# Patient Record
Sex: FEMALE | Race: WHITE | NOT HISPANIC OR LATINO | Employment: OTHER | URBAN - METROPOLITAN AREA
[De-identification: names, ages, dates, MRNs, and addresses within clinical notes are randomized per-mention and may not be internally consistent; named-entity substitution may affect disease eponyms.]

---

## 2023-09-03 PROBLEM — R41.3 MEMORY LOSS: Status: ACTIVE | Noted: 2023-09-03

## 2023-09-03 PROBLEM — H26.9 CATARACT: Status: ACTIVE | Noted: 2023-09-03

## 2023-09-03 PROBLEM — H25.12 AGE-RELATED NUCLEAR CATARACT OF LEFT EYE: Status: ACTIVE | Noted: 2023-09-03

## 2023-09-03 PROBLEM — E03.9 HYPOTHYROID: Status: ACTIVE | Noted: 2023-09-03

## 2023-09-03 PROBLEM — M19.90 OSTEOARTHRITIS: Status: ACTIVE | Noted: 2023-09-03

## 2023-09-03 PROBLEM — Z98.41 CATARACT EXTRACTION STATUS OF RIGHT EYE: Status: ACTIVE | Noted: 2023-09-03

## 2023-09-03 PROBLEM — F41.9 ANXIETY: Status: ACTIVE | Noted: 2023-09-03

## 2023-09-03 PROBLEM — R32 URINARY INCONTINENCE: Status: ACTIVE | Noted: 2023-09-03

## 2023-09-03 PROBLEM — E55.9 VITAMIN D DEFICIENCY: Status: ACTIVE | Noted: 2023-09-03

## 2023-09-03 PROBLEM — Z97.3 WEARS EYEGLASSES: Status: ACTIVE | Noted: 2023-09-03

## 2023-09-03 PROBLEM — I82.90 VENOUS THROMBOSIS: Status: ACTIVE | Noted: 2023-09-03

## 2023-09-03 PROBLEM — R10.32 LEFT LOWER QUADRANT PAIN: Status: ACTIVE | Noted: 2023-09-03

## 2023-09-03 PROBLEM — K21.9 GASTROESOPHAGEAL REFLUX DISEASE: Status: ACTIVE | Noted: 2023-09-03

## 2023-09-03 PROBLEM — I25.10 CORONARY ARTERY DISEASE: Status: ACTIVE | Noted: 2023-09-03

## 2023-09-03 PROBLEM — H04.129 DRY EYE: Status: ACTIVE | Noted: 2023-09-03

## 2023-09-03 PROBLEM — E78.5 HYPERLIPIDEMIA: Status: ACTIVE | Noted: 2023-09-03

## 2023-09-03 PROBLEM — N95.9 MENOPAUSAL AND POSTMENOPAUSAL DISORDER: Status: ACTIVE | Noted: 2023-09-03

## 2023-09-03 PROBLEM — H43.811 POSTERIOR VITREOUS DETACHMENT OF RIGHT EYE: Status: ACTIVE | Noted: 2023-09-03

## 2023-09-03 PROBLEM — H43.399 VITREOUS FLOATERS: Status: ACTIVE | Noted: 2023-09-03

## 2023-09-03 PROBLEM — I10 HYPERTENSION: Status: ACTIVE | Noted: 2023-09-03

## 2023-09-03 PROBLEM — H81.10 BENIGN PAROXYSMAL POSITIONAL VERTIGO: Status: ACTIVE | Noted: 2023-09-03

## 2023-09-03 RX ORDER — HYDROCODONE BITARTRATE AND ACETAMINOPHEN 5; 325 MG/1; MG/1
1-2 TABLET ORAL EVERY 4 HOURS PRN
COMMUNITY
End: 2023-12-18 | Stop reason: WASHOUT

## 2023-09-03 RX ORDER — NEOMYCIN/POLYMYXIN B/PRAMOXINE 3.5-10K-1
1 CREAM (GRAM) TOPICAL DAILY
COMMUNITY
End: 2023-12-18 | Stop reason: WASHOUT

## 2023-09-03 RX ORDER — GABAPENTIN 100 MG/1
CAPSULE ORAL
COMMUNITY
End: 2024-06-10 | Stop reason: SDUPTHER

## 2023-09-03 RX ORDER — ASCORBIC ACID 500 MG
500 TABLET ORAL DAILY
COMMUNITY
End: 2023-12-18 | Stop reason: WASHOUT

## 2023-09-03 RX ORDER — ROSUVASTATIN CALCIUM 40 MG/1
TABLET, COATED ORAL
COMMUNITY
End: 2024-03-28 | Stop reason: SDUPTHER

## 2023-09-03 RX ORDER — DONEPEZIL HYDROCHLORIDE 5 MG/1
TABLET, FILM COATED ORAL
COMMUNITY
End: 2023-12-18 | Stop reason: WASHOUT

## 2023-09-03 RX ORDER — ASCORBIC ACID 500 MG
500 TABLET ORAL 2 TIMES DAILY
COMMUNITY
End: 2023-12-18 | Stop reason: WASHOUT

## 2023-09-03 RX ORDER — NAPROXEN SODIUM 220 MG/1
81 TABLET, FILM COATED ORAL DAILY
COMMUNITY

## 2023-09-03 RX ORDER — SPIRONOLACTONE 25 MG/1
TABLET ORAL
COMMUNITY
End: 2023-12-18

## 2023-09-03 RX ORDER — PANTOPRAZOLE SODIUM 40 MG/1
40 TABLET, DELAYED RELEASE ORAL DAILY
COMMUNITY
End: 2024-03-19

## 2023-09-03 RX ORDER — AMLODIPINE BESYLATE 10 MG/1
10 TABLET ORAL DAILY
COMMUNITY
End: 2024-03-11

## 2023-09-03 RX ORDER — ATENOLOL 100 MG/1
1 TABLET ORAL 2 TIMES DAILY
COMMUNITY
End: 2024-06-10 | Stop reason: SDUPTHER

## 2023-09-03 RX ORDER — CHOLECALCIFEROL (VITAMIN D3) 50 MCG
2000 TABLET ORAL DAILY
COMMUNITY

## 2023-09-03 RX ORDER — CHOLECALCIFEROL (VITAMIN D3) 25 MCG
25 TABLET ORAL DAILY
COMMUNITY
End: 2023-12-18 | Stop reason: WASHOUT

## 2023-09-03 RX ORDER — PRAVASTATIN SODIUM 80 MG/1
80 TABLET ORAL DAILY
COMMUNITY
End: 2023-12-18 | Stop reason: WASHOUT

## 2023-09-03 RX ORDER — DONEPEZIL HYDROCHLORIDE 10 MG/1
1 TABLET, FILM COATED ORAL NIGHTLY
COMMUNITY
End: 2024-06-10 | Stop reason: SDUPTHER

## 2023-09-03 RX ORDER — LANOLIN ALCOHOL/MO/W.PET/CERES
1 CREAM (GRAM) TOPICAL DAILY
COMMUNITY

## 2023-09-03 RX ORDER — ASPIRIN 325 MG
325 TABLET ORAL 2 TIMES DAILY
COMMUNITY
End: 2023-12-18 | Stop reason: WASHOUT

## 2023-09-03 RX ORDER — LEVOTHYROXINE SODIUM 25 UG/1
25 TABLET ORAL
COMMUNITY
End: 2024-06-10 | Stop reason: SDUPTHER

## 2023-09-03 RX ORDER — INFLUENZA A VIRUS A/VICTORIA/2570/2019 IVR-215 (H1N1) ANTIGEN (FORMALDEHYDE INACTIVATED), INFLUENZA A VIRUS A/DARWIN/9/2021 SAN-010 (H3N2) ANTIGEN (FORMALDEHYDE INACTIVATED), INFLUENZA B VIRUS B/PHUKET/3073/2013 ANTIGEN (FORMALDEHYDE INACTIVATED), AND INFLUENZA B VIRUS B/MICHIGAN/01/2021 ANTIGEN (FORMALDEHYDE INACTIVATED) 60; 60; 60; 60 UG/.7ML; UG/.7ML; UG/.7ML; UG/.7ML
INJECTION, SUSPENSION INTRAMUSCULAR
COMMUNITY
End: 2024-04-15 | Stop reason: WASHOUT

## 2023-10-09 ENCOUNTER — TELEPHONE (OUTPATIENT)
Dept: PRIMARY CARE | Facility: CLINIC | Age: 88
End: 2023-10-09
Payer: MEDICARE

## 2023-10-09 DIAGNOSIS — E78.2 MIXED HYPERLIPIDEMIA: Primary | ICD-10-CM

## 2023-10-09 DIAGNOSIS — R53.82 CHRONIC FATIGUE: ICD-10-CM

## 2023-10-09 DIAGNOSIS — R30.0 DYSURIA: ICD-10-CM

## 2023-10-09 DIAGNOSIS — E03.9 ACQUIRED HYPOTHYROIDISM: ICD-10-CM

## 2023-10-09 DIAGNOSIS — E53.8 VITAMIN B12 DEFICIENCY: ICD-10-CM

## 2023-10-09 DIAGNOSIS — E55.9 VITAMIN D DEFICIENCY: ICD-10-CM

## 2023-10-09 NOTE — TELEPHONE ENCOUNTER
I am concerned she may have a bladder infection - I will place lab orders fo her thyroid and other labs and a urine order - recommend she get them checked asap

## 2023-10-09 NOTE — TELEPHONE ENCOUNTER
Patients niece called asking about patient thyroid medication. She stated there was a change in the thyroid. She is is feeling dizzy and has no appetite she would like to know if that could be because of the change in her thyroid levels. She can be reached at [phone]

## 2023-10-10 ENCOUNTER — LAB (OUTPATIENT)
Dept: LAB | Facility: LAB | Age: 88
End: 2023-10-10
Payer: MEDICARE

## 2023-10-10 DIAGNOSIS — E53.8 VITAMIN B12 DEFICIENCY: ICD-10-CM

## 2023-10-10 DIAGNOSIS — E78.2 MIXED HYPERLIPIDEMIA: ICD-10-CM

## 2023-10-10 DIAGNOSIS — E55.9 VITAMIN D DEFICIENCY: ICD-10-CM

## 2023-10-10 DIAGNOSIS — R53.82 CHRONIC FATIGUE: ICD-10-CM

## 2023-10-10 DIAGNOSIS — R30.0 DYSURIA: ICD-10-CM

## 2023-10-10 DIAGNOSIS — E03.9 ACQUIRED HYPOTHYROIDISM: ICD-10-CM

## 2023-10-10 LAB
ALBUMIN SERPL BCP-MCNC: 4.5 G/DL (ref 3.4–5)
ALP SERPL-CCNC: 66 U/L (ref 33–136)
ALT SERPL W P-5'-P-CCNC: 20 U/L (ref 7–45)
AMORPH CRY #/AREA UR COMP ASSIST: ABNORMAL /HPF
ANION GAP SERPL CALC-SCNC: 12 MMOL/L (ref 10–20)
APPEARANCE UR: ABNORMAL
AST SERPL W P-5'-P-CCNC: 21 U/L (ref 9–39)
BACTERIA #/AREA URNS AUTO: ABNORMAL /HPF
BASOPHILS # BLD AUTO: 0.02 X10*3/UL (ref 0–0.1)
BASOPHILS NFR BLD AUTO: 0.4 %
BILIRUB SERPL-MCNC: 1.2 MG/DL (ref 0–1.2)
BILIRUB UR STRIP.AUTO-MCNC: NEGATIVE MG/DL
BUN SERPL-MCNC: 18 MG/DL (ref 6–23)
CALCIUM SERPL-MCNC: 10.3 MG/DL (ref 8.6–10.3)
CHLORIDE SERPL-SCNC: 97 MMOL/L (ref 98–107)
CHOLEST SERPL-MCNC: 125 MG/DL (ref 0–199)
CHOLESTEROL/HDL RATIO: 2.6
CO2 SERPL-SCNC: 29 MMOL/L (ref 21–32)
COLOR UR: YELLOW
CREAT SERPL-MCNC: 1.03 MG/DL (ref 0.5–1.05)
EOSINOPHIL # BLD AUTO: 0.05 X10*3/UL (ref 0–0.4)
EOSINOPHIL NFR BLD AUTO: 1 %
ERYTHROCYTE [DISTWIDTH] IN BLOOD BY AUTOMATED COUNT: 12 % (ref 11.5–14.5)
GFR SERPL CREATININE-BSD FRML MDRD: 52 ML/MIN/1.73M*2
GLUCOSE SERPL-MCNC: 96 MG/DL (ref 74–99)
GLUCOSE UR STRIP.AUTO-MCNC: NEGATIVE MG/DL
GRAN CASTS #/AREA UR COMP ASSIST: ABNORMAL /LPF
HCT VFR BLD AUTO: 41.6 % (ref 36–46)
HDLC SERPL-MCNC: 47.8 MG/DL
HGB BLD-MCNC: 14.1 G/DL (ref 12–16)
HOLD SPECIMEN: NORMAL
HYALINE CASTS #/AREA URNS AUTO: ABNORMAL /LPF
IMM GRANULOCYTES # BLD AUTO: 0.01 X10*3/UL (ref 0–0.5)
IMM GRANULOCYTES NFR BLD AUTO: 0.2 % (ref 0–0.9)
KETONES UR STRIP.AUTO-MCNC: NEGATIVE MG/DL
LDLC SERPL CALC-MCNC: 53 MG/DL (ref 140–190)
LEUKOCYTE ESTERASE UR QL STRIP.AUTO: NEGATIVE
LYMPHOCYTES # BLD AUTO: 1.83 X10*3/UL (ref 0.8–3)
LYMPHOCYTES NFR BLD AUTO: 36.2 %
MCH RBC QN AUTO: 32 PG (ref 26–34)
MCHC RBC AUTO-ENTMCNC: 33.9 G/DL (ref 32–36)
MCV RBC AUTO: 94 FL (ref 80–100)
MONOCYTES # BLD AUTO: 0.49 X10*3/UL (ref 0.05–0.8)
MONOCYTES NFR BLD AUTO: 9.7 %
MUCOUS THREADS #/AREA URNS AUTO: ABNORMAL /LPF
NEUTROPHILS # BLD AUTO: 2.66 X10*3/UL (ref 1.6–5.5)
NEUTROPHILS NFR BLD AUTO: 52.5 %
NITRITE UR QL STRIP.AUTO: NEGATIVE
NON HDL CHOLESTEROL: 77 MG/DL (ref 0–149)
NRBC BLD-RTO: 0 /100 WBCS (ref 0–0)
PH UR STRIP.AUTO: 5 [PH]
PLATELET # BLD AUTO: 243 X10*3/UL (ref 150–450)
PMV BLD AUTO: 10.7 FL (ref 7.5–11.5)
POTASSIUM SERPL-SCNC: 4.1 MMOL/L (ref 3.5–5.3)
PROT SERPL-MCNC: 7.1 G/DL (ref 6.4–8.2)
PROT UR STRIP.AUTO-MCNC: ABNORMAL MG/DL
RBC # BLD AUTO: 4.41 X10*6/UL (ref 4–5.2)
RBC # UR STRIP.AUTO: ABNORMAL /UL
RBC #/AREA URNS AUTO: ABNORMAL /HPF
SODIUM SERPL-SCNC: 134 MMOL/L (ref 136–145)
SP GR UR STRIP.AUTO: 1.01
SQUAMOUS #/AREA URNS AUTO: ABNORMAL /HPF
T4 FREE SERPL-MCNC: 1.17 NG/DL (ref 0.61–1.12)
TRIGL SERPL-MCNC: 122 MG/DL (ref 0–149)
TSH SERPL-ACNC: 3.52 MIU/L (ref 0.44–3.98)
UROBILINOGEN UR STRIP.AUTO-MCNC: <2 MG/DL
VLDL: 24 MG/DL (ref 0–40)
WBC # BLD AUTO: 5.1 X10*3/UL (ref 4.4–11.3)
WBC #/AREA URNS AUTO: ABNORMAL /HPF

## 2023-10-10 PROCEDURE — 82306 VITAMIN D 25 HYDROXY: CPT

## 2023-10-10 PROCEDURE — 82607 VITAMIN B-12: CPT

## 2023-10-10 PROCEDURE — 36415 COLL VENOUS BLD VENIPUNCTURE: CPT

## 2023-10-10 PROCEDURE — 87086 URINE CULTURE/COLONY COUNT: CPT

## 2023-10-11 LAB
25(OH)D3 SERPL-MCNC: 62 NG/ML (ref 30–100)
BACTERIA UR CULT: NORMAL
VIT B12 SERPL-MCNC: 1053 PG/ML (ref 211–911)

## 2023-10-12 ENCOUNTER — TELEPHONE (OUTPATIENT)
Dept: PRIMARY CARE | Facility: CLINIC | Age: 88
End: 2023-10-12
Payer: MEDICARE

## 2023-12-18 ENCOUNTER — OFFICE VISIT (OUTPATIENT)
Dept: PRIMARY CARE | Facility: CLINIC | Age: 88
End: 2023-12-18
Payer: MEDICARE

## 2023-12-18 VITALS — WEIGHT: 85.3 LBS | SYSTOLIC BLOOD PRESSURE: 120 MMHG | BODY MASS INDEX: 16.66 KG/M2 | DIASTOLIC BLOOD PRESSURE: 78 MMHG

## 2023-12-18 DIAGNOSIS — R41.3 MEMORY LOSS: ICD-10-CM

## 2023-12-18 DIAGNOSIS — Z23 ENCOUNTER FOR IMMUNIZATION: ICD-10-CM

## 2023-12-18 DIAGNOSIS — I10 PRIMARY HYPERTENSION: Primary | ICD-10-CM

## 2023-12-18 DIAGNOSIS — F41.9 ANXIETY: ICD-10-CM

## 2023-12-18 DIAGNOSIS — R53.82 CHRONIC FATIGUE: ICD-10-CM

## 2023-12-18 DIAGNOSIS — R79.0 LOW MAGNESIUM LEVEL: ICD-10-CM

## 2023-12-18 DIAGNOSIS — R63.4 WEIGHT LOSS: ICD-10-CM

## 2023-12-18 DIAGNOSIS — Z00.00 MEDICARE ANNUAL WELLNESS VISIT, SUBSEQUENT: Primary | ICD-10-CM

## 2023-12-18 DIAGNOSIS — I10 PRIMARY HYPERTENSION: ICD-10-CM

## 2023-12-18 DIAGNOSIS — E78.2 MIXED HYPERLIPIDEMIA: ICD-10-CM

## 2023-12-18 DIAGNOSIS — M54.50 LOW BACK PAIN WITHOUT SCIATICA, UNSPECIFIED BACK PAIN LATERALITY, UNSPECIFIED CHRONICITY: ICD-10-CM

## 2023-12-18 PROCEDURE — 3074F SYST BP LT 130 MM HG: CPT | Performed by: FAMILY MEDICINE

## 2023-12-18 PROCEDURE — 99214 OFFICE O/P EST MOD 30 MIN: CPT | Performed by: FAMILY MEDICINE

## 2023-12-18 PROCEDURE — G0439 PPPS, SUBSEQ VISIT: HCPCS | Performed by: FAMILY MEDICINE

## 2023-12-18 PROCEDURE — 3078F DIAST BP <80 MM HG: CPT | Performed by: FAMILY MEDICINE

## 2023-12-18 PROCEDURE — 90677 PCV20 VACCINE IM: CPT | Performed by: FAMILY MEDICINE

## 2023-12-18 PROCEDURE — 1125F AMNT PAIN NOTED PAIN PRSNT: CPT | Performed by: FAMILY MEDICINE

## 2023-12-18 PROCEDURE — 1160F RVW MEDS BY RX/DR IN RCRD: CPT | Performed by: FAMILY MEDICINE

## 2023-12-18 PROCEDURE — 1170F FXNL STATUS ASSESSED: CPT | Performed by: FAMILY MEDICINE

## 2023-12-18 PROCEDURE — 1159F MED LIST DOCD IN RCRD: CPT | Performed by: FAMILY MEDICINE

## 2023-12-18 PROCEDURE — 90471 IMMUNIZATION ADMIN: CPT | Performed by: FAMILY MEDICINE

## 2023-12-18 PROCEDURE — 1036F TOBACCO NON-USER: CPT | Performed by: FAMILY MEDICINE

## 2023-12-18 PROCEDURE — 99215 OFFICE O/P EST HI 40 MIN: CPT | Performed by: FAMILY MEDICINE

## 2023-12-18 RX ORDER — ESCITALOPRAM OXALATE 5 MG/1
TABLET ORAL
Qty: 30 TABLET | Refills: 3 | Status: SHIPPED | OUTPATIENT
Start: 2023-12-18 | End: 2024-01-15 | Stop reason: SDUPTHER

## 2023-12-18 RX ORDER — SPIRONOLACTONE 25 MG/1
TABLET ORAL
Qty: 90 TABLET | Refills: 3 | Status: SHIPPED | OUTPATIENT
Start: 2023-12-18 | End: 2024-04-15 | Stop reason: WASHOUT

## 2023-12-18 ASSESSMENT — ACTIVITIES OF DAILY LIVING (ADL)
USING TRANSPORTATION: TOTAL CARE
PREPARING MEALS: INDEPENDENT
TAKING MEDICATION: NEEDS ASSISTANCE
STIL DRIVING: NO
NEEDS ASSISTANCE WITH FOOD: INDEPENDENT
EATING: INDEPENDENT
FEEDING: INDEPENDENT
GROCERY SHOPPING: INDEPENDENT
DOING HOUSEWORK: INDEPENDENT
USING TELEPHONE: INDEPENDENT
DRESSING: INDEPENDENT
BATHING: INDEPENDENT
PILL BOX USED: YES
ADEQUATE_TO_COMPLETE_ADL: YES
TOILETING: INDEPENDENT
MANAGING FINANCES: TOTAL CARE
JUDGMENT_ADEQUATE_SAFELY_COMPLETE_DAILY_ACTIVITIES: YES

## 2023-12-18 ASSESSMENT — PATIENT HEALTH QUESTIONNAIRE - PHQ9
1. LITTLE INTEREST OR PLEASURE IN DOING THINGS: NOT AT ALL
SUM OF ALL RESPONSES TO PHQ9 QUESTIONS 1 AND 2: 0
2. FEELING DOWN, DEPRESSED OR HOPELESS: NOT AT ALL

## 2023-12-18 ASSESSMENT — ENCOUNTER SYMPTOMS
FREQUENCY: 0
COUGH: 0
NAUSEA: 0
DEPRESSION: 0
WHEEZING: 0
CHILLS: 0
PALPITATIONS: 0
SHORTNESS OF BREATH: 0
DIZZINESS: 0
OCCASIONAL FEELINGS OF UNSTEADINESS: 0
ARTHRALGIAS: 0
WEAKNESS: 1
TREMORS: 0
MYALGIAS: 0
CONSTIPATION: 0
HEMATURIA: 0
LOSS OF SENSATION IN FEET: 0
FEVER: 0
FATIGUE: 1
VOMITING: 0

## 2023-12-18 ASSESSMENT — GERIATRIC MINI NUTRITIONAL ASSESSMENT (MNA)
D HAS SUFFERED PSYCHOLOGICAL STRESS OR ACUTE DISEASE IN THE PAST 3 MONTHS?: NO
E NEUROPSYCHOLOGICAL PROBLEMS: NO PSYCHOLOGICAL PROBLEMS
C GENERAL MOBILITY: GOES OUT
B WEIGHT LOSS DURING THE LAST 3 MONTHS: WEIGHT LOSS BETWEEN 1 AND 3 KG (2.2 AND 6.6 LBS)
A HAS FOOD INTAKE DECLINED OVER THE PAST 3 MONTHS DUE TO LOSS OF APPETITE, DIGESTIVE PROBLEMS, CHEWING OR SWALLOWING DIFFICULTIES?: NO DECREASE IN FOOD INTAKE

## 2023-12-18 ASSESSMENT — PAIN SCALES - GENERAL: PAINLEVEL: 8

## 2023-12-18 ASSESSMENT — COGNITIVE AND FUNCTIONAL STATUS - GENERAL: VERBAL FLUENCY - ANIMAL NAMES (0 TO 25): 0

## 2023-12-18 NOTE — PROGRESS NOTES
Subjective   Patient ID: Mariaelena Tavarez is a 88 y.o. female who presents for Annual Exam.     Patient here for Medicare Wellness Exam.         Active medical problems:         PMH/PSH/FMH/SHX: reviewed, noted below.         Other providers:         Medications: reviewed, noted below.         Depression screening: denies feeling down, depressed, hopeless. Denies having felt little interest or pleasure in doing things.         Functional ability and safety: Get up and go test is <30s. Pt has some issues with ADLs, including phone, she does not drive or shop, she is able to prepare meals, housework,laundry, medications or managing money. No home safety concerns, including having rugs in the hallway, lack grab bars in the bathroom, lack handrails, on the stairs or have poor lighting. Pt denies falls. She does have concerns with her balance and agrees to home PT         Cognitive evaluation: MMSE         Hearing changes: Pt denies changes or concerns.         Advanced directives: discussed and recommended.         Preventative services: sheet reviewed, scanned, copy provided to patient.  Mini Cognitive Screening:          Three Word Recall             Words:  Banana, Sunrise, Chair .            Patient able to repeat?  Yes .         Three Word Recall after 5mins             Word recall Score (0-3):  2 .         Clock Drawing             Given preprinted [place] and instructions?  Yes .            Clock Draw Score (0 or 2):  1 .         Clock Draw plus Word Recall Score             Mini Cog Result <3 Pos:  3 .         Follow-up: She does not want to see neurologist  She agrees to Home PT to help with her back pain, posture and balance concerns            .   .     Anxiety:          Anxiety F/U worsening - the fluoxetine does not help and made her sleepy - she switched to gabapentin at night - she sleeps better and she feels less anxious. . She is having a slight increase in her anxiety - wants to add a low dose med to help          Medications fluoxitine did not help, gabapentin is helpful.          Stressors stable.          Supports significant, support from family, support from friends.          Mood stable, at patient's baseline.          Sleep disturbance none.          Energy change normal energy.          Concentration improving.          Suicidal ideations none.      Hypertension:          Patient here for F/U. at goal.          Med compliance yes.          Med side effects none.      Memory Loss:          Memory loss she has short term memory loss - slight improvement on generic aricept, she stopped her namenda and does not want to restart it.     Hypothyroidism:          Follow Up taking hormone supplement routinely, due for a tsh.          Hypothyroidism tolerating meds with no side effects.      GERD:          GERD F/U occasional symptoms -she did well on pantoprazole in the past, having break through symptoms.          medicine failed on pepcid alone.          she has irritated taste buds from her gerd.     Hyperlipidemia:          Patient here for F/U. tolerating medicine well, stable.          Labs ordered today.      Medications reviewed:          Current medications Use reviewed and recorded.          Vitamin/Mineral supplements Use reviewed and recorded.      Review family history:          Reviewed See family history.      Review past history:          Reviewed See past history.      Review surgical history:          Reviewed See surgical history. Hypertension:          Patient here for F/U. at goal.          Med compliance yes.          Med side effects none.      Memory Loss:          Memory loss she has short term memory loss - slight improvement on generic aricept, she is willing to start namenda.      Hypothyroidism:          Follow Up taking hormone supplement routinely, due for a tsh.          Hypothyroidism tolerating meds with no side effects.      GERD:          GERD F/U occasional symptoms -she did well on  pantoprazole in the past, having break through symptoms.          medicine failed on pepcid alone.          she has irritated taste buds from her gerd.     Hyperlipidemia:          Patient here for F/U. tolerating medicine well, stable.          Labs ordered today.          Review of Systems   Constitutional:  Positive for fatigue. Negative for chills and fever.   HENT:  Negative for ear pain, postnasal drip and tinnitus.    Respiratory:  Negative for cough, shortness of breath and wheezing.    Cardiovascular:  Negative for chest pain, palpitations and leg swelling.   Gastrointestinal:  Negative for constipation, nausea and vomiting.   Endocrine: Negative for cold intolerance.   Genitourinary:  Negative for frequency and hematuria.   Musculoskeletal:  Negative for arthralgias and myalgias.   Neurological:  Positive for weakness. Negative for dizziness and tremors.       Objective   /78   Wt (!) 38.7 kg (85 lb 4.8 oz)   BMI 16.66 kg/m²     Physical Exam  Vitals reviewed. Exam conducted with a chaperone present.   Constitutional:       Appearance: Normal appearance.   Cardiovascular:      Rate and Rhythm: Normal rate and regular rhythm.      Heart sounds: Normal heart sounds. No murmur heard.  Pulmonary:      Breath sounds: Normal breath sounds.   Chest:   Breasts:     Right: Normal. No mass, nipple discharge, skin change or tenderness.      Left: Normal. No mass, nipple discharge, skin change or tenderness.   Abdominal:      General: Abdomen is flat. Bowel sounds are normal.      Palpations: Abdomen is soft.   Musculoskeletal:         General: No swelling.   Neurological:      General: No focal deficit present.      Mental Status: She is alert and oriented to person, place, and time.   Psychiatric:         Mood and Affect: Mood normal.         Behavior: Behavior normal.         Assessment/Plan   Problem List Items Addressed This Visit             ICD-10-CM       Cardiac and Vasculature    Hyperlipidemia E78.5     Hypertension I10       Mental Health    Anxiety F41.9    Relevant Medications    escitalopram (Lexapro) 5 mg tablet       Neuro    Memory loss R41.3     Other Visit Diagnoses         Codes    Medicare annual wellness visit, subsequent    -  Primary Z00.00    Low back pain without sciatica, unspecified back pain laterality, unspecified chronicity     M54.50    Chronic fatigue     R53.82    Weight loss     R63.4    Encounter for immunization     Z23    Low magnesium level     R79.0          Will consult home PT for balance and osteoarthritis - back pain

## 2023-12-18 NOTE — PATIENT INSTRUCTIONS
Please look at Assisted Living places    Have the conversation with Gabbie that you are unable to live together but you can help her with her finances (while still encouraging her to get a job)    Lab orders placed to get done in [place] for you to have done next week.    Home PT will be calling for you to set up something at home.    Get  a XS or small back brace    Prevnar 20 was given today    Will send lexapro to your pharmacy

## 2024-01-01 ENCOUNTER — APPOINTMENT (OUTPATIENT)
Dept: RADIOLOGY | Facility: HOSPITAL | Age: 89
End: 2024-01-01
Payer: MEDICARE

## 2024-01-01 ENCOUNTER — APPOINTMENT (OUTPATIENT)
Dept: CARDIOLOGY | Facility: HOSPITAL | Age: 89
End: 2024-01-01
Payer: MEDICARE

## 2024-01-01 ENCOUNTER — HOSPITAL ENCOUNTER (EMERGENCY)
Facility: HOSPITAL | Age: 89
Discharge: HOME | End: 2024-01-01
Attending: EMERGENCY MEDICINE
Payer: MEDICARE

## 2024-01-01 VITALS
RESPIRATION RATE: 15 BRPM | BODY MASS INDEX: 17.08 KG/M2 | TEMPERATURE: 98.1 F | HEART RATE: 57 BPM | OXYGEN SATURATION: 96 % | SYSTOLIC BLOOD PRESSURE: 138 MMHG | DIASTOLIC BLOOD PRESSURE: 57 MMHG | WEIGHT: 87 LBS | HEIGHT: 60 IN

## 2024-01-01 DIAGNOSIS — R53.1 GENERALIZED WEAKNESS: Primary | ICD-10-CM

## 2024-01-01 DIAGNOSIS — E86.0 DEHYDRATION: ICD-10-CM

## 2024-01-01 LAB
ALBUMIN SERPL-MCNC: 4.5 G/DL (ref 3.5–5)
ALP BLD-CCNC: 77 U/L (ref 35–125)
ALT SERPL-CCNC: 14 U/L (ref 5–40)
ANION GAP SERPL CALC-SCNC: 10 MMOL/L
APPEARANCE UR: CLEAR
AST SERPL-CCNC: 15 U/L (ref 5–40)
BASOPHILS # BLD AUTO: 0.03 X10*3/UL (ref 0–0.1)
BASOPHILS NFR BLD AUTO: 0.6 %
BILIRUB SERPL-MCNC: 0.8 MG/DL (ref 0.1–1.2)
BILIRUB UR STRIP.AUTO-MCNC: NEGATIVE MG/DL
BUN SERPL-MCNC: 20 MG/DL (ref 8–25)
CALCIUM SERPL-MCNC: 9.9 MG/DL (ref 8.5–10.4)
CHLORIDE SERPL-SCNC: 99 MMOL/L (ref 97–107)
CO2 SERPL-SCNC: 27 MMOL/L (ref 24–31)
COLOR UR: ABNORMAL
CREAT SERPL-MCNC: 0.7 MG/DL (ref 0.4–1.6)
EOSINOPHIL # BLD AUTO: 0.06 X10*3/UL (ref 0–0.4)
EOSINOPHIL NFR BLD AUTO: 1.1 %
ERYTHROCYTE [DISTWIDTH] IN BLOOD BY AUTOMATED COUNT: 11.9 % (ref 11.5–14.5)
FLUAV RNA RESP QL NAA+PROBE: NOT DETECTED
FLUBV RNA RESP QL NAA+PROBE: NOT DETECTED
GFR SERPL CREATININE-BSD FRML MDRD: 83 ML/MIN/1.73M*2
GLUCOSE SERPL-MCNC: 153 MG/DL (ref 65–99)
GLUCOSE UR STRIP.AUTO-MCNC: NORMAL MG/DL
HCT VFR BLD AUTO: 41.4 % (ref 36–46)
HGB BLD-MCNC: 13.9 G/DL (ref 12–16)
IMM GRANULOCYTES # BLD AUTO: 0.01 X10*3/UL (ref 0–0.5)
IMM GRANULOCYTES NFR BLD AUTO: 0.2 % (ref 0–0.9)
KETONES UR STRIP.AUTO-MCNC: NEGATIVE MG/DL
LEUKOCYTE ESTERASE UR QL STRIP.AUTO: NEGATIVE
LYMPHOCYTES # BLD AUTO: 1.64 X10*3/UL (ref 0.8–3)
LYMPHOCYTES NFR BLD AUTO: 31 %
MCH RBC QN AUTO: 31.8 PG (ref 26–34)
MCHC RBC AUTO-ENTMCNC: 33.6 G/DL (ref 32–36)
MCV RBC AUTO: 95 FL (ref 80–100)
MONOCYTES # BLD AUTO: 0.52 X10*3/UL (ref 0.05–0.8)
MONOCYTES NFR BLD AUTO: 9.8 %
MUCOUS THREADS #/AREA URNS AUTO: NORMAL /LPF
NEUTROPHILS # BLD AUTO: 3.03 X10*3/UL (ref 1.6–5.5)
NEUTROPHILS NFR BLD AUTO: 57.3 %
NITRITE UR QL STRIP.AUTO: NEGATIVE
NRBC BLD-RTO: 0 /100 WBCS (ref 0–0)
PH UR STRIP.AUTO: 6 [PH]
PLATELET # BLD AUTO: 207 X10*3/UL (ref 150–450)
POTASSIUM SERPL-SCNC: 3.7 MMOL/L (ref 3.4–5.1)
PROT SERPL-MCNC: 7.2 G/DL (ref 5.9–7.9)
PROT UR STRIP.AUTO-MCNC: ABNORMAL MG/DL
RBC # BLD AUTO: 4.37 X10*6/UL (ref 4–5.2)
RBC # UR STRIP.AUTO: ABNORMAL /UL
RBC #/AREA URNS AUTO: NORMAL /HPF
SARS-COV-2 RNA RESP QL NAA+PROBE: NOT DETECTED
SODIUM SERPL-SCNC: 136 MMOL/L (ref 133–145)
SP GR UR STRIP.AUTO: 1.02
UROBILINOGEN UR STRIP.AUTO-MCNC: NORMAL MG/DL
WBC # BLD AUTO: 5.3 X10*3/UL (ref 4.4–11.3)
WBC #/AREA URNS AUTO: NORMAL /HPF

## 2024-01-01 PROCEDURE — 80053 COMPREHEN METABOLIC PANEL: CPT | Performed by: EMERGENCY MEDICINE

## 2024-01-01 PROCEDURE — 93005 ELECTROCARDIOGRAM TRACING: CPT

## 2024-01-01 PROCEDURE — 96360 HYDRATION IV INFUSION INIT: CPT

## 2024-01-01 PROCEDURE — 70450 CT HEAD/BRAIN W/O DYE: CPT

## 2024-01-01 PROCEDURE — 87636 SARSCOV2 & INF A&B AMP PRB: CPT | Performed by: EMERGENCY MEDICINE

## 2024-01-01 PROCEDURE — 85025 COMPLETE CBC W/AUTO DIFF WBC: CPT | Performed by: EMERGENCY MEDICINE

## 2024-01-01 PROCEDURE — 36415 COLL VENOUS BLD VENIPUNCTURE: CPT | Performed by: EMERGENCY MEDICINE

## 2024-01-01 PROCEDURE — 71046 X-RAY EXAM CHEST 2 VIEWS: CPT

## 2024-01-01 PROCEDURE — 81001 URINALYSIS AUTO W/SCOPE: CPT | Performed by: EMERGENCY MEDICINE

## 2024-01-01 PROCEDURE — 99284 EMERGENCY DEPT VISIT MOD MDM: CPT | Performed by: EMERGENCY MEDICINE

## 2024-01-01 PROCEDURE — 2500000004 HC RX 250 GENERAL PHARMACY W/ HCPCS (ALT 636 FOR OP/ED): Performed by: EMERGENCY MEDICINE

## 2024-01-01 PROCEDURE — 99285 EMERGENCY DEPT VISIT HI MDM: CPT | Mod: 25

## 2024-01-01 RX ADMIN — SODIUM CHLORIDE 500 ML: 900 INJECTION, SOLUTION INTRAVENOUS at 17:04

## 2024-01-01 ASSESSMENT — COLUMBIA-SUICIDE SEVERITY RATING SCALE - C-SSRS
1. IN THE PAST MONTH, HAVE YOU WISHED YOU WERE DEAD OR WISHED YOU COULD GO TO SLEEP AND NOT WAKE UP?: NO
2. HAVE YOU ACTUALLY HAD ANY THOUGHTS OF KILLING YOURSELF?: NO
6. HAVE YOU EVER DONE ANYTHING, STARTED TO DO ANYTHING, OR PREPARED TO DO ANYTHING TO END YOUR LIFE?: NO

## 2024-01-01 ASSESSMENT — PAIN - FUNCTIONAL ASSESSMENT: PAIN_FUNCTIONAL_ASSESSMENT: 0-10

## 2024-01-01 ASSESSMENT — PAIN SCALES - GENERAL: PAINLEVEL_OUTOF10: 0 - NO PAIN

## 2024-01-02 NOTE — ED PROVIDER NOTES
HPI   Chief Complaint   Patient presents with    Weakness, Gen     Patient bib ems for generalized weakness for the past few weeks with occasional slurred speech. Patient was ambulatory without any assistive device for ems and did not have slurred speech. Patient is a&ox4 and nad. VSS        88-year-old female presents for chief complaint of generalized malaise and weakness over the past 2 weeks.  She feels like her speech has occasionally been abnormal but she has had a bit of a dry mouth.  No focal weakness.  No headaches.  No specific ill symptoms.  Daughter states that she did not sleep at all last night and does not drink much fluid is prone to dehydration she is concerned for same.                          [place] Coma Scale Score: 15         NIH Stroke Scale: 0          Patient History   Past Medical History:   Diagnosis Date    Unspecified cataract     Cataract of right eye, unspecified cataract type     Past Surgical History:   Procedure Laterality Date    CATARACT EXTRACTION  09/04/2018    Cataract Surgery    CT GUIDED IMAGING FOR NEEDLE PLACEMENT  9/27/2016    CT GUIDED IMAGING FOR NEEDLE PLACEMENT [facility]     Family History   Problem Relation Name Age of Onset    Hypertension Mother      Stroke Mother      Arthritis Father      Parkinsonism Sister      Dementia Sister      Cancer Maternal Grandfather      Heart disease Other [facility]     Hypertension Other [facility]     Stroke Other [facility]     Hypertension Sibling      Stroke Sibling       Social History     Tobacco Use    Smoking status: Never    Smokeless tobacco: Never   Substance Use Topics    Alcohol use: Never    Drug use: Never       Physical Exam   ED Triage Vitals   Temp Heart Rate Resp BP   01/01/24 1501 01/01/24 1501 01/01/24 1501 01/01/24 1501   36.7 °C (98.1 °F) 59 15 148/62      SpO2 Temp Source Heart Rate Source Patient Position   01/01/24 1501 01/01/24 1501 01/01/24 1600 01/01/24 1600   96 % Oral Monitor Sitting      BP Location FiO2 (%)      01/01/24 1600 --     Right arm        Physical Exam  Vitals and nursing note reviewed.     General: Vitals reviewed. Awake, alert, well-developed, well-nourished, NAD  HEENT: NC/AT, PERRL, MM tacky  Neck: Supple, trachea midline  Respiratory: No respiratory distress, lungs clear to auscultation bilaterally, no wheezes, rhonchi, or rales  CV: Regular rate and regular rhythm, no murmur/gallop/rubs  Abdomen/GI: Soft, non-tender, non-distended, no rebound, guarding, or rigidity, normal bowel sounds  Extremities: Moving all extremities, no deformities  Neuro: AAOx3, cranial nerves intact, strength 5/5 x 4 extremities, sensation diffusely intact, no ataxia on extremeties, no truncal ataxia, normal test of skew, NIH 0  Skin: Warm, dry. No rashes identified    ED Course & MDM   ED Course as of 01/01/24 1911 Mon Jan 01, 2024   1501 EKG on my independent review: Sinus bradycardia 52 bpm, normal axis, normal intervals, T wave version in aVL no other acute ST or T wave abnormalities [GUS]      ED Course User Index  [GUS] Priyanka Clancy MD         Diagnoses as of 01/01/24 1911   Generalized weakness   Dehydration       Medical Decision Making  88-year-old female presents for generalized malaise and weakness.  NIH 0 low suspicion for acute CVA.  She did not sleep well last night which may be contributory also poor p.o. and fluid intake.  Daughter states she missed her morning medications yesterday for reasons that are somewhat unclear.  Consider underlying infectious, metabolic abnormalities among other.  Patient given IV fluids.  EKG without arrhythmia.  Labs without significant abnormality.  Urinalysis negative for UTI.  Chest x-ray on my independent review with no acute cardiopulmonary process.  Influenza/COVID-19 negative.  CT brain without acute process.  Patient feeling better after IV fluids and able to ambulate with a steady gait independently.  Etiology of her symptoms remains unclear.  Discussed could be mild  viral etiology but at this time no indication for need for hospitalization and can continue symptomatic management at home.  Return and follow-up instructions discussed.    Amount and/or Complexity of Data Reviewed  Labs: ordered. Decision-making details documented in ED Course.  Radiology: ordered and independent interpretation performed. Decision-making details documented in ED Course.  ECG/medicine tests: ordered and independent interpretation performed. Decision-making details documented in ED Course.        Procedure  Procedures     Priyanka Clancy MD  01/01/24 1927

## 2024-01-02 NOTE — DISCHARGE INSTRUCTIONS
The cause of your generalized weakness is unclear at this time may be component of poor sleep and poor hydration.  Drink plenty of fluids and get plenty of rest.  Make an appoint to follow-up with your primary care doctor.  Return immediately if you develop weakness on one side or other symptoms that concern you.

## 2024-01-03 LAB
ATRIAL RATE: 52 BPM
P AXIS: 71 DEGREES
P OFFSET: 167 MS
P ONSET: 125 MS
PR INTERVAL: 188 MS
Q ONSET: 219 MS
QRS COUNT: 8 BEATS
QRS DURATION: 80 MS
QT INTERVAL: 452 MS
QTC CALCULATION(BAZETT): 420 MS
QTC FREDERICIA: 430 MS
R AXIS: 35 DEGREES
T AXIS: 71 DEGREES
T OFFSET: 445 MS
VENTRICULAR RATE: 52 BPM

## 2024-01-15 ENCOUNTER — TELEMEDICINE (OUTPATIENT)
Dept: PRIMARY CARE | Facility: CLINIC | Age: 89
End: 2024-01-15
Payer: MEDICARE

## 2024-01-15 VITALS — BODY MASS INDEX: 17.08 KG/M2 | WEIGHT: 87 LBS | HEIGHT: 60 IN

## 2024-01-15 DIAGNOSIS — F41.9 ANXIETY: ICD-10-CM

## 2024-01-15 PROCEDURE — 99442 PR PHYS/QHP TELEPHONE EVALUATION 11-20 MIN: CPT | Performed by: FAMILY MEDICINE

## 2024-01-15 RX ORDER — ESCITALOPRAM OXALATE 5 MG/1
TABLET ORAL
Qty: 90 TABLET | Refills: 1 | Status: SHIPPED | OUTPATIENT
Start: 2024-01-15 | End: 2024-06-10 | Stop reason: SDUPTHER

## 2024-01-15 ASSESSMENT — PAIN SCALES - GENERAL: PAINLEVEL: 0-NO PAIN

## 2024-01-15 ASSESSMENT — PATIENT HEALTH QUESTIONNAIRE - PHQ9
2. FEELING DOWN, DEPRESSED OR HOPELESS: NOT AT ALL
SUM OF ALL RESPONSES TO PHQ9 QUESTIONS 1 AND 2: 0
1. LITTLE INTEREST OR PLEASURE IN DOING THINGS: NOT AT ALL

## 2024-01-15 ASSESSMENT — ENCOUNTER SYMPTOMS
TREMORS: 0
DEPRESSION: 0
WHEEZING: 0
CONSTIPATION: 0
DIZZINESS: 0
FEVER: 0
OCCASIONAL FEELINGS OF UNSTEADINESS: 0
CHILLS: 0
WEAKNESS: 0
PALPITATIONS: 0
LOSS OF SENSATION IN FEET: 0
COUGH: 0
NAUSEA: 0
SHORTNESS OF BREATH: 0
FATIGUE: 1
VOMITING: 0

## 2024-01-15 NOTE — PROGRESS NOTES
Subjective   Patient ID: Mariaelena Tavarez is a 88 y.o. female who presents for No chief complaint on file..  This visit was completed via telephone/virtual visit. All issues as documented below were discussed and addressed but no physical exam was performed. If it was felt that the patient should be evaluated via  face-to-face office appointment(s) they were directed there. The patient verbally consented to this visit.   She started on low dose lexapro - she is feeling better, wants to stay on the low dose - she denies any side effects         Review of Systems   Constitutional:  Positive for fatigue. Negative for chills and fever.   Respiratory:  Negative for cough, shortness of breath and wheezing.    Cardiovascular:  Negative for chest pain, palpitations and leg swelling.   Gastrointestinal:  Negative for constipation, nausea and vomiting.   Endocrine: Negative for cold intolerance.   Neurological:  Negative for dizziness, tremors and weakness.       Objective   Ht 1.524 m (5')   Wt (!) 39.5 kg (87 lb)   BMI 16.99 kg/m²     Physical Exam  14 min discussion and chart review  Assessment/Plan   Problem List Items Addressed This Visit             ICD-10-CM    Anxiety F41.9    Relevant Medications    escitalopram (Lexapro) 5 mg tablet

## 2024-03-11 DIAGNOSIS — I10 PRIMARY HYPERTENSION: Primary | ICD-10-CM

## 2024-03-11 RX ORDER — AMLODIPINE BESYLATE 10 MG/1
10 TABLET ORAL DAILY
Qty: 90 TABLET | Refills: 1 | Status: SHIPPED | OUTPATIENT
Start: 2024-03-11 | End: 2024-06-10 | Stop reason: SDUPTHER

## 2024-03-19 DIAGNOSIS — K21.9 GASTROESOPHAGEAL REFLUX DISEASE WITHOUT ESOPHAGITIS: Primary | ICD-10-CM

## 2024-03-19 RX ORDER — PANTOPRAZOLE SODIUM 40 MG/1
40 TABLET, DELAYED RELEASE ORAL DAILY
Qty: 90 TABLET | Refills: 0 | Status: SHIPPED | OUTPATIENT
Start: 2024-03-19 | End: 2024-03-22

## 2024-03-22 DIAGNOSIS — K21.9 GASTROESOPHAGEAL REFLUX DISEASE WITHOUT ESOPHAGITIS: ICD-10-CM

## 2024-03-22 RX ORDER — PANTOPRAZOLE SODIUM 40 MG/1
40 TABLET, DELAYED RELEASE ORAL DAILY
Qty: 90 TABLET | Refills: 0 | Status: SHIPPED | OUTPATIENT
Start: 2024-03-22 | End: 2024-06-10 | Stop reason: SDUPTHER

## 2024-03-27 ENCOUNTER — E-VISIT (OUTPATIENT)
Dept: PRIMARY CARE | Facility: CLINIC | Age: 89
End: 2024-03-27
Payer: MEDICARE

## 2024-03-28 DIAGNOSIS — E78.2 MIXED HYPERLIPIDEMIA: Primary | ICD-10-CM

## 2024-03-28 RX ORDER — ROSUVASTATIN CALCIUM 40 MG/1
40 TABLET, COATED ORAL DAILY
Qty: 90 TABLET | Refills: 1 | Status: SHIPPED | OUTPATIENT
Start: 2024-03-28 | End: 2024-06-10 | Stop reason: SDUPTHER

## 2024-03-28 NOTE — TELEPHONE ENCOUNTER
From: Mariaelena Tavarez  To: Neeraj Perez MD  Sent: 3/27/2024 4:32 PM EDT  Subject: rosuvastatin 40 mg tablet Refill    I need a refill on my rosuvastatin 40 mg tablet called into [facility] in [place].    Thank you,  Mariaelena

## 2024-04-02 ENCOUNTER — LAB (OUTPATIENT)
Dept: LAB | Facility: LAB | Age: 89
End: 2024-04-02
Payer: MEDICARE

## 2024-04-02 DIAGNOSIS — E78.2 MIXED HYPERLIPIDEMIA: ICD-10-CM

## 2024-04-02 DIAGNOSIS — R79.0 LOW MAGNESIUM LEVEL: ICD-10-CM

## 2024-04-02 DIAGNOSIS — R53.82 CHRONIC FATIGUE: ICD-10-CM

## 2024-04-02 LAB
ALBUMIN SERPL BCP-MCNC: 4.1 G/DL (ref 3.4–5)
ALP SERPL-CCNC: 71 U/L (ref 33–136)
ALT SERPL W P-5'-P-CCNC: 17 U/L (ref 7–45)
ANION GAP SERPL CALC-SCNC: 9 MMOL/L (ref 10–20)
AST SERPL W P-5'-P-CCNC: 17 U/L (ref 9–39)
BASOPHILS # BLD AUTO: 0.03 X10*3/UL (ref 0–0.1)
BASOPHILS NFR BLD AUTO: 0.5 %
BILIRUB SERPL-MCNC: 0.8 MG/DL (ref 0–1.2)
BUN SERPL-MCNC: 15 MG/DL (ref 6–23)
CALCIUM SERPL-MCNC: 9.5 MG/DL (ref 8.6–10.3)
CHLORIDE SERPL-SCNC: 102 MMOL/L (ref 98–107)
CO2 SERPL-SCNC: 32 MMOL/L (ref 21–32)
CREAT SERPL-MCNC: 0.81 MG/DL (ref 0.5–1.05)
EGFRCR SERPLBLD CKD-EPI 2021: 70 ML/MIN/1.73M*2
EOSINOPHIL # BLD AUTO: 0.09 X10*3/UL (ref 0–0.4)
EOSINOPHIL NFR BLD AUTO: 1.6 %
ERYTHROCYTE [DISTWIDTH] IN BLOOD BY AUTOMATED COUNT: 12.3 % (ref 11.5–14.5)
GLUCOSE SERPL-MCNC: 89 MG/DL (ref 74–99)
HCT VFR BLD AUTO: 39.6 % (ref 36–46)
HGB BLD-MCNC: 13 G/DL (ref 12–16)
IMM GRANULOCYTES # BLD AUTO: 0.02 X10*3/UL (ref 0–0.5)
IMM GRANULOCYTES NFR BLD AUTO: 0.4 % (ref 0–0.9)
LYMPHOCYTES # BLD AUTO: 1.7 X10*3/UL (ref 0.8–3)
LYMPHOCYTES NFR BLD AUTO: 30.4 %
MAGNESIUM SERPL-MCNC: 2.15 MG/DL (ref 1.6–2.4)
MCH RBC QN AUTO: 31.6 PG (ref 26–34)
MCHC RBC AUTO-ENTMCNC: 32.8 G/DL (ref 32–36)
MCV RBC AUTO: 96 FL (ref 80–100)
MONOCYTES # BLD AUTO: 0.55 X10*3/UL (ref 0.05–0.8)
MONOCYTES NFR BLD AUTO: 9.8 %
NEUTROPHILS # BLD AUTO: 3.2 X10*3/UL (ref 1.6–5.5)
NEUTROPHILS NFR BLD AUTO: 57.3 %
NRBC BLD-RTO: 0 /100 WBCS (ref 0–0)
PLATELET # BLD AUTO: 203 X10*3/UL (ref 150–450)
POTASSIUM SERPL-SCNC: 3.6 MMOL/L (ref 3.5–5.3)
PROT SERPL-MCNC: 6.4 G/DL (ref 6.4–8.2)
RBC # BLD AUTO: 4.11 X10*6/UL (ref 4–5.2)
SODIUM SERPL-SCNC: 139 MMOL/L (ref 136–145)
WBC # BLD AUTO: 5.6 X10*3/UL (ref 4.4–11.3)

## 2024-04-02 PROCEDURE — 36415 COLL VENOUS BLD VENIPUNCTURE: CPT

## 2024-04-15 ENCOUNTER — OFFICE VISIT (OUTPATIENT)
Dept: PRIMARY CARE | Facility: CLINIC | Age: 89
End: 2024-04-15
Payer: MEDICARE

## 2024-04-15 VITALS
HEIGHT: 60 IN | DIASTOLIC BLOOD PRESSURE: 70 MMHG | WEIGHT: 87 LBS | OXYGEN SATURATION: 99 % | HEART RATE: 60 BPM | SYSTOLIC BLOOD PRESSURE: 124 MMHG | BODY MASS INDEX: 17.08 KG/M2

## 2024-04-15 DIAGNOSIS — I10 PRIMARY HYPERTENSION: ICD-10-CM

## 2024-04-15 DIAGNOSIS — R41.3 MEMORY LOSS: ICD-10-CM

## 2024-04-15 DIAGNOSIS — E55.9 VITAMIN D DEFICIENCY: ICD-10-CM

## 2024-04-15 DIAGNOSIS — R26.9 GAIT ABNORMALITY: ICD-10-CM

## 2024-04-15 DIAGNOSIS — E03.9 ACQUIRED HYPOTHYROIDISM: ICD-10-CM

## 2024-04-15 DIAGNOSIS — R26.89 BALANCE DISORDER: ICD-10-CM

## 2024-04-15 DIAGNOSIS — F41.9 ANXIETY: ICD-10-CM

## 2024-04-15 DIAGNOSIS — R25.1 TREMOR: Primary | ICD-10-CM

## 2024-04-15 DIAGNOSIS — E78.2 MIXED HYPERLIPIDEMIA: ICD-10-CM

## 2024-04-15 PROCEDURE — 3074F SYST BP LT 130 MM HG: CPT | Performed by: FAMILY MEDICINE

## 2024-04-15 PROCEDURE — G2211 COMPLEX E/M VISIT ADD ON: HCPCS | Performed by: FAMILY MEDICINE

## 2024-04-15 PROCEDURE — 1160F RVW MEDS BY RX/DR IN RCRD: CPT | Performed by: FAMILY MEDICINE

## 2024-04-15 PROCEDURE — 99214 OFFICE O/P EST MOD 30 MIN: CPT | Performed by: FAMILY MEDICINE

## 2024-04-15 PROCEDURE — 1126F AMNT PAIN NOTED NONE PRSNT: CPT | Performed by: FAMILY MEDICINE

## 2024-04-15 PROCEDURE — 1159F MED LIST DOCD IN RCRD: CPT | Performed by: FAMILY MEDICINE

## 2024-04-15 PROCEDURE — 3078F DIAST BP <80 MM HG: CPT | Performed by: FAMILY MEDICINE

## 2024-04-15 PROCEDURE — 1158F ADVNC CARE PLAN TLK DOCD: CPT | Performed by: FAMILY MEDICINE

## 2024-04-15 PROCEDURE — 1123F ACP DISCUSS/DSCN MKR DOCD: CPT | Performed by: FAMILY MEDICINE

## 2024-04-15 ASSESSMENT — ENCOUNTER SYMPTOMS
FATIGUE: 1
DIZZINESS: 0
ARTHRALGIAS: 1
WEAKNESS: 1
HEMATURIA: 0
COUGH: 0
CHILLS: 0
NAUSEA: 0
WHEEZING: 0
VOMITING: 0
MYALGIAS: 1
SHORTNESS OF BREATH: 0
FEVER: 0
LOSS OF SENSATION IN FEET: 0
DEPRESSION: 0
OCCASIONAL FEELINGS OF UNSTEADINESS: 0
TREMORS: 1
FREQUENCY: 0
PALPITATIONS: 0
CONSTIPATION: 0

## 2024-04-15 ASSESSMENT — PAIN SCALES - GENERAL: PAINLEVEL: 0-NO PAIN

## 2024-04-15 ASSESSMENT — PATIENT HEALTH QUESTIONNAIRE - PHQ9
1. LITTLE INTEREST OR PLEASURE IN DOING THINGS: NOT AT ALL
2. FEELING DOWN, DEPRESSED OR HOPELESS: NOT AT ALL
SUM OF ALL RESPONSES TO PHQ9 QUESTIONS 1 AND 2: 0

## 2024-04-15 NOTE — PROGRESS NOTES
Subjective   Patient ID: Mariaelena Tavarez is a 88 y.o. female who presents for Follow-up.       Anxiety:          Anxiety F/U- improved anxiety on lexapro- she switched to gabapentin at night - she sleeps better and she feels less anxious. . She is having a slight increase in her anxiety - wants to add a low dose med to help         Medications fluoxitine did not help, gabapentin is helpful.          Stressors stable.          Supports significant, support from family, support from friends.          Mood stable, at patient's baseline.          Sleep disturbance none.          Energy change normal energy.          Concentration improving.          Suicidal ideations none.      Hypertension:          Patient here for F/U. at goal.          Med compliance yes.          Med side effects none.      Memory Loss:          Memory loss she has short term memory loss - slight improvement on generic aricept, she stopped her namenda and does not want to restart it.     Hypothyroidism:          Follow Up taking hormone supplement routinely, due for a tsh.          Hypothyroidism tolerating meds with no side effects.      GERD:          GERD F/U occasional symptoms -she did well on pantoprazole in the past, having break through symptoms.          medicine failed on pepcid alone.          she has irritated taste buds from her gerd.     Hyperlipidemia:          Patient here for F/U. tolerating medicine well, stable.          Labs ordered today.   Low back pain with intermittent radicular pain - some balance concerns and pain from her osteoarthritis - concerns with a tremor, flat affect and increased stiffness- she has a strong family history of parkinsons - she is unable to drive- - does agree to PT at her home         Review of Systems   Constitutional:  Positive for fatigue. Negative for chills and fever.   HENT:  Negative for ear pain, postnasal drip and tinnitus.    Respiratory:  Negative for cough, shortness of breath and  wheezing.    Cardiovascular:  Negative for chest pain, palpitations and leg swelling.   Gastrointestinal:  Negative for constipation, nausea and vomiting.   Endocrine: Negative for cold intolerance.   Genitourinary:  Negative for frequency and hematuria.   Musculoskeletal:  Positive for arthralgias and myalgias.   Neurological:  Positive for tremors and weakness. Negative for dizziness.       Objective   /70   Pulse 60   Ht 1.524 m (5')   Wt (!) 39.5 kg (87 lb)   SpO2 99%   BMI 16.99 kg/m²     Physical Exam  Vitals reviewed.   Constitutional:       General: She is in acute distress.      Appearance: Normal appearance. She is not ill-appearing.   HENT:      Right Ear: Tympanic membrane normal.      Left Ear: Tympanic membrane normal.   Cardiovascular:      Rate and Rhythm: Normal rate and regular rhythm.      Heart sounds: Normal heart sounds. No murmur heard.  Pulmonary:      Breath sounds: Normal breath sounds.   Abdominal:      General: Abdomen is flat. Bowel sounds are normal.      Palpations: Abdomen is soft.   Musculoskeletal:         General: No swelling.      Cervical back: Neck supple.      Comments: Diffuse low back pain   Skin:     Findings: No rash.   Neurological:      General: No focal deficit present.      Mental Status: She is alert and oriented to person, place, and time.      Comments: She has a flat affect, resting tremor and shuffled gait with ambulation   Psychiatric:         Mood and Affect: Mood normal.         Behavior: Behavior normal.         Assessment/Plan   Problem List Items Addressed This Visit             ICD-10-CM    Anxiety F41.9    Memory loss R41.3    Hyperlipidemia E78.5    Relevant Orders    Comprehensive Metabolic Panel    Lipid Panel    Hypertension I10    Hypothyroid E03.9    Relevant Orders    TSH with reflex to Free T4 if abnormal    Thyroxine, Free    Vitamin D deficiency E55.9    Relevant Orders    Vitamin D 25-Hydroxy,Total (for eval of Vitamin D levels)      Other Visit Diagnoses         Codes    Tremor    -  Primary R25.1    Gait abnormality     R26.9    home PT eval and treat     Balance disorder     R26.89    Home PT eval and treat

## 2024-04-17 ENCOUNTER — TELEPHONE (OUTPATIENT)
Dept: PRIMARY CARE | Facility: CLINIC | Age: 89
End: 2024-04-17
Payer: MEDICARE

## 2024-04-17 DIAGNOSIS — G20.A1 PARKINSON'S DISEASE, UNSPECIFIED WHETHER DYSKINESIA PRESENT, UNSPECIFIED WHETHER MANIFESTATIONS FLUCTUATE (MULTI): ICD-10-CM

## 2024-04-17 NOTE — TELEPHONE ENCOUNTER
Please call her or her daughter Monica Ugalde to let them know I referred her to a neurologist to do testing for parkinson's - then they can start treatment as well. Also - please order home PT for new onset Parkinsons - she is unable to drive

## 2024-04-22 ENCOUNTER — HOME HEALTH ADMISSION (OUTPATIENT)
Dept: HOME HEALTH SERVICES | Facility: HOME HEALTH | Age: 89
End: 2024-04-22
Payer: MEDICARE

## 2024-04-24 ENCOUNTER — HOME CARE VISIT (OUTPATIENT)
Dept: HOME HEALTH SERVICES | Facility: HOME HEALTH | Age: 89
End: 2024-04-24

## 2024-06-10 ENCOUNTER — E-VISIT (OUTPATIENT)
Dept: PRIMARY CARE | Facility: CLINIC | Age: 89
End: 2024-06-10
Payer: MEDICARE

## 2024-06-10 DIAGNOSIS — G62.9 NEUROPATHY: ICD-10-CM

## 2024-06-10 DIAGNOSIS — R41.3 MEMORY LOSS: Primary | ICD-10-CM

## 2024-06-10 DIAGNOSIS — I10 PRIMARY HYPERTENSION: ICD-10-CM

## 2024-06-10 DIAGNOSIS — K21.9 GASTROESOPHAGEAL REFLUX DISEASE WITHOUT ESOPHAGITIS: ICD-10-CM

## 2024-06-10 DIAGNOSIS — E03.9 ACQUIRED HYPOTHYROIDISM: ICD-10-CM

## 2024-06-10 DIAGNOSIS — E78.2 MIXED HYPERLIPIDEMIA: ICD-10-CM

## 2024-06-10 DIAGNOSIS — F41.9 ANXIETY: ICD-10-CM

## 2024-06-10 RX ORDER — ATENOLOL 25 MG/1
25 TABLET ORAL 2 TIMES DAILY
Qty: 180 TABLET | Refills: 3 | Status: SHIPPED | OUTPATIENT
Start: 2024-06-10 | End: 2025-06-10

## 2024-06-10 RX ORDER — PANTOPRAZOLE SODIUM 40 MG/1
40 TABLET, DELAYED RELEASE ORAL DAILY
Qty: 90 TABLET | Refills: 3 | Status: SHIPPED | OUTPATIENT
Start: 2024-06-10

## 2024-06-10 RX ORDER — AMLODIPINE BESYLATE 10 MG/1
10 TABLET ORAL DAILY
Qty: 90 TABLET | Refills: 3 | Status: SHIPPED | OUTPATIENT
Start: 2024-06-10

## 2024-06-10 RX ORDER — GABAPENTIN 100 MG/1
CAPSULE ORAL
Qty: 270 CAPSULE | Refills: 3 | Status: SHIPPED | OUTPATIENT
Start: 2024-06-10

## 2024-06-10 RX ORDER — ESCITALOPRAM OXALATE 5 MG/1
TABLET ORAL
Qty: 90 TABLET | Refills: 3 | Status: SHIPPED | OUTPATIENT
Start: 2024-06-10

## 2024-06-10 RX ORDER — LEVOTHYROXINE SODIUM 25 UG/1
25 TABLET ORAL
Qty: 90 TABLET | Refills: 3 | Status: SHIPPED | OUTPATIENT
Start: 2024-06-10

## 2024-06-10 RX ORDER — ROSUVASTATIN CALCIUM 40 MG/1
40 TABLET, COATED ORAL DAILY
Qty: 90 TABLET | Refills: 3 | Status: SHIPPED | OUTPATIENT
Start: 2024-06-10 | End: 2025-06-10

## 2024-06-10 RX ORDER — DONEPEZIL HYDROCHLORIDE 10 MG/1
10 TABLET, FILM COATED ORAL NIGHTLY
Qty: 90 TABLET | Refills: 3 | Status: SHIPPED | OUTPATIENT
Start: 2024-06-10 | End: 2025-06-10

## 2024-06-10 NOTE — TELEPHONE ENCOUNTER
From: Mariaelena Tavarez  To: Neeraj Perez  Sent: 6/10/2024 8:49 AM EDT  Subject: Medication Refills - send to [facility]    Atenolol 25 mg  Escitalopram Oxalate 5mg  Amlodipine Besylate 10mg  Gabapentin 100 mg  Rosuvastatin Calcium 40mg  Donepezil Hcl 10mg  Pantoprazole Sodium 40 mg  Levothyroxine 25    Please send to [facility], [address], [place] [address] [phone].    Thank you!!

## 2024-07-03 DIAGNOSIS — R41.3 MEMORY LOSS: ICD-10-CM

## 2024-07-03 RX ORDER — DONEPEZIL HYDROCHLORIDE 10 MG/1
TABLET, FILM COATED ORAL
Qty: 90 TABLET | Refills: 1 | Status: SHIPPED | OUTPATIENT
Start: 2024-07-03

## 2024-08-23 ENCOUNTER — APPOINTMENT (OUTPATIENT)
Dept: RADIOLOGY | Facility: HOSPITAL | Age: 89
DRG: 560 | End: 2024-08-23
Payer: MEDICARE

## 2024-08-23 ENCOUNTER — HOSPITAL ENCOUNTER (INPATIENT)
Facility: HOSPITAL | Age: 89
DRG: 560 | End: 2024-08-23
Admitting: INTERNAL MEDICINE
Payer: MEDICARE

## 2024-08-23 ENCOUNTER — APPOINTMENT (OUTPATIENT)
Dept: CARDIOLOGY | Facility: HOSPITAL | Age: 89
DRG: 560 | End: 2024-08-23
Payer: MEDICARE

## 2024-08-23 DIAGNOSIS — T79.6XXA TRAUMATIC RHABDOMYOLYSIS, INITIAL ENCOUNTER (CMS-HCC): ICD-10-CM

## 2024-08-23 DIAGNOSIS — S73.004A HIP DISLOCATION, RIGHT, INITIAL ENCOUNTER (MULTI): ICD-10-CM
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
## 2024-08-23 PROBLEM — M62.82 RHABDOMYOLYSIS: Status: ACTIVE | Noted: 2024-08-23

## 2024-08-23 LAB
ALBUMIN SERPL-MCNC: 3.7 G/DL (ref 3.5–5)
ALBUMIN SERPL-MCNC: 4.4 G/DL (ref 3.5–5)
ALP BLD-CCNC: 69 U/L (ref 35–125)
ALP BLD-CCNC: 87 U/L (ref 35–125)
ALT SERPL-CCNC: 41 U/L (ref 5–40)
ALT SERPL-CCNC: 45 U/L (ref 5–40)
ANION GAP SERPL CALC-SCNC: 12 MMOL/L
ANION GAP SERPL CALC-SCNC: 13 MMOL/L
APPEARANCE UR: CLEAR
AST SERPL-CCNC: 87 U/L (ref 5–40)
AST SERPL-CCNC: 95 U/L (ref 5–40)
BASOPHILS # BLD AUTO: 0.01 X10*3/UL (ref 0–0.1)
BASOPHILS NFR BLD AUTO: 0.1 %
BILIRUB SERPL-MCNC: 1.8 MG/DL (ref 0.1–1.2)
BILIRUB SERPL-MCNC: 2.4 MG/DL (ref 0.1–1.2)
BILIRUB UR STRIP.AUTO-MCNC: NEGATIVE MG/DL
BUN SERPL-MCNC: 15 MG/DL (ref 8–25)
BUN SERPL-MCNC: 19 MG/DL (ref 8–25)
CALCIUM SERPL-MCNC: 10.1 MG/DL (ref 8.5–10.4)
CALCIUM SERPL-MCNC: 8.7 MG/DL (ref 8.5–10.4)
CHLORIDE SERPL-SCNC: 91 MMOL/L (ref 97–107)
CHLORIDE SERPL-SCNC: 98 MMOL/L (ref 97–107)
CK SERPL-CCNC: 3541 U/L (ref 24–195)
CO2 SERPL-SCNC: 21 MMOL/L (ref 24–31)
CO2 SERPL-SCNC: 27 MMOL/L (ref 24–31)
COLOR UR: ABNORMAL
CREAT SERPL-MCNC: 0.5 MG/DL (ref 0.4–1.6)
CREAT SERPL-MCNC: 0.6 MG/DL (ref 0.4–1.6)
EGFRCR SERPLBLD CKD-EPI 2021: 86 ML/MIN/1.73M*2
EGFRCR SERPLBLD CKD-EPI 2021: 90 ML/MIN/1.73M*2
EOSINOPHIL # BLD AUTO: 0 X10*3/UL (ref 0–0.4)
EOSINOPHIL NFR BLD AUTO: 0 %
ERYTHROCYTE [DISTWIDTH] IN BLOOD BY AUTOMATED COUNT: 12.3 % (ref 11.5–14.5)
GLUCOSE SERPL-MCNC: 109 MG/DL (ref 65–99)
GLUCOSE SERPL-MCNC: 161 MG/DL (ref 65–99)
GLUCOSE UR STRIP.AUTO-MCNC: NORMAL MG/DL
HCT VFR BLD AUTO: 43.7 % (ref 36–46)
HGB BLD-MCNC: 15.2 G/DL (ref 12–16)
IMM GRANULOCYTES # BLD AUTO: 0.04 X10*3/UL (ref 0–0.5)
IMM GRANULOCYTES NFR BLD AUTO: 0.3 % (ref 0–0.9)
KETONES UR STRIP.AUTO-MCNC: ABNORMAL MG/DL
LEUKOCYTE ESTERASE UR QL STRIP.AUTO: NEGATIVE
LYMPHOCYTES # BLD AUTO: 1 X10*3/UL (ref 0.8–3)
LYMPHOCYTES NFR BLD AUTO: 8.3 %
MAGNESIUM SERPL-MCNC: 2.3 MG/DL (ref 1.6–3.1)
MCH RBC QN AUTO: 31.5 PG (ref 26–34)
MCHC RBC AUTO-ENTMCNC: 34.8 G/DL (ref 32–36)
MCV RBC AUTO: 91 FL (ref 80–100)
MONOCYTES # BLD AUTO: 1.05 X10*3/UL (ref 0.05–0.8)
MONOCYTES NFR BLD AUTO: 8.7 %
MUCOUS THREADS #/AREA URNS AUTO: NORMAL /LPF
NEUTROPHILS # BLD AUTO: 9.98 X10*3/UL (ref 1.6–5.5)
NEUTROPHILS NFR BLD AUTO: 82.6 %
NITRITE UR QL STRIP.AUTO: NEGATIVE
NRBC BLD-RTO: 0 /100 WBCS (ref 0–0)
PH UR STRIP.AUTO: 6 [PH]
PHOSPHATE SERPL-MCNC: 3.2 MG/DL (ref 2.5–4.5)
PLATELET # BLD AUTO: 216 X10*3/UL (ref 150–450)
POTASSIUM SERPL-SCNC: 3.3 MMOL/L (ref 3.4–5.1)
POTASSIUM SERPL-SCNC: 3.3 MMOL/L (ref 3.4–5.1)
PROT SERPL-MCNC: 6.3 G/DL (ref 5.9–7.9)
PROT SERPL-MCNC: 7.4 G/DL (ref 5.9–7.9)
PROT UR STRIP.AUTO-MCNC: ABNORMAL MG/DL
RBC # BLD AUTO: 4.83 X10*6/UL (ref 4–5.2)
RBC # UR STRIP.AUTO: ABNORMAL /UL
RBC #/AREA URNS AUTO: NORMAL /HPF
SODIUM SERPL-SCNC: 130 MMOL/L (ref 133–145)
SODIUM SERPL-SCNC: 132 MMOL/L (ref 133–145)
SP GR UR STRIP.AUTO: 1.01
UROBILINOGEN UR STRIP.AUTO-MCNC: NORMAL MG/DL
WBC # BLD AUTO: 12.1 X10*3/UL (ref 4.4–11.3)
WBC #/AREA URNS AUTO: NORMAL /HPF

## 2024-08-23 PROCEDURE — 73552 X-RAY EXAM OF FEMUR 2/>: CPT | Mod: RIGHT SIDE | Performed by: RADIOLOGY

## 2024-08-23 PROCEDURE — 73552 X-RAY EXAM OF FEMUR 2/>: CPT | Mod: RT

## 2024-08-23 PROCEDURE — 2500000001 HC RX 250 WO HCPCS SELF ADMINISTERED DRUGS (ALT 637 FOR MEDICARE OP): Performed by: NURSE PRACTITIONER

## 2024-08-23 PROCEDURE — 1200000002 HC GENERAL ROOM WITH TELEMETRY DAILY

## 2024-08-23 PROCEDURE — 99152 MOD SED SAME PHYS/QHP 5/>YRS: CPT

## 2024-08-23 PROCEDURE — 96375 TX/PRO/DX INJ NEW DRUG ADDON: CPT | Mod: 59

## 2024-08-23 PROCEDURE — 80053 COMPREHEN METABOLIC PANEL: CPT

## 2024-08-23 PROCEDURE — 73080 X-RAY EXAM OF ELBOW: CPT | Mod: BILATERAL PROCEDURE | Performed by: RADIOLOGY

## 2024-08-23 PROCEDURE — 36415 COLL VENOUS BLD VENIPUNCTURE: CPT

## 2024-08-23 PROCEDURE — 85025 COMPLETE CBC W/AUTO DIFF WBC: CPT

## 2024-08-23 PROCEDURE — 96374 THER/PROPH/DIAG INJ IV PUSH: CPT | Mod: 59

## 2024-08-23 PROCEDURE — 72170 X-RAY EXAM OF PELVIS: CPT | Performed by: RADIOLOGY

## 2024-08-23 PROCEDURE — 81001 URINALYSIS AUTO W/SCOPE: CPT

## 2024-08-23 PROCEDURE — 99285 EMERGENCY DEPT VISIT HI MDM: CPT | Mod: 25

## 2024-08-23 PROCEDURE — 2500000004 HC RX 250 GENERAL PHARMACY W/ HCPCS (ALT 636 FOR OP/ED)

## 2024-08-23 PROCEDURE — 72125 CT NECK SPINE W/O DYE: CPT | Performed by: RADIOLOGY

## 2024-08-23 PROCEDURE — 82550 ASSAY OF CK (CPK): CPT

## 2024-08-23 PROCEDURE — 73080 X-RAY EXAM OF ELBOW: CPT | Mod: 50

## 2024-08-23 PROCEDURE — 83735 ASSAY OF MAGNESIUM: CPT

## 2024-08-23 PROCEDURE — 72125 CT NECK SPINE W/O DYE: CPT

## 2024-08-23 PROCEDURE — 36415 COLL VENOUS BLD VENIPUNCTURE: CPT | Performed by: NURSE PRACTITIONER

## 2024-08-23 PROCEDURE — 72170 X-RAY EXAM OF PELVIS: CPT

## 2024-08-23 PROCEDURE — 84100 ASSAY OF PHOSPHORUS: CPT

## 2024-08-23 PROCEDURE — 27265 TREAT HIP DISLOCATION: CPT

## 2024-08-23 PROCEDURE — 2500000004 HC RX 250 GENERAL PHARMACY W/ HCPCS (ALT 636 FOR OP/ED): Performed by: NURSE PRACTITIONER

## 2024-08-23 PROCEDURE — 96361 HYDRATE IV INFUSION ADD-ON: CPT | Mod: 59

## 2024-08-23 PROCEDURE — 2500000002 HC RX 250 W HCPCS SELF ADMINISTERED DRUGS (ALT 637 FOR MEDICARE OP, ALT 636 FOR OP/ED): Performed by: NURSE PRACTITIONER

## 2024-08-23 PROCEDURE — 2500000005 HC RX 250 GENERAL PHARMACY W/O HCPCS

## 2024-08-23 PROCEDURE — P9612 CATHETERIZE FOR URINE SPEC: HCPCS

## 2024-08-23 PROCEDURE — 70450 CT HEAD/BRAIN W/O DYE: CPT | Performed by: RADIOLOGY

## 2024-08-23 PROCEDURE — 70450 CT HEAD/BRAIN W/O DYE: CPT

## 2024-08-23 PROCEDURE — 80053 COMPREHEN METABOLIC PANEL: CPT | Performed by: NURSE PRACTITIONER

## 2024-08-23 PROCEDURE — 93005 ELECTROCARDIOGRAM TRACING: CPT

## 2024-08-23 RX ORDER — GABAPENTIN 100 MG/1
100 CAPSULE ORAL EVERY MORNING
Status: DISCONTINUED | OUTPATIENT
Start: 2024-08-24 | End: 2024-08-26 | Stop reason: HOSPADM

## 2024-08-23 RX ORDER — CHOLECALCIFEROL (VITAMIN D3) 50 MCG
2000 TABLET ORAL DAILY
Status: DISCONTINUED | OUTPATIENT
Start: 2024-08-23 | End: 2024-08-26 | Stop reason: HOSPADM

## 2024-08-23 RX ORDER — LANOLIN ALCOHOL/MO/W.PET/CERES
1000 CREAM (GRAM) TOPICAL DAILY
Status: DISCONTINUED | OUTPATIENT
Start: 2024-08-23 | End: 2024-08-26 | Stop reason: HOSPADM

## 2024-08-23 RX ORDER — KETAMINE HYDROCHLORIDE 50 MG/ML
0.5 INJECTION, SOLUTION INTRAMUSCULAR; INTRAVENOUS ONCE
Status: COMPLETED | OUTPATIENT
Start: 2024-08-23 | End: 2024-08-23

## 2024-08-23 RX ORDER — PROPOFOL 10 MG/ML
0.5 INJECTION, EMULSION INTRAVENOUS ONCE
Status: COMPLETED | OUTPATIENT
Start: 2024-08-23 | End: 2024-08-23

## 2024-08-23 RX ORDER — BISACODYL 10 MG/1
10 SUPPOSITORY RECTAL DAILY PRN
Status: DISCONTINUED | OUTPATIENT
Start: 2024-08-23 | End: 2024-08-26 | Stop reason: HOSPADM

## 2024-08-23 RX ORDER — DONEPEZIL HYDROCHLORIDE 10 MG/1
10 TABLET, FILM COATED ORAL NIGHTLY
Status: DISCONTINUED | OUTPATIENT
Start: 2024-08-23 | End: 2024-08-26 | Stop reason: HOSPADM

## 2024-08-23 RX ORDER — ROSUVASTATIN CALCIUM 20 MG/1
40 TABLET, COATED ORAL NIGHTLY
Status: DISCONTINUED | OUTPATIENT
Start: 2024-08-23 | End: 2024-08-26 | Stop reason: HOSPADM

## 2024-08-23 RX ORDER — ESCITALOPRAM OXALATE 10 MG/1
5 TABLET ORAL DAILY
Status: DISCONTINUED | OUTPATIENT
Start: 2024-08-23 | End: 2024-08-26 | Stop reason: HOSPADM

## 2024-08-23 RX ORDER — POLYETHYLENE GLYCOL 3350 17 G/17G
17 POWDER, FOR SOLUTION ORAL AS NEEDED
Status: DISCONTINUED | OUTPATIENT
Start: 2024-08-23 | End: 2024-08-26 | Stop reason: HOSPADM

## 2024-08-23 RX ORDER — PANTOPRAZOLE SODIUM 40 MG/10ML
40 INJECTION, POWDER, LYOPHILIZED, FOR SOLUTION INTRAVENOUS
Status: DISCONTINUED | OUTPATIENT
Start: 2024-08-24 | End: 2024-08-26 | Stop reason: HOSPADM

## 2024-08-23 RX ORDER — ONDANSETRON HYDROCHLORIDE 2 MG/ML
4 INJECTION, SOLUTION INTRAVENOUS EVERY 8 HOURS PRN
Status: DISCONTINUED | OUTPATIENT
Start: 2024-08-23 | End: 2024-08-26 | Stop reason: HOSPADM

## 2024-08-23 RX ORDER — ACETAMINOPHEN 650 MG/1
650 SUPPOSITORY RECTAL EVERY 4 HOURS PRN
Status: DISCONTINUED | OUTPATIENT
Start: 2024-08-23 | End: 2024-08-23

## 2024-08-23 RX ORDER — TRAMADOL HYDROCHLORIDE 50 MG/1
50 TABLET ORAL EVERY 8 HOURS PRN
Status: DISCONTINUED | OUTPATIENT
Start: 2024-08-23 | End: 2024-08-26 | Stop reason: HOSPADM

## 2024-08-23 RX ORDER — PANTOPRAZOLE SODIUM 40 MG/1
40 TABLET, DELAYED RELEASE ORAL DAILY
Status: DISCONTINUED | OUTPATIENT
Start: 2024-08-23 | End: 2024-08-23

## 2024-08-23 RX ORDER — ACETAMINOPHEN 160 MG/5ML
650 SOLUTION ORAL EVERY 4 HOURS PRN
Status: DISCONTINUED | OUTPATIENT
Start: 2024-08-23 | End: 2024-08-23

## 2024-08-23 RX ORDER — BISACODYL 5 MG
10 TABLET, DELAYED RELEASE (ENTERIC COATED) ORAL DAILY PRN
Status: DISCONTINUED | OUTPATIENT
Start: 2024-08-23 | End: 2024-08-26 | Stop reason: HOSPADM

## 2024-08-23 RX ORDER — KETAMINE HYDROCHLORIDE 50 MG/ML
0.5 INJECTION, SOLUTION INTRAMUSCULAR; INTRAVENOUS AS NEEDED
Status: DISCONTINUED | OUTPATIENT
Start: 2024-08-23 | End: 2024-08-24

## 2024-08-23 RX ORDER — ACETAMINOPHEN 160 MG/5ML
650 SOLUTION ORAL EVERY 4 HOURS PRN
Status: DISCONTINUED | OUTPATIENT
Start: 2024-08-23 | End: 2024-08-26 | Stop reason: HOSPADM

## 2024-08-23 RX ORDER — ACETAMINOPHEN 650 MG/1
650 SUPPOSITORY RECTAL EVERY 4 HOURS PRN
Status: DISCONTINUED | OUTPATIENT
Start: 2024-08-23 | End: 2024-08-26 | Stop reason: HOSPADM

## 2024-08-23 RX ORDER — PROCHLORPERAZINE 25 MG/1
25 SUPPOSITORY RECTAL EVERY 12 HOURS PRN
Status: DISCONTINUED | OUTPATIENT
Start: 2024-08-23 | End: 2024-08-26 | Stop reason: HOSPADM

## 2024-08-23 RX ORDER — GABAPENTIN 100 MG/1
200 CAPSULE ORAL NIGHTLY
Status: DISCONTINUED | OUTPATIENT
Start: 2024-08-23 | End: 2024-08-26 | Stop reason: HOSPADM

## 2024-08-23 RX ORDER — ONDANSETRON 4 MG/1
4 TABLET, ORALLY DISINTEGRATING ORAL EVERY 8 HOURS PRN
Status: DISCONTINUED | OUTPATIENT
Start: 2024-08-23 | End: 2024-08-26 | Stop reason: HOSPADM

## 2024-08-23 RX ORDER — DEXTROSE MONOHYDRATE AND SODIUM CHLORIDE 5; .45 G/100ML; G/100ML
100 INJECTION, SOLUTION INTRAVENOUS CONTINUOUS
Status: DISCONTINUED | OUTPATIENT
Start: 2024-08-23 | End: 2024-08-24

## 2024-08-23 RX ORDER — PANTOPRAZOLE SODIUM 40 MG/1
40 TABLET, DELAYED RELEASE ORAL
Status: DISCONTINUED | OUTPATIENT
Start: 2024-08-24 | End: 2024-08-26 | Stop reason: HOSPADM

## 2024-08-23 RX ORDER — GUAIFENESIN/DEXTROMETHORPHAN 100-10MG/5
5 SYRUP ORAL EVERY 4 HOURS PRN
Status: DISCONTINUED | OUTPATIENT
Start: 2024-08-23 | End: 2024-08-26 | Stop reason: HOSPADM

## 2024-08-23 RX ORDER — ATENOLOL 25 MG/1
25 TABLET ORAL 2 TIMES DAILY
Status: DISCONTINUED | OUTPATIENT
Start: 2024-08-23 | End: 2024-08-26 | Stop reason: HOSPADM

## 2024-08-23 RX ORDER — LEVOTHYROXINE SODIUM 25 UG/1
25 TABLET ORAL
Status: DISCONTINUED | OUTPATIENT
Start: 2024-08-24 | End: 2024-08-26 | Stop reason: HOSPADM

## 2024-08-23 RX ORDER — PROPOFOL 10 MG/ML
0.5 INJECTION, EMULSION INTRAVENOUS AS NEEDED
Status: DISCONTINUED | OUTPATIENT
Start: 2024-08-23 | End: 2024-08-24

## 2024-08-23 RX ORDER — ACETAMINOPHEN 325 MG/1
650 TABLET ORAL EVERY 4 HOURS PRN
Status: DISCONTINUED | OUTPATIENT
Start: 2024-08-23 | End: 2024-08-23

## 2024-08-23 RX ORDER — PROCHLORPERAZINE MALEATE 10 MG
10 TABLET ORAL EVERY 6 HOURS PRN
Status: DISCONTINUED | OUTPATIENT
Start: 2024-08-23 | End: 2024-08-26 | Stop reason: HOSPADM

## 2024-08-23 RX ORDER — ACETAMINOPHEN 325 MG/1
650 TABLET ORAL EVERY 4 HOURS PRN
Status: DISCONTINUED | OUTPATIENT
Start: 2024-08-23 | End: 2024-08-26 | Stop reason: HOSPADM

## 2024-08-23 RX ORDER — FENTANYL CITRATE 50 UG/ML
25 INJECTION, SOLUTION INTRAMUSCULAR; INTRAVENOUS ONCE
Status: COMPLETED | OUTPATIENT
Start: 2024-08-23 | End: 2024-08-23

## 2024-08-23 RX ORDER — PROCHLORPERAZINE EDISYLATE 5 MG/ML
10 INJECTION INTRAMUSCULAR; INTRAVENOUS EVERY 6 HOURS PRN
Status: DISCONTINUED | OUTPATIENT
Start: 2024-08-23 | End: 2024-08-26 | Stop reason: HOSPADM

## 2024-08-23 RX ORDER — NAPROXEN SODIUM 220 MG/1
81 TABLET, FILM COATED ORAL DAILY
Status: DISCONTINUED | OUTPATIENT
Start: 2024-08-23 | End: 2024-08-26 | Stop reason: HOSPADM

## 2024-08-23 RX ORDER — POTASSIUM CHLORIDE 20 MEQ/1
40 TABLET, EXTENDED RELEASE ORAL ONCE
Status: COMPLETED | OUTPATIENT
Start: 2024-08-23 | End: 2024-08-23

## 2024-08-23 RX ORDER — HEPARIN SODIUM 5000 [USP'U]/ML
5000 INJECTION, SOLUTION INTRAVENOUS; SUBCUTANEOUS 2 TIMES DAILY
Status: DISCONTINUED | OUTPATIENT
Start: 2024-08-23 | End: 2024-08-26 | Stop reason: HOSPADM

## 2024-08-23 RX ORDER — AMLODIPINE BESYLATE 10 MG/1
10 TABLET ORAL DAILY
Status: DISCONTINUED | OUTPATIENT
Start: 2024-08-23 | End: 2024-08-26 | Stop reason: HOSPADM

## 2024-08-23 RX ADMIN — ROSUVASTATIN CALCIUM 40 MG: 20 TABLET, COATED ORAL at 21:16

## 2024-08-23 RX ADMIN — DONEPEZIL HYDROCHLORIDE 10 MG: 10 TABLET, FILM COATED ORAL at 21:16

## 2024-08-23 RX ADMIN — KETAMINE HYDROCHLORIDE 20 MG: 50 INJECTION INTRAMUSCULAR; INTRAVENOUS at 14:48

## 2024-08-23 RX ADMIN — FENTANYL CITRATE 25 MCG: 50 INJECTION INTRAMUSCULAR; INTRAVENOUS at 14:50

## 2024-08-23 RX ADMIN — ATENOLOL 25 MG: 25 TABLET ORAL at 21:16

## 2024-08-23 RX ADMIN — SODIUM CHLORIDE 1000 ML: 900 INJECTION, SOLUTION INTRAVENOUS at 13:57

## 2024-08-23 RX ADMIN — PROPOFOL 20 MG: 10 INJECTION, EMULSION INTRAVENOUS at 14:48

## 2024-08-23 RX ADMIN — Medication 2 L/MIN: at 13:45

## 2024-08-23 RX ADMIN — KETAMINE HYDROCHLORIDE 10 MG: 50 INJECTION INTRAMUSCULAR; INTRAVENOUS at 14:58

## 2024-08-23 RX ADMIN — ACETAMINOPHEN 650 MG: 325 TABLET ORAL at 21:16

## 2024-08-23 RX ADMIN — GABAPENTIN 200 MG: 100 CAPSULE ORAL at 21:16

## 2024-08-23 RX ADMIN — POTASSIUM CHLORIDE 40 MEQ: 1500 TABLET, EXTENDED RELEASE ORAL at 18:03

## 2024-08-23 RX ADMIN — DEXTROSE MONOHYDRATE AND SODIUM CHLORIDE 100 ML/HR: 5; .45 INJECTION, SOLUTION INTRAVENOUS at 17:54

## 2024-08-23 RX ADMIN — PROPOFOL 10 MG: 10 INJECTION, EMULSION INTRAVENOUS at 14:58

## 2024-08-23 RX ADMIN — SODIUM CHLORIDE 500 ML: 900 INJECTION, SOLUTION INTRAVENOUS at 12:33

## 2024-08-23 RX ADMIN — HEPARIN SODIUM 5000 UNITS: 5000 INJECTION, SOLUTION INTRAVENOUS; SUBCUTANEOUS at 21:16

## 2024-08-23 SDOH — SOCIAL STABILITY: SOCIAL INSECURITY: DO YOU FEEL ANYONE HAS EXPLOITED OR TAKEN ADVANTAGE OF YOU FINANCIALLY OR OF YOUR PERSONAL PROPERTY?: NO

## 2024-08-23 SDOH — SOCIAL STABILITY: SOCIAL INSECURITY: DOES ANYONE TRY TO KEEP YOU FROM HAVING/CONTACTING OTHER FRIENDS OR DOING THINGS OUTSIDE YOUR HOME?: NO

## 2024-08-23 SDOH — SOCIAL STABILITY: SOCIAL INSECURITY: HAVE YOU HAD THOUGHTS OF HARMING ANYONE ELSE?: NO

## 2024-08-23 SDOH — SOCIAL STABILITY: SOCIAL INSECURITY: WERE YOU ABLE TO COMPLETE ALL THE BEHAVIORAL HEALTH SCREENINGS?: YES

## 2024-08-23 SDOH — SOCIAL STABILITY: SOCIAL INSECURITY: HAS ANYONE EVER THREATENED TO HURT YOUR FAMILY OR YOUR PETS?: NO

## 2024-08-23 SDOH — SOCIAL STABILITY: SOCIAL INSECURITY: ARE YOU OR HAVE YOU BEEN THREATENED OR ABUSED PHYSICALLY, EMOTIONALLY, OR SEXUALLY BY ANYONE?: NO

## 2024-08-23 SDOH — SOCIAL STABILITY: SOCIAL INSECURITY: DO YOU FEEL UNSAFE GOING BACK TO THE PLACE WHERE YOU ARE LIVING?: NO

## 2024-08-23 SDOH — SOCIAL STABILITY: SOCIAL INSECURITY: ABUSE: ADULT

## 2024-08-23 SDOH — SOCIAL STABILITY: SOCIAL INSECURITY: ARE THERE ANY APPARENT SIGNS OF INJURIES/BEHAVIORS THAT COULD BE RELATED TO ABUSE/NEGLECT?: NO

## 2024-08-23 ASSESSMENT — ENCOUNTER SYMPTOMS
COUGH: 0
LIGHT-HEADEDNESS: 0
ENDOCRINE NEGATIVE: 1
SORE THROAT: 0
CONSTIPATION: 0
DIZZINESS: 0
FEVER: 0
UNEXPECTED WEIGHT CHANGE: 0
CARDIOVASCULAR NEGATIVE: 1
NUMBNESS: 0
DIFFICULTY URINATING: 0
PALPITATIONS: 0
DIARRHEA: 0
HEADACHES: 0
ALLERGIC/IMMUNOLOGIC NEGATIVE: 1
WEAKNESS: 1
APNEA: 0
FATIGUE: 1
TREMORS: 0
BLOOD IN STOOL: 0
VOMITING: 0
ACTIVITY CHANGE: 1
NECK STIFFNESS: 0
JOINT SWELLING: 1
PSYCHIATRIC NEGATIVE: 1
ABDOMINAL DISTENTION: 0
RHINORRHEA: 0
FACIAL ASYMMETRY: 0
DIAPHORESIS: 0
EYES NEGATIVE: 1
WHEEZING: 1
NECK PAIN: 0
SHORTNESS OF BREATH: 0
APPETITE CHANGE: 0
SINUS PRESSURE: 0
ABDOMINAL PAIN: 0
HEMATOLOGIC/LYMPHATIC NEGATIVE: 1
SPEECH DIFFICULTY: 0
NAUSEA: 0
MYALGIAS: 1

## 2024-08-23 ASSESSMENT — PAIN SCALES - GENERAL
PAINLEVEL_OUTOF10: 0 - NO PAIN
PAINLEVEL_OUTOF10: 7
PAINLEVEL_OUTOF10: 0 - NO PAIN
PAINLEVEL_OUTOF10: 5 - MODERATE PAIN
PAINLEVEL_OUTOF10: 0 - NO PAIN

## 2024-08-23 ASSESSMENT — PATIENT HEALTH QUESTIONNAIRE - PHQ9
2. FEELING DOWN, DEPRESSED OR HOPELESS: NOT AT ALL
1. LITTLE INTEREST OR PLEASURE IN DOING THINGS: NOT AT ALL
SUM OF ALL RESPONSES TO PHQ9 QUESTIONS 1 & 2: 0

## 2024-08-23 ASSESSMENT — ACTIVITIES OF DAILY LIVING (ADL)
LACK_OF_TRANSPORTATION: NO
DRESSING YOURSELF: INDEPENDENT
JUDGMENT_ADEQUATE_SAFELY_COMPLETE_DAILY_ACTIVITIES: YES
DRESSING YOURSELF: NEEDS ASSISTANCE
ADEQUATE_TO_COMPLETE_ADL: YES
HEARING - RIGHT EAR: FUNCTIONAL
WALKS IN HOME: NEEDS ASSISTANCE
PATIENT'S MEMORY ADEQUATE TO SAFELY COMPLETE DAILY ACTIVITIES?: YES
JUDGMENT_ADEQUATE_SAFELY_COMPLETE_DAILY_ACTIVITIES: YES
PATIENT'S MEMORY ADEQUATE TO SAFELY COMPLETE DAILY ACTIVITIES?: YES
FEEDING YOURSELF: NEEDS ASSISTANCE
GROOMING: NEEDS ASSISTANCE
GROOMING: INDEPENDENT
ASSISTIVE_DEVICE: WALKER
ADEQUATE_TO_COMPLETE_ADL: YES
FEEDING YOURSELF: INDEPENDENT
HEARING - LEFT EAR: FUNCTIONAL
TOILETING: NEEDS ASSISTANCE
BATHING: NEEDS ASSISTANCE
BATHING: INDEPENDENT

## 2024-08-23 ASSESSMENT — PAIN DESCRIPTION - ORIENTATION
ORIENTATION: RIGHT
ORIENTATION: RIGHT

## 2024-08-23 ASSESSMENT — COGNITIVE AND FUNCTIONAL STATUS - GENERAL
MOVING TO AND FROM BED TO CHAIR: A LITTLE
WALKING IN HOSPITAL ROOM: TOTAL
STANDING UP FROM CHAIR USING ARMS: TOTAL
EATING MEALS: A LITTLE
DAILY ACTIVITIY SCORE: 18
MOBILITY SCORE: 12
HELP NEEDED FOR BATHING: A LITTLE
HELP NEEDED FOR BATHING: A LITTLE
MOVING FROM LYING ON BACK TO SITTING ON SIDE OF FLAT BED WITH BEDRAILS: A LITTLE
DRESSING REGULAR UPPER BODY CLOTHING: A LITTLE
EATING MEALS: A LITTLE
MOVING TO AND FROM BED TO CHAIR: A LITTLE
TOILETING: A LITTLE
TOILETING: A LITTLE
DRESSING REGULAR LOWER BODY CLOTHING: A LITTLE
PERSONAL GROOMING: A LITTLE
TURNING FROM BACK TO SIDE WHILE IN FLAT BAD: A LITTLE
CLIMB 3 TO 5 STEPS WITH RAILING: TOTAL
WALKING IN HOSPITAL ROOM: TOTAL
TURNING FROM BACK TO SIDE WHILE IN FLAT BAD: A LITTLE
MOVING FROM LYING ON BACK TO SITTING ON SIDE OF FLAT BED WITH BEDRAILS: A LITTLE
MOBILITY SCORE: 12
PERSONAL GROOMING: A LITTLE
DRESSING REGULAR LOWER BODY CLOTHING: A LITTLE
PATIENT BASELINE BEDBOUND: NO
CLIMB 3 TO 5 STEPS WITH RAILING: TOTAL
STANDING UP FROM CHAIR USING ARMS: TOTAL
DRESSING REGULAR UPPER BODY CLOTHING: A LITTLE
DAILY ACTIVITIY SCORE: 18

## 2024-08-23 ASSESSMENT — LIFESTYLE VARIABLES
HOW OFTEN DO YOU HAVE 6 OR MORE DRINKS ON ONE OCCASION: NEVER
HOW MANY STANDARD DRINKS CONTAINING ALCOHOL DO YOU HAVE ON A TYPICAL DAY: PATIENT DOES NOT DRINK
SKIP TO QUESTIONS 9-10: 1
HOW OFTEN DO YOU HAVE A DRINK CONTAINING ALCOHOL: NEVER
AUDIT-C TOTAL SCORE: 0
AUDIT-C TOTAL SCORE: 0

## 2024-08-23 ASSESSMENT — PAIN DESCRIPTION - LOCATION
LOCATION: HIP
LOCATION: HIP

## 2024-08-23 ASSESSMENT — PAIN - FUNCTIONAL ASSESSMENT
PAIN_FUNCTIONAL_ASSESSMENT: 0-10

## 2024-08-23 ASSESSMENT — PAIN DESCRIPTION - PAIN TYPE: TYPE: ACUTE PAIN

## 2024-08-23 ASSESSMENT — PAIN DESCRIPTION - PROGRESSION: CLINICAL_PROGRESSION: NOT CHANGED

## 2024-08-23 NOTE — CARE PLAN
Problem: Skin  Goal: Decreased wound size/increased tissue granulation at next dressing change  Outcome: Progressing  Goal: Participates in plan/prevention/treatment measures  Outcome: Progressing  Goal: Prevent/manage excess moisture  Outcome: Progressing  Goal: Prevent/minimize sheer/friction injuries  Outcome: Progressing  Goal: Promote/optimize nutrition  Outcome: Progressing  Goal: Promote skin healing  Outcome: Progressing     Problem: Pain - Adult  Goal: Verbalizes/displays adequate comfort level or baseline comfort level  Outcome: Progressing     Problem: Safety - Adult  Goal: Free from fall injury  Outcome: Progressing     Problem: Discharge Planning  Goal: Discharge to home or other facility with appropriate resources  Outcome: Progressing     Problem: Chronic Conditions and Co-morbidities  Goal: Patient's chronic conditions and co-morbidity symptoms are monitored and maintained or improved  Outcome: Progressing     Problem: Pain  Goal: Takes deep breaths with improved pain control throughout the shift  Outcome: Progressing  Goal: Turns in bed with improved pain control throughout the shift  Outcome: Progressing  Goal: Walks with improved pain control throughout the shift  Outcome: Progressing  Goal: Performs ADL's with improved pain control throughout shift  Outcome: Progressing  Goal: Participates in PT with improved pain control throughout the shift  Outcome: Progressing  Goal: Free from opioid side effects throughout the shift  Outcome: Progressing  Goal: Free from acute confusion related to pain meds throughout the shift  Outcome: Progressing     Problem: Deep Vein Thrombosis  Goal: I will remain free from complications of deep vein thrombosis and maintain current level of mobility  Outcome: Progressing   The patient's goals for the shift include      The clinical goals for the shift include pain management, monitor labs and vs, maintain safety    Over the shift, the patient did not make progress  toward the following goals. Barriers to progression include pain, confusion. Recommendations to address these barriers include reorientation, medicate per MAR, maintain safety.

## 2024-08-23 NOTE — H&P
History Of Present Illness  Mariaelena Tavarez is a 89 y.o. female presenting with unwitnessed fall  Is 89 years old female with history of dementia hip surgery who lives alone at home who felt period of time not remembering how long.  Per patient her neighbor found her on the floor called her daughter who came and called EMS.  Patient had history of bilateral hip surgery, right hip dislocation tried at the ER but it was unsuccessfully.  Patient is a poor historian due to her dementia denies any pain just got sedation.  No sign of pain or distress patient answer to some questions still historian.  She was thinking that she is at home  Past Medical History  Past Medical History:   Diagnosis Date    Age-related nuclear cataract of left eye 09/03/2023    Anxiety 09/03/2023    Benign paroxysmal positional vertigo 09/03/2023    Coronary artery disease 09/03/2023    Gastroesophageal reflux disease 09/03/2023    Hip dislocation, right, initial encounter (Multi) 08/23/2024    Hyperlipidemia 09/03/2023    Osteoarthritis 09/03/2023    Unspecified cataract     Cataract of right eye, unspecified cataract type    Urinary incontinence 09/03/2023    Venous thrombosis 09/03/2023    Vitamin D deficiency 09/03/2023       Surgical History  Past Surgical History:   Procedure Laterality Date    CATARACT EXTRACTION  09/04/2018    Cataract Surgery    CT GUIDED IMAGING FOR NEEDLE PLACEMENT  9/27/2016    CT GUIDED IMAGING FOR NEEDLE PLACEMENT [facility]        Social History  She reports that she has never smoked. She has never used smokeless tobacco. She reports that she does not drink alcohol and does not use drugs.    Family History  Family History   Problem Relation Name Age of Onset    Hypertension Mother      Stroke Mother      Arthritis Father      Parkinsonism Sister      Dementia Sister      Cancer Maternal Grandfather      Heart disease Other [facility]     Hypertension Other [facility]     Stroke Other [facility]     Hypertension Sibling       Stroke Sibling          Allergies  Simvastatin, Iodinated contrast media, Lisinopril, and Sulfa (sulfonamide antibiotics)    Review of Systems   Constitutional:  Positive for activity change and fatigue. Negative for appetite change, diaphoresis, fever and unexpected weight change.   HENT:  Negative for congestion, rhinorrhea, sinus pressure and sore throat.    Eyes: Negative.    Respiratory:  Positive for wheezing. Negative for apnea, cough and shortness of breath.    Cardiovascular: Negative.  Negative for chest pain, palpitations and leg swelling.   Gastrointestinal:  Negative for abdominal distention, abdominal pain, blood in stool, constipation, diarrhea, nausea and vomiting.   Endocrine: Negative.    Genitourinary:  Negative for decreased urine volume and difficulty urinating.   Musculoskeletal:  Positive for gait problem, joint swelling and myalgias. Negative for neck pain and neck stiffness.        Right hip pain   Allergic/Immunologic: Negative.    Neurological:  Positive for weakness. Negative for dizziness, tremors, syncope, facial asymmetry, speech difficulty, light-headedness, numbness and headaches.   Hematological: Negative.    Psychiatric/Behavioral: Negative.          Physical Exam  Constitutional:       Appearance: Normal appearance.   HENT:      Head: Normocephalic and atraumatic.      Nose: Nose normal.   Eyes:      Extraocular Movements: Extraocular movements intact.      Pupils: Pupils are equal, round, and reactive to light.   Cardiovascular:      Rate and Rhythm: Normal rate.      Pulses: Normal pulses.   Pulmonary:      Effort: Pulmonary effort is normal.      Breath sounds: Normal breath sounds.   Abdominal:      General: Bowel sounds are normal.      Palpations: Abdomen is soft.   Musculoskeletal:         General: Tenderness present.      Cervical back: Normal range of motion and neck supple.      Right lower leg: No edema.      Left lower leg: No edema.      Comments: Right hip tenderness     Skin:     General: Skin is warm.   Neurological:      General: No focal deficit present.      Mental Status: She is alert. Mental status is at baseline.          Last Recorded Vitals  Blood pressure 126/57, pulse 74, temperature 36.6 °C (97.9 °F), temperature source Tympanic, resp. rate 16, height 1.524 m (5'), weight (!) 39.5 kg (87 lb), SpO2 100%.    Relevant Results        Results for orders placed or performed during the hospital encounter of 08/23/24 (from the past 24 hour(s))   CBC and Auto Differential   Result Value Ref Range    WBC 12.1 (H) 4.4 - 11.3 x10*3/uL    nRBC 0.0 0.0 - 0.0 /100 WBCs    RBC 4.83 4.00 - 5.20 x10*6/uL    Hemoglobin 15.2 12.0 - 16.0 g/dL    Hematocrit 43.7 36.0 - 46.0 %    MCV 91 80 - 100 fL    MCH 31.5 26.0 - 34.0 pg    MCHC 34.8 32.0 - 36.0 g/dL    RDW 12.3 11.5 - 14.5 %    Platelets 216 150 - 450 x10*3/uL    Neutrophils % 82.6 40.0 - 80.0 %    Immature Granulocytes %, Automated 0.3 0.0 - 0.9 %    Lymphocytes % 8.3 13.0 - 44.0 %    Monocytes % 8.7 2.0 - 10.0 %    Eosinophils % 0.0 0.0 - 6.0 %    Basophils % 0.1 0.0 - 2.0 %    Neutrophils Absolute 9.98 (H) 1.60 - 5.50 x10*3/uL    Immature Granulocytes Absolute, Automated 0.04 0.00 - 0.50 x10*3/uL    Lymphocytes Absolute 1.00 0.80 - 3.00 x10*3/uL    Monocytes Absolute 1.05 (H) 0.05 - 0.80 x10*3/uL    Eosinophils Absolute 0.00 0.00 - 0.40 x10*3/uL    Basophils Absolute 0.01 0.00 - 0.10 x10*3/uL   Phosphorus   Result Value Ref Range    Phosphorus 3.2 2.5 - 4.5 mg/dL   Magnesium   Result Value Ref Range    Magnesium 2.30 1.60 - 3.10 mg/dL   Comprehensive metabolic panel   Result Value Ref Range    Glucose 105 (H) 65 - 99 mg/dL    Sodium 130 (L) 133 - 145 mmol/L    Potassium 3.3 (L) 3.4 - 5.1 mmol/L    Chloride 91 (L) 97 - 107 mmol/L    Bicarbonate 27 24 - 31 mmol/L    Urea Nitrogen      Creatinine 0.60 0.40 - 1.60 mg/dL    eGFR 86 >60 mL/min/1.73m*2    Calcium 10.1 8.5 - 10.4 mg/dL    Albumin 4.4 3.5 - 5.0 g/dL    Alkaline Phosphatase  87 35 - 125 U/L    Total Protein 7.4 5.9 - 7.9 g/dL    AST 95 (H) 5 - 40 U/L    Bilirubin, Total 2.4 (H) 0.1 - 1.2 mg/dL    ALT 45 (H) 5 - 40 U/L    Anion Gap 12 <=19 mmol/L   Cardiac Enzymes - CPK   Result Value Ref Range    Creatine Kinase 3,541 (H) 24 - 195 U/L   ECG 12 lead   Result Value Ref Range    Ventricular Rate 80 BPM    Atrial Rate 78 BPM    NV Interval 140 ms    QRS Duration 128 ms    QT Interval 502 ms    QTC Calculation(Bazett) 578 ms    P Axis 110 degrees    R Axis 66 degrees    T Axis 39 degrees    QRS Count 13 beats    Q Onset 211 ms    P Onset 141 ms    P Offset 164 ms    T Offset 462 ms    QTC Fredericia 553 ms     ECG 12 lead    Result Date: 8/23/2024   Poor data quality, interpretation may be adversely affected Undetermined rhythm Nonspecific intraventricular block Cannot rule out Inferior infarct , age undetermined Abnormal ECG When compared with ECG of 01-JAN-2024 15:00, Current undetermined rhythm precludes rhythm comparison, needs review Questionable change in QRS duration    CT cervical spine wo IV contrast    Result Date: 8/23/2024  Interpreted By:  Guy Maldonado, STUDY: CT CERVICAL SPINE WO IV CONTRAST; 8/23/2024 1:12 pm   INDICATION: Signs/Symptoms:fall.   COMPARISON: None.   ACCESSION NUMBER(S): [id number]   ORDERING CLINICIAN: MORIAH ALEGRE   TECHNIQUE: Contiguous axial CT images were obtained at  2 mm slice thickness through the cervical spine without contrast administration. The images were then reconstructed in the coronal and sagittal planes.   FINDINGS: There is no CT evidence of acute fracture identified. No prevertebral soft tissue swelling is identified.   ALIGNMENT: There is mild anterolisthesis of C3 on C4 and minimal anterolisthesis of C2 on C3 and of C7 on T1.   VERTEBRAE/DISC SPACES: Moderate to severe disc space narrowing and marginal osteophyte formation is seen at the C5-6 and C6-7 levels. Mild-to-moderate discogenic degenerative changes are seen at the C4-5 level.    ADDITIONAL FINDINGS: Evaluation of the visualized soft tissues of the neck is limited by the lack of intravenous contrast.  Within this limitation, no gross mass or lymphadenopathy is identified.       1.  No CT evidence of acute fracture. 2.  Degenerative changes throughout the cervical spine, as above.   Signed by: Guy Maldonado 8/23/2024 1:42 PM Dictation workstation:   [id number]    CT head wo IV contrast    Result Date: 8/23/2024  Interpreted By:  Guy Maldonado, STUDY: CT HEAD WO IV CONTRAST; 8/23/2024 1:12 pm   INDICATION: Signs/Symptoms:fall.   COMPARISON: CT head dated 01/01/2024   ACCESSION NUMBER(S): [id number]   ORDERING CLINICIAN: MORIAH ALEGRE   TECHNIQUE: Contiguous axial CT images were obtained through the head at 5 mm slice thickness without contrast administration.   FINDINGS: INTRACRANIAL: Mild diffuse volume loss is seen bilaterally. Mild periventricular white matter changes are seen.  The grey-white differentiation is intact. There is no mass effect or midline shift. There is no extraaxial fluid collection. There is no intracranial hemorrhage. The calvarium  is unremarkable.   EXTRACRANIAL: Visualized paranasal sinuses and mastoids are clear.       1. Diffuse volume loss and periventricular white matter changes, most consistent with small vessel ischemic disease. 2. No evidence of acute cortical infarct or intracranial hemorrhage.     MACRO: None   Signed by: Guy Maldonado 8/23/2024 1:39 PM Dictation workstation:   [id number]    XR pelvis 1-2 views    Result Date: 8/23/2024  Interpreted By:  Guy Maldonado, STUDY: XR PELVIS 1-2 VIEWS 8/23/2024 12:58 pm   INDICATION: Signs/Symptoms:fall, hip pain   COMPARISON: 07/29/2020   ACCESSION NUMBER(S): [id number]   ORDERING CLINICIAN: MORIAH ALEGRE   TECHNIQUE: A single AP view of the pelvis was obtained.   FINDINGS: The patient is status post bilateral total hip arthroplasty. There is lateral dislocation of the right hip. There is no evidence of acute  fracture identified. Moderate hypertrophic degenerative changes are seen in the sacroiliac joints bilaterally. Rounded calcifications are seen with throughout the pelvis, most consistent with phleboliths.       1. Postoperative changes status post bilateral total hip arthroplasty. 2. Right hip dislocation, as above. 3. No acute fracture. 4. Degenerative changes, as described above.   MACRO: None.     Signed by: Guy Maldonado 8/23/2024 1:31 PM Dictation workstation:   [id number]    XR femur right 2+ views    Result Date: 8/23/2024  Interpreted By:  Alma Rosa Garcia, STUDY: Right femur, two views. Pelvis, one view.   INDICATION: Signs/Symptoms:fall, hip pain.   COMPARISON: 07/29/2020.   ACCESSION NUMBER(S): [id number]   ORDERING CLINICIAN: MORIAH ALEGRE   FINDINGS: Status post right hip arthroplasty with superior dislocation of the femur with respect to the acetabular cup.   No acute fracture appreciated. Advanced lower lumbar spine degenerative changes at L4-5 and L5-S1. Partially visualized left hip arthroplasty changes with intact appearance of the hardware in the visualized aspect.       1. Periprosthetic right hip dislocation. No acute fracture.   MACRO:   None.   Signed by: Alma Rosa Garcia 8/23/2024 1:08 PM Dictation workstation:   [id number]            Assessment/Plan   Patient admitted for evaluation of fall, found out that has rhabdomyolysis and right hip prosthetic dislocation . Patient had history of bilateral hip surgery.  Surgeon was consulted.  N.p.o. after midnight.  DC to SNF  Assessment & Plan  Fall, initial encounter  Patient felt did not remember  PT OT  Fall precautions  Memory loss  Stable dementia with no behavioral  Coronary artery disease  Denies any chest pain or shortness of breath on 2 L just for comfort with SpO2 100%  Hypertension  BP is under control  Hypothyroid    Hip dislocation, right, initial encounter (Multi)    Rhabdomyolysis  CK 3541 at er   Start IV fluids   CK in am   Call  patient Burke Bishophector regarding her meds list and I left a message  Talk with the nurse if any relative of her came to let me know   Update meds        I spent 65 minutes in the professional and overall care of this patient.      Zeynep Santos, APRN-CNP

## 2024-08-23 NOTE — SIGNIFICANT EVENT
Respiratory was present in the room during conscious sedation.  Spo2 99% on 2lpm Etco2 30.  No issue from respiratory stand point during procedure.

## 2024-08-23 NOTE — ED PROVIDER NOTES
HPI   No chief complaint on file.      Patient is an 89-year-old female who presents with a chief complaint of a fall.  Patient is brought in by EMS from home, she fell, she is unsure of how long she was on the floor for.  Patient is alert and oriented x 4, she states that she is not able to move, and is having pain in her right hip.  Both of these hips are artificial.  Patient states she also has a new onset tremor, patient denies any headache, denies neck pain, denies any fevers or chills, denies any dizziness or blurred vision, denies any numbness or tingling or extremity.      History provided by:  Patient, relative and EMS personnel          Patient History   Past Medical History:   Diagnosis Date    Unspecified cataract     Cataract of right eye, unspecified cataract type     Past Surgical History:   Procedure Laterality Date    CATARACT EXTRACTION  09/04/2018    Cataract Surgery    CT GUIDED IMAGING FOR NEEDLE PLACEMENT  9/27/2016    CT GUIDED IMAGING FOR NEEDLE PLACEMENT [facility]     Family History   Problem Relation Name Age of Onset    Hypertension Mother      Stroke Mother      Arthritis Father      Parkinsonism Sister      Dementia Sister      Cancer Maternal Grandfather      Heart disease Other [facility]     Hypertension Other [facility]     Stroke Other [facility]     Hypertension Sibling      Stroke Sibling       Social History     Tobacco Use    Smoking status: Never    Smokeless tobacco: Never   Substance Use Topics    Alcohol use: Never    Drug use: Never       Physical Exam   ED Triage Vitals   Temp Pulse Resp BP   -- -- -- --      SpO2 Temp src Heart Rate Source Patient Position   -- -- -- --      BP Location FiO2 (%)     -- --       Physical Exam  Vitals and nursing note reviewed. Exam conducted with a chaperone present.   Constitutional:       General: She is not in acute distress.     Appearance: Normal appearance. She is normal weight. She is not ill-appearing, toxic-appearing or diaphoretic.   HENT:       Head: Normocephalic and atraumatic.      Right Ear: Tympanic membrane, ear canal and external ear normal.      Left Ear: Tympanic membrane, ear canal and external ear normal.      Nose: Nose normal.      Mouth/Throat:      Mouth: Mucous membranes are moist.      Pharynx: Oropharynx is clear.   Eyes:      Extraocular Movements: Extraocular movements intact.      Conjunctiva/sclera: Conjunctivae normal.      Pupils: Pupils are equal, round, and reactive to light.   Cardiovascular:      Rate and Rhythm: Normal rate and regular rhythm.      Pulses: Normal pulses.      Heart sounds: Normal heart sounds.   Pulmonary:      Effort: Pulmonary effort is normal.      Breath sounds: Normal breath sounds.   Abdominal:      General: Abdomen is flat. Bowel sounds are normal.      Palpations: Abdomen is soft.   Musculoskeletal:         General: Deformity and signs of injury present.      Cervical back: Normal range of motion and neck supple.      Right hip: Deformity present. Decreased range of motion.      Left hip: Normal.   Skin:     General: Skin is warm and dry.      Capillary Refill: Capillary refill takes less than 2 seconds.   Neurological:      General: No focal deficit present.      Mental Status: She is alert and oriented to person, place, and time. Mental status is at baseline.   Psychiatric:         Mood and Affect: Mood normal.         Behavior: Behavior normal.         Thought Content: Thought content normal.         Judgment: Judgment normal.           ED Course & MDM   ED Course as of 08/24/24 0632   Fri Aug 23, 2024   1355 EKG interpreted by myself independently, EKG shows what I believe is normal sinus rhythm, however due to motion artifact secondary to tremor, very hard to interpret, I do not appreciate any significant ST elevations or depressions, rate of 80 bpm, parable 140, , , QTc 528, no ST elevations or depressions, negative for acute MI [ALBA]      ED Course User Index  [ALBA] Terrance Busby,  DO         Diagnoses as of 08/24/24 0632   Fall, initial encounter   Hip dislocation, right, initial encounter (Multi)   Traumatic rhabdomyolysis, initial encounter (CMS-HCC)                 No data recorded                                 Medical Decision Making  Patient seen and evaluated at bedside, patient is in no acute distress.  I will order a CT head, CT cervical spine, x-ray pelvis, x-ray femur, x-ray chest, CBC, CMP, CPK, urinalysis, EKG. Differential diagnosis includes but is not limited to hip fracture, hip dislocation, intracranial hemorrhage, rhabdomyolysis..  Patient CBC shows leukocytosis of 12.1, patient CPK 3541, patient's AST and ALT 95 and 45, sodium 130, potassium 3.3, urinalysis shows ketones, protein, patient given 2 L normal saline, patient's imaging results show that the patient had a prosthetic dislocation of the right side.  Please see procedure note.  Unfortunately unable to get the prosthetic back in place.  Orthopedics will be consulted, will see the patient in consultation, patient will be admitted to the general medicine team for rhabdomyolysis, hip dislocation.    Diagnosis: fall, hip dislocation, rhabdomyolysis  XR elbow 3+ views bilateral   Final Result    Normal radiographs left elbow. Normal radiographs right elbow.          Signed by: Luis Nicolas 8/23/2024 5:30 PM    Dictation workstation:   [id number]     CT head wo IV contrast   Final Result    1. Diffuse volume loss and periventricular white matter changes, most    consistent with small vessel ischemic disease.    2. No evidence of acute cortical infarct or intracranial hemorrhage.                MACRO:    None          Signed by: Guy Maldonado 8/23/2024 1:39 PM    Dictation workstation:   [id number]     CT cervical spine wo IV contrast   Final Result    1.  No CT evidence of acute fracture.    2.  Degenerative changes throughout the cervical spine, as above.          Signed by: Guy Maldonado 8/23/2024 1:42 PM    Dictation  workstation:   [id number]     XR pelvis 1-2 views   Final Result    1. Postoperative changes status post bilateral total hip arthroplasty.    2. Right hip dislocation, as above.    3. No acute fracture.    4. Degenerative changes, as described above.          MACRO:    None.                Signed by: Guy Maldonado 8/23/2024 1:31 PM    Dictation workstation:   [id number]     XR femur right 2+ views   Final Result    1. Periprosthetic right hip dislocation. No acute fracture.          MACRO:          None.          Signed by: Alma Rosa Garcia 8/23/2024 1:08 PM    Dictation workstation:   [id number]     FL less than 1 hour    (Results Pending)  Results for orders placed or performed during the hospital encounter of 08/23/24  -CBC and Auto Differential:        Result                      Value             Ref Range           WBC                         12.1 (H)          4.4 - 11.3 x*       nRBC                        0.0               0.0 - 0.0 /1*       RBC                         4.83              4.00 - 5.20 *       Hemoglobin                  15.2              12.0 - 16.0 *       Hematocrit                  43.7              36.0 - 46.0 %       MCV                         91                80 - 100 fL         MCH                         31.5              26.0 - 34.0 *       MCHC                        34.8              32.0 - 36.0 *       RDW                         12.3              11.5 - 14.5 %       Platelets                   216               150 - 450 x1*       Neutrophils %               82.6              40.0 - 80.0 %       Immature Granulocytes *     0.3               0.0 - 0.9 %         Lymphocytes %               8.3               13.0 - 44.0 %       Monocytes %                 8.7               2.0 - 10.0 %        Eosinophils %               0.0               0.0 - 6.0 %         Basophils %                 0.1               0.0 - 2.0 %         Neutrophils Absolute        9.98 (H)          1.60 -  5.50 *       Immature Granulocytes *     0.04              0.00 - 0.50 *       Lymphocytes Absolute        1.00              0.80 - 3.00 *       Monocytes Absolute          1.05 (H)          0.05 - 0.80 *       Eosinophils Absolute        0.00              0.00 - 0.40 *       Basophils Absolute          0.01              0.00 - 0.10 *  -Phosphorus:        Result                      Value             Ref Range           Phosphorus                  3.2               2.5 - 4.5 mg*  -Magnesium:        Result                      Value             Ref Range           Magnesium                   2.30              1.60 - 3.10 *  -Comprehensive metabolic panel:        Result                      Value             Ref Range           Glucose                     109 (H)           65 - 99 mg/dL       Sodium                      130 (L)           133 - 145 mm*       Potassium                   3.3 (L)           3.4 - 5.1 mm*       Chloride                    91 (L)            97 - 107 mmo*       Bicarbonate                 27                24 - 31 mmol*       Urea Nitrogen               19                8 - 25 mg/dL        Creatinine                  0.60              0.40 - 1.60 *       eGFR                        86                >60 mL/min/1*       Calcium                     10.1              8.5 - 10.4 m*       Albumin                     4.4               3.5 - 5.0 g/*       Alkaline Phosphatase        87                35 - 125 U/L        Total Protein               7.4               5.9 - 7.9 g/*       AST                         95 (H)            5 - 40 U/L          Bilirubin, Total            2.4 (H)           0.1 - 1.2 mg*       ALT                         45 (H)            5 - 40 U/L          Anion Gap                   12                <=19 mmol/L    -Cardiac Enzymes - CPK:        Result                      Value             Ref Range           Creatine Kinase             3,541 (H)         24 - 195 U/L    -Urinalysis with Reflex Culture and Microscopic:        Result                      Value             Ref Range           Color, Urine                Light-Yellow      Light-Yellow*       Appearance, Urine           Clear             Clear               Specific Gravity, Urine     1.015             1.005 - 1.035       pH, Urine                   6.0               5.0, 5.5, 6.*       Protein, Urine              50 (1+) (A)       NEGATIVE, 10*       Glucose, Urine              Normal            Normal mg/dL        Blood, Urine                0.5 (2+) (A)      NEGATIVE            Ketones, Urine              40 (2+) (A)       NEGATIVE mg/*       Bilirubin, Urine            NEGATIVE          NEGATIVE            Urobilinogen, Urine         Normal            Normal mg/dL        Nitrite, Urine              NEGATIVE          NEGATIVE            Leukocyte Esterase, Ur*     NEGATIVE          NEGATIVE       -Comprehensive metabolic panel:        Result                      Value             Ref Range           Glucose                     161 (H)           65 - 99 mg/dL       Sodium                      132 (L)           133 - 145 mm*       Potassium                   3.3 (L)           3.4 - 5.1 mm*       Chloride                    98                97 - 107 mmo*       Bicarbonate                 21 (L)            24 - 31 mmol*       Urea Nitrogen               15                8 - 25 mg/dL        Creatinine                  0.50              0.40 - 1.60 *       eGFR                        90                >60 mL/min/1*       Calcium                     8.7               8.5 - 10.4 m*       Albumin                     3.7               3.5 - 5.0 g/*       Alkaline Phosphatase        69                35 - 125 U/L        Total Protein               6.3               5.9 - 7.9 g/*       AST                         87 (H)            5 - 40 U/L          Bilirubin, Total            1.8 (H)           0.1 - 1.2 mg*       ALT                          41 (H)            5 - 40 U/L          Anion Gap                   13                <=19 mmol/L    -CBC:        Result                      Value             Ref Range           WBC                         10.0              4.4 - 11.3 x*       nRBC                        0.0               0.0 - 0.0 /1*       RBC                         3.93 (L)          4.00 - 5.20 *       Hemoglobin                  12.2              12.0 - 16.0 *       Hematocrit                  37.2              36.0 - 46.0 %       MCV                         95                80 - 100 fL         MCH                         31.0              26.0 - 34.0 *       MCHC                        32.8              32.0 - 36.0 *       RDW                         12.8              11.5 - 14.5 %       Platelets                   187               150 - 450 x1*  -Creatine Kinase:        Result                      Value             Ref Range           Creatine Kinase             2,353 (H)         24 - 195 U/L   -ECG 12 lead:        Result                      Value             Ref Range           Ventricular Rate            80                BPM                 Atrial Rate                 78                BPM                 IL Interval                 140               ms                  QRS Duration                128               ms                  QT Interval                 502               ms                  QTC Calculation(Bazett)     578               ms                  P Axis                      110               degrees             R Axis                      66                degrees             T Axis                      39                degrees             QRS Count                   13                beats               Q Onset                     211               ms                  P Onset                     141               ms                  P Offset                    164               ms                  T  Offset                    462               ms                  QTC Fredericia              553               ms             -Urinalysis Microscopic:        Result                      Value             Ref Range           WBC, Urine                  1-5               1-5, NONE /H*       RBC, Urine                  3-5               NONE, 1-2, 3*       Mucus, Urine                FEW               Reference ra*          Procedure  Orthopaedic Injury Treatment - Lower Extremity    Performed by: Terrance Busby DO  Authorized by: Terrance Busby DO    Consent:     Consent obtained:  Written and verbal    Consent given by:  Patient and healthcare agent    Risks, benefits, and alternatives were discussed: yes      Risks discussed:  Irreducible dislocation and recurrent dislocation    Alternatives discussed:  No treatment, delayed treatment and immobilization  Universal protocol:     Procedure explained and questions answered to patient or proxy's satisfaction: yes      Relevant documents present and verified: yes      Imaging studies available: yes      Immediately prior to procedure, a time out was called: yes      Patient identity confirmed:  Verbally with patient and arm band  Location:     Location:  Hip    Hip injury location:  R hip    Hip dislocation type: obturator      Etiology: traumatic      Prosthesis: yes    Pre-procedure details:     Distal perfusion: normal      Range of motion: reduced    Sedation:     Sedation type:  Moderate sedation  Anesthesia:     Anesthesia method:  None  Procedure details:     Manipulation performed: yes      Hip reduction method:  Direct traction, abduction and external rotation    Reduction successful: no    Post-procedure details:     Neurological function: normal      Distal perfusion: normal      Range of motion: unchanged      Procedure completion:  Tolerated well, no immediate complications  Moderate Sedation    Performed by: Terrance Busby DO  Authorized by: Terrance Busby DO     Consent:     Consent obtained:  Verbal and written    Consent given by:  Patient and healthcare agent    Risks, benefits, and alternatives were discussed: yes      Risks discussed:  Inadequate sedation, respiratory compromise necessitating ventilatory assistance and intubation, prolonged hypoxia resulting in organ damage and prolonged sedation necessitating reversal    Alternatives discussed:  Analgesia without sedation and anxiolysis  Universal protocol:     Procedure explained and questions answered to patient or proxy's satisfaction: yes      Relevant documents present and verified: yes      Imaging studies available: yes      Immediately prior to procedure, a time out was called: yes      Patient identity confirmed:  Arm band and verbally with patient  Indications:     Procedure performed:  Dislocation reduction    Procedure necessitating sedation performed by:  Physician performing sedation    Intended level of sedation:  Moderate  Pre-sedation assessment:     Time since last food or drink:  Unknown    NPO status caution: unable to specify NPO status      ASA classification: class 2 - patient with mild systemic disease      Mouth opening:  3 or more finger widths    Thyromental distance:  4 finger widths    Mallampati score:  I - soft palate, uvula, fauces, pillars visible    Neck mobility: normal      Pre-sedation assessments completed and reviewed: airway patency, anesthesia/sedation history, cardiovascular function, hydration status, mental status, nausea/vomiting, pain level, respiratory function and temperature      History of difficult intubation: no      Pre-sedation assessment completed:  8/23/2024 2:01 PM  Immediate pre-procedure details:     Reassessment: Patient reassessed immediately prior to procedure      Reviewed: vital signs, relevant labs/tests and NPO status      Verified: bag valve mask available, emergency equipment available, intubation equipment available, IV patency confirmed and oxygen  available    Procedure details (see MAR for exact dosages):     Sedation start time:  8/23/2024 2:31 PM    Preoxygenation:  Room air and nasal cannula    Sedation:  Ketamine (ketofol, 1:1)    Analgesia:  Fentanyl    Intra-procedure monitoring:  Blood pressure monitoring, continuous capnometry, continuous pulse oximetry, frequent vital sign checks, frequent LOC assessments and cardiac monitor    Intra-procedure events: none      Intra-procedure management:  Airway repositioning    Sedation end time:  8/23/2024 3:00 PM    Total sedation time (minutes):  30  Post-procedure details:     Post-sedation assessment completed:  8/23/2024 3:15 PM    Attendance: Constant attendance by certified staff until patient recovered      Recovery: Patient returned to pre-procedure baseline      Post-sedation assessments completed and reviewed: airway patency, cardiovascular function, hydration status, mental status, nausea/vomiting, pain level, respiratory function and temperature      Specimens recovered:  None    Patient is stable for discharge or admission: yes      Procedure completion:  Tolerated well, no immediate complications    Sections of this report were created using voice-to-text technology and may contain errors in translation    Terrance Busby DO  Emergency Medicine         Terrance Busby DO  08/24/24 0644

## 2024-08-24 ENCOUNTER — ANESTHESIA (OUTPATIENT)
Dept: OPERATING ROOM | Facility: HOSPITAL | Age: 89
End: 2024-08-24
Payer: MEDICARE

## 2024-08-24 ENCOUNTER — APPOINTMENT (OUTPATIENT)
Dept: RADIOLOGY | Facility: HOSPITAL | Age: 89
DRG: 560 | End: 2024-08-24
Payer: MEDICARE

## 2024-08-24 ENCOUNTER — ANESTHESIA EVENT (OUTPATIENT)
Dept: OPERATING ROOM | Facility: HOSPITAL | Age: 89
End: 2024-08-24
Payer: MEDICARE

## 2024-08-24 PROBLEM — H26.9 CATARACT PRESENT: Status: ACTIVE | Noted: 2024-08-24

## 2024-08-24 PROBLEM — F03.90 DEMENTIA (MULTI): Status: ACTIVE | Noted: 2024-08-24

## 2024-08-24 LAB
ATRIAL RATE: 78 BPM
CK SERPL-CCNC: 2353 U/L (ref 24–195)
ERYTHROCYTE [DISTWIDTH] IN BLOOD BY AUTOMATED COUNT: 12.8 % (ref 11.5–14.5)
HCT VFR BLD AUTO: 37.2 % (ref 36–46)
HGB BLD-MCNC: 12.2 G/DL (ref 12–16)
MCH RBC QN AUTO: 31 PG (ref 26–34)
MCHC RBC AUTO-ENTMCNC: 32.8 G/DL (ref 32–36)
MCV RBC AUTO: 95 FL (ref 80–100)
NRBC BLD-RTO: 0 /100 WBCS (ref 0–0)
P AXIS: 110 DEGREES
P OFFSET: 164 MS
P ONSET: 141 MS
PLATELET # BLD AUTO: 187 X10*3/UL (ref 150–450)
PR INTERVAL: 140 MS
Q ONSET: 211 MS
QRS COUNT: 13 BEATS
QRS DURATION: 128 MS
QT INTERVAL: 502 MS
QTC CALCULATION(BAZETT): 578 MS
QTC FREDERICIA: 553 MS
R AXIS: 66 DEGREES
RBC # BLD AUTO: 3.93 X10*6/UL (ref 4–5.2)
T AXIS: 39 DEGREES
T OFFSET: 462 MS
VENTRICULAR RATE: 80 BPM
WBC # BLD AUTO: 10 X10*3/UL (ref 4.4–11.3)

## 2024-08-24 PROCEDURE — 3700000002 HC GENERAL ANESTHESIA TIME - EACH INCREMENTAL 1 MINUTE: Performed by: ORTHOPAEDIC SURGERY

## 2024-08-24 PROCEDURE — 3600000002 HC OR TIME - INITIAL BASE CHARGE - PROCEDURE LEVEL TWO: Performed by: ORTHOPAEDIC SURGERY

## 2024-08-24 PROCEDURE — 2500000004 HC RX 250 GENERAL PHARMACY W/ HCPCS (ALT 636 FOR OP/ED): Performed by: ORTHOPAEDIC SURGERY

## 2024-08-24 PROCEDURE — 2500000004 HC RX 250 GENERAL PHARMACY W/ HCPCS (ALT 636 FOR OP/ED): Performed by: ANESTHESIOLOGIST ASSISTANT

## 2024-08-24 PROCEDURE — 85027 COMPLETE CBC AUTOMATED: CPT | Performed by: NURSE PRACTITIONER

## 2024-08-24 PROCEDURE — 2500000001 HC RX 250 WO HCPCS SELF ADMINISTERED DRUGS (ALT 637 FOR MEDICARE OP): Performed by: ORTHOPAEDIC SURGERY

## 2024-08-24 PROCEDURE — 3600000007 HC OR TIME - EACH INCREMENTAL 1 MINUTE - PROCEDURE LEVEL TWO: Performed by: ORTHOPAEDIC SURGERY

## 2024-08-24 PROCEDURE — 7100000001 HC RECOVERY ROOM TIME - INITIAL BASE CHARGE: Performed by: ORTHOPAEDIC SURGERY

## 2024-08-24 PROCEDURE — 7100000002 HC RECOVERY ROOM TIME - EACH INCREMENTAL 1 MINUTE: Performed by: ORTHOPAEDIC SURGERY

## 2024-08-24 PROCEDURE — 2500000004 HC RX 250 GENERAL PHARMACY W/ HCPCS (ALT 636 FOR OP/ED): Performed by: NURSE PRACTITIONER

## 2024-08-24 PROCEDURE — 36415 COLL VENOUS BLD VENIPUNCTURE: CPT | Performed by: NURSE PRACTITIONER

## 2024-08-24 PROCEDURE — 82550 ASSAY OF CK (CPK): CPT | Performed by: NURSE PRACTITIONER

## 2024-08-24 PROCEDURE — 3700000001 HC GENERAL ANESTHESIA TIME - INITIAL BASE CHARGE: Performed by: ORTHOPAEDIC SURGERY

## 2024-08-24 PROCEDURE — 2500000004 HC RX 250 GENERAL PHARMACY W/ HCPCS (ALT 636 FOR OP/ED): Performed by: ANESTHESIOLOGY

## 2024-08-24 PROCEDURE — 99232 SBSQ HOSP IP/OBS MODERATE 35: CPT | Performed by: NURSE PRACTITIONER

## 2024-08-24 PROCEDURE — 1200000002 HC GENERAL ROOM WITH TELEMETRY DAILY

## 2024-08-24 PROCEDURE — 0QSBXZZ REPOSITION RIGHT LOWER FEMUR, EXTERNAL APPROACH: ICD-10-PCS | Performed by: ORTHOPAEDIC SURGERY

## 2024-08-24 RX ORDER — PROPOFOL 10 MG/ML
INJECTION, EMULSION INTRAVENOUS AS NEEDED
Status: DISCONTINUED | OUTPATIENT
Start: 2024-08-24 | End: 2024-08-24

## 2024-08-24 RX ORDER — GLYCOPYRROLATE 0.2 MG/ML
INJECTION INTRAMUSCULAR; INTRAVENOUS AS NEEDED
Status: DISCONTINUED | OUTPATIENT
Start: 2024-08-24 | End: 2024-08-24

## 2024-08-24 RX ORDER — FENTANYL CITRATE 50 UG/ML
25 INJECTION, SOLUTION INTRAMUSCULAR; INTRAVENOUS EVERY 5 MIN PRN
Status: DISCONTINUED | OUTPATIENT
Start: 2024-08-24 | End: 2024-08-24 | Stop reason: HOSPADM

## 2024-08-24 RX ORDER — FENTANYL CITRATE 50 UG/ML
INJECTION, SOLUTION INTRAMUSCULAR; INTRAVENOUS AS NEEDED
Status: DISCONTINUED | OUTPATIENT
Start: 2024-08-24 | End: 2024-08-24

## 2024-08-24 RX ORDER — PHENYLEPHRINE HYDROCHLORIDE 10 MG/ML
INJECTION INTRAVENOUS AS NEEDED
Status: DISCONTINUED | OUTPATIENT
Start: 2024-08-24 | End: 2024-08-24

## 2024-08-24 RX ORDER — SODIUM CHLORIDE, SODIUM LACTATE, POTASSIUM CHLORIDE, CALCIUM CHLORIDE 600; 310; 30; 20 MG/100ML; MG/100ML; MG/100ML; MG/100ML
100 INJECTION, SOLUTION INTRAVENOUS CONTINUOUS
Status: DISCONTINUED | OUTPATIENT
Start: 2024-08-24 | End: 2024-08-24 | Stop reason: HOSPADM

## 2024-08-24 RX ORDER — SODIUM CHLORIDE 9 MG/ML
100 INJECTION, SOLUTION INTRAVENOUS CONTINUOUS
Status: DISCONTINUED | OUTPATIENT
Start: 2024-08-24 | End: 2024-08-26

## 2024-08-24 RX ORDER — SUCCINYLCHOLINE CHLORIDE 20 MG/ML
INJECTION INTRAMUSCULAR; INTRAVENOUS AS NEEDED
Status: DISCONTINUED | OUTPATIENT
Start: 2024-08-24 | End: 2024-08-24

## 2024-08-24 RX ORDER — FENTANYL CITRATE 50 UG/ML
50 INJECTION, SOLUTION INTRAMUSCULAR; INTRAVENOUS EVERY 5 MIN PRN
Status: DISCONTINUED | OUTPATIENT
Start: 2024-08-24 | End: 2024-08-24 | Stop reason: HOSPADM

## 2024-08-24 RX ADMIN — AMLODIPINE BESYLATE 10 MG: 10 TABLET ORAL at 12:00

## 2024-08-24 RX ADMIN — GABAPENTIN 100 MG: 100 CAPSULE ORAL at 12:00

## 2024-08-24 RX ADMIN — GABAPENTIN 200 MG: 100 CAPSULE ORAL at 20:47

## 2024-08-24 RX ADMIN — SODIUM CHLORIDE 100 ML/HR: 900 INJECTION, SOLUTION INTRAVENOUS at 15:39

## 2024-08-24 RX ADMIN — TRAMADOL HYDROCHLORIDE 50 MG: 50 TABLET ORAL at 12:09

## 2024-08-24 RX ADMIN — DONEPEZIL HYDROCHLORIDE 10 MG: 10 TABLET, FILM COATED ORAL at 20:47

## 2024-08-24 RX ADMIN — SODIUM CHLORIDE 100 ML/HR: 900 INJECTION, SOLUTION INTRAVENOUS at 23:26

## 2024-08-24 RX ADMIN — ATENOLOL 25 MG: 25 TABLET ORAL at 12:03

## 2024-08-24 RX ADMIN — CYANOCOBALAMIN TAB 1000 MCG 1000 MCG: 1000 TAB at 12:00

## 2024-08-24 RX ADMIN — ESCITALOPRAM OXALATE 5 MG: 10 TABLET ORAL at 12:00

## 2024-08-24 RX ADMIN — PANTOPRAZOLE SODIUM 40 MG: 40 INJECTION, POWDER, FOR SOLUTION INTRAVENOUS at 06:26

## 2024-08-24 RX ADMIN — DEXTROSE MONOHYDRATE AND SODIUM CHLORIDE 100 ML/HR: 5; .45 INJECTION, SOLUTION INTRAVENOUS at 06:48

## 2024-08-24 RX ADMIN — HEPARIN SODIUM 5000 UNITS: 5000 INJECTION, SOLUTION INTRAVENOUS; SUBCUTANEOUS at 20:47

## 2024-08-24 RX ADMIN — HEPARIN SODIUM 5000 UNITS: 5000 INJECTION, SOLUTION INTRAVENOUS; SUBCUTANEOUS at 12:00

## 2024-08-24 RX ADMIN — Medication 2000 UNITS: at 12:00

## 2024-08-24 RX ADMIN — ATENOLOL 25 MG: 25 TABLET ORAL at 20:47

## 2024-08-24 SDOH — HEALTH STABILITY: MENTAL HEALTH: CURRENT SMOKER: 0

## 2024-08-24 ASSESSMENT — COGNITIVE AND FUNCTIONAL STATUS - GENERAL
TURNING FROM BACK TO SIDE WHILE IN FLAT BAD: A LITTLE
WALKING IN HOSPITAL ROOM: A LOT
MOVING TO AND FROM BED TO CHAIR: A LOT
MOBILITY SCORE: 13
PERSONAL GROOMING: A LITTLE
TOILETING: A LOT
DRESSING REGULAR LOWER BODY CLOTHING: A LITTLE
DRESSING REGULAR UPPER BODY CLOTHING: A LITTLE
MOVING FROM LYING ON BACK TO SITTING ON SIDE OF FLAT BED WITH BEDRAILS: A LITTLE
CLIMB 3 TO 5 STEPS WITH RAILING: TOTAL
STANDING UP FROM CHAIR USING ARMS: A LOT
HELP NEEDED FOR BATHING: A LOT
DAILY ACTIVITIY SCORE: 17

## 2024-08-24 ASSESSMENT — PAIN SCALES - GENERAL
PAINLEVEL_OUTOF10: 0 - NO PAIN
PAIN_LEVEL: 0
PAINLEVEL_OUTOF10: 0 - NO PAIN
PAINLEVEL_OUTOF10: 8
PAINLEVEL_OUTOF10: 8

## 2024-08-24 ASSESSMENT — PAIN DESCRIPTION - DESCRIPTORS: DESCRIPTORS: ACHING

## 2024-08-24 ASSESSMENT — PAIN - FUNCTIONAL ASSESSMENT
PAIN_FUNCTIONAL_ASSESSMENT: 0-10
PAIN_FUNCTIONAL_ASSESSMENT: FLACC (FACE, LEGS, ACTIVITY, CRY, CONSOLABILITY)
PAIN_FUNCTIONAL_ASSESSMENT: 0-10

## 2024-08-24 ASSESSMENT — PAIN DESCRIPTION - LOCATION: LOCATION: NECK

## 2024-08-24 ASSESSMENT — PAIN DESCRIPTION - ORIENTATION: ORIENTATION: MID

## 2024-08-24 NOTE — NURSING NOTE
Assumed care of patient. patient is resting comfortably in the bed. Call bell and possessions within reach. Bed alarm on.

## 2024-08-24 NOTE — NURSING NOTE
Agree with previous assessment. Patient lying comfortable in bed. Not in any acute distress. Denies any needs. Call light and possessions within reach. Bed alarm on.     Requested POA paperwork from daughter zita.

## 2024-08-24 NOTE — CONSULTS
Reason For Consult  Right hip dislocation    History Of Present Illness  Mariaelena Tavarez is a 89 y.o. female presenting with unwitnessed fall.  Family thinks that her fall may have been Thursday evening 8/22/2024.  They were contacted by the neighbor and they called the patient who lives alone.  They then found her on the ground Friday.  Unknown amount of time.  Discussion with the patient and family at the bedside.  Has pain in the right hip.  Has some pain in the left elbow.  Prior bilateral hip replacement by Dr. Hedrick.  I think about 10 years ago.  No records readily available.  Denies paresthesia in the leg.    There was an attempted reduction attempt in the ER last evening.  ER physician stated they were unsuccessful.  There is no post reduction attempt x-rays available.     Past Medical History  She has a past medical history of Age-related nuclear cataract of left eye (09/03/2023), Anxiety (09/03/2023), Benign paroxysmal positional vertigo (09/03/2023), Coronary artery disease (09/03/2023), Gastroesophageal reflux disease (09/03/2023), Hip dislocation, right, initial encounter (Multi) (08/23/2024), Hyperlipidemia (09/03/2023), Osteoarthritis (09/03/2023), Unspecified cataract, Urinary incontinence (09/03/2023), Venous thrombosis (09/03/2023), and Vitamin D deficiency (09/03/2023).    Surgical History  She has a past surgical history that includes Cataract extraction (09/04/2018) and CT guided imaging for needle placement (9/27/2016).     Social History  She reports that she has never smoked. She has never used smokeless tobacco. She reports that she does not drink alcohol and does not use drugs.    Family History  Family History   Problem Relation Name Age of Onset    Hypertension Mother      Stroke Mother      Arthritis Father      Parkinsonism Sister      Dementia Sister      Cancer Maternal Grandfather      Heart disease Other [place]     Hypertension Other [place]     Stroke Other [facility]     Hypertension Sibling       Stroke Sibling          Allergies  Simvastatin, Iodinated contrast media, Lisinopril, and Sulfa (sulfonamide antibiotics)    Review of Systems  As above     Physical Exam  Alert and oriented x 2  Family at the bedside  Right lower extremity: Leg is internally rotated.  Bruise over the distal thigh.  No bruising about her posterior lateral incision.  Compartments are soft.  Mild swelling.  Active ankle dorsiflexion plantarflexion and EHL function intact.  Sensation intact to light touch.    General musculoskeletal exam reveals some bruising over the lateral and posterior aspect of the left elbow.  Some bruising over the right forearm.  Moves all extremities within normal limits.  No pain to palpation.     Last Recorded Vitals  Blood pressure 140/70, pulse 65, temperature 37 °C (98.6 °F), temperature source Temporal, resp. rate 16, height 1.524 m (5'), weight (!) 39.5 kg (87 lb), SpO2 97%.    Relevant Results    I reviewed x-ray imaging report of the pelvis.  There appears to be a posterior lateral dislocation of the right prosthetic joint.  Unclear of the implant prosthesis may be [employer] or [employer]uy.  Prosthesis appears normally aligned without periprosthetic fracture.  No post procedure x-rays available.    Scheduled medications  amLODIPine, 10 mg, oral, Daily  [Held by provider] aspirin, 81 mg, oral, Daily  atenolol, 25 mg, oral, BID  cholecalciferol, 2,000 Units, oral, Daily  cyanocobalamin, 1,000 mcg, oral, Daily  donepezil, 10 mg, oral, Nightly  escitalopram, 5 mg, oral, Daily  gabapentin, 100 mg, oral, q AM  gabapentin, 200 mg, oral, Nightly  heparin (porcine), 5,000 Units, subcutaneous, BID  levothyroxine, 25 mcg, oral, Daily before breakfast  pantoprazole, 40 mg, oral, Daily before breakfast   Or  pantoprazole, 40 mg, intravenous, Daily before breakfast  [Held by provider] rosuvastatin, 40 mg, oral, Nightly      Continuous medications  dextrose 5%-0.45 % sodium chloride, 100 mL/hr, Last Rate: 100 mL/hr  (08/24/24 0661)  oxygen, , Last Rate: 2 L/min (08/23/24 1345)      PRN medications  PRN medications: acetaminophen **OR** acetaminophen **OR** acetaminophen, benzocaine-menthol, bisacodyl, bisacodyl, dextromethorphan-guaifenesin, ondansetron ODT **OR** ondansetron, polyethylene glycol, prochlorperazine **OR** prochlorperazine **OR** prochlorperazine, traMADol  Results for orders placed or performed during the hospital encounter of 08/23/24 (from the past 24 hour(s))   CBC and Auto Differential   Result Value Ref Range    WBC 12.1 (H) 4.4 - 11.3 x10*3/uL    nRBC 0.0 0.0 - 0.0 /100 WBCs    RBC 4.83 4.00 - 5.20 x10*6/uL    Hemoglobin 15.2 12.0 - 16.0 g/dL    Hematocrit 43.7 36.0 - 46.0 %    MCV 91 80 - 100 fL    MCH 31.5 26.0 - 34.0 pg    MCHC 34.8 32.0 - 36.0 g/dL    RDW 12.3 11.5 - 14.5 %    Platelets 216 150 - 450 x10*3/uL    Neutrophils % 82.6 40.0 - 80.0 %    Immature Granulocytes %, Automated 0.3 0.0 - 0.9 %    Lymphocytes % 8.3 13.0 - 44.0 %    Monocytes % 8.7 2.0 - 10.0 %    Eosinophils % 0.0 0.0 - 6.0 %    Basophils % 0.1 0.0 - 2.0 %    Neutrophils Absolute 9.98 (H) 1.60 - 5.50 x10*3/uL    Immature Granulocytes Absolute, Automated 0.04 0.00 - 0.50 x10*3/uL    Lymphocytes Absolute 1.00 0.80 - 3.00 x10*3/uL    Monocytes Absolute 1.05 (H) 0.05 - 0.80 x10*3/uL    Eosinophils Absolute 0.00 0.00 - 0.40 x10*3/uL    Basophils Absolute 0.01 0.00 - 0.10 x10*3/uL   Phosphorus   Result Value Ref Range    Phosphorus 3.2 2.5 - 4.5 mg/dL   Magnesium   Result Value Ref Range    Magnesium 2.30 1.60 - 3.10 mg/dL   Comprehensive metabolic panel   Result Value Ref Range    Glucose 109 (H) 65 - 99 mg/dL    Sodium 130 (L) 133 - 145 mmol/L    Potassium 3.3 (L) 3.4 - 5.1 mmol/L    Chloride 91 (L) 97 - 107 mmol/L    Bicarbonate 27 24 - 31 mmol/L    Urea Nitrogen 19 8 - 25 mg/dL    Creatinine 0.60 0.40 - 1.60 mg/dL    eGFR 86 >60 mL/min/1.73m*2    Calcium 10.1 8.5 - 10.4 mg/dL    Albumin 4.4 3.5 - 5.0 g/dL    Alkaline Phosphatase 87 35  - 125 U/L    Total Protein 7.4 5.9 - 7.9 g/dL    AST 95 (H) 5 - 40 U/L    Bilirubin, Total 2.4 (H) 0.1 - 1.2 mg/dL    ALT 45 (H) 5 - 40 U/L    Anion Gap 12 <=19 mmol/L   Cardiac Enzymes - CPK   Result Value Ref Range    Creatine Kinase 3,541 (H) 24 - 195 U/L   ECG 12 lead   Result Value Ref Range    Ventricular Rate 80 BPM    Atrial Rate 78 BPM    SD Interval 140 ms    QRS Duration 128 ms    QT Interval 502 ms    QTC Calculation(Bazett) 578 ms    P Axis 110 degrees    R Axis 66 degrees    T Axis 39 degrees    QRS Count 13 beats    Q Onset 211 ms    P Onset 141 ms    P Offset 164 ms    T Offset 462 ms    QTC Fredericia 553 ms   Urinalysis with Reflex Culture and Microscopic   Result Value Ref Range    Color, Urine Light-Yellow Light-Yellow, Yellow, Dark-Yellow    Appearance, Urine Clear Clear    Specific Gravity, Urine 1.015 1.005 - 1.035    pH, Urine 6.0 5.0, 5.5, 6.0, 6.5, 7.0, 7.5, 8.0    Protein, Urine 50 (1+) (A) NEGATIVE, 10 (TRACE), 20 (TRACE) mg/dL    Glucose, Urine Normal Normal mg/dL    Blood, Urine 0.5 (2+) (A) NEGATIVE    Ketones, Urine 40 (2+) (A) NEGATIVE mg/dL    Bilirubin, Urine NEGATIVE NEGATIVE    Urobilinogen, Urine Normal Normal mg/dL    Nitrite, Urine NEGATIVE NEGATIVE    Leukocyte Esterase, Urine NEGATIVE NEGATIVE   Urinalysis Microscopic   Result Value Ref Range    WBC, Urine 1-5 1-5, NONE /HPF    RBC, Urine 3-5 NONE, 1-2, 3-5 /HPF    Mucus, Urine FEW Reference range not established. /LPF   Comprehensive metabolic panel   Result Value Ref Range    Glucose 161 (H) 65 - 99 mg/dL    Sodium 132 (L) 133 - 145 mmol/L    Potassium 3.3 (L) 3.4 - 5.1 mmol/L    Chloride 98 97 - 107 mmol/L    Bicarbonate 21 (L) 24 - 31 mmol/L    Urea Nitrogen 15 8 - 25 mg/dL    Creatinine 0.50 0.40 - 1.60 mg/dL    eGFR 90 >60 mL/min/1.73m*2    Calcium 8.7 8.5 - 10.4 mg/dL    Albumin 3.7 3.5 - 5.0 g/dL    Alkaline Phosphatase 69 35 - 125 U/L    Total Protein 6.3 5.9 - 7.9 g/dL    AST 87 (H) 5 - 40 U/L    Bilirubin, Total  1.8 (H) 0.1 - 1.2 mg/dL    ALT 41 (H) 5 - 40 U/L    Anion Gap 13 <=19 mmol/L   CBC   Result Value Ref Range    WBC 10.0 4.4 - 11.3 x10*3/uL    nRBC 0.0 0.0 - 0.0 /100 WBCs    RBC 3.93 (L) 4.00 - 5.20 x10*6/uL    Hemoglobin 12.2 12.0 - 16.0 g/dL    Hematocrit 37.2 36.0 - 46.0 %    MCV 95 80 - 100 fL    MCH 31.0 26.0 - 34.0 pg    MCHC 32.8 32.0 - 36.0 g/dL    RDW 12.8 11.5 - 14.5 %    Platelets 187 150 - 450 x10*3/uL   Creatine Kinase   Result Value Ref Range    Creatine Kinase 2,353 (H) 24 - 195 U/L        Assessment/Plan     Prosthetic joint dislocation right hip: Patient had a unsuccessful attempt at reduction last evening.  Will plan 4 OR today for close reduction.  Risk benefits complications and alternatives discussed with the patient and family.  They elect to proceed.  Will plan for knee immobilizer weightbearing as tolerated pending successful reduction postoperatively.  If we are unsuccessful we will have to discuss further alternatives.  Rhabdomyolysis: Per medical team.    Luiz Mccray MD

## 2024-08-24 NOTE — DISCHARGE INSTRUCTIONS
Orthopaedic Surgery:  Luiz Mccray MD was your surgeon.    Weight bearing: weight bear as tolerated  Knee immobilizer: wear at night for 6 weeks after surgery. OK to remove during the daytime  Showering: may shower with assistance  Bathing: no tub bathing or pools  Driving: no driving    Call for follow-up appointment in 4 weeks. [phone]    Call with any concerns.

## 2024-08-24 NOTE — PROGRESS NOTES
Occupational Therapy                 Therapy Communication Note    Patient Name: Mariaelena Tavarez  MRN: [id number]  Today's Date: 8/24/2024     Discipline: Occupational Therapy    Missed Visit Reason: Missed Visit Reason: Patient in a medical procedure (Pt in sx for dislocated Rt hip prothesis, closed reduction. will need new orders)    Missed Time: Cancel    Comment:

## 2024-08-24 NOTE — HOSPITAL COURSE
Patient admitted for evaluation of fall, found out that has rhabdomyolysis and right hip prosthetic dislocation . Patient had history of bilateral hip surgery.  Surgeon was consulted.  N.p.o. after midnight.  DC to SNF

## 2024-08-24 NOTE — PROGRESS NOTES
Mariaelena Tavarez is a 89 y.o. female on day 1 of admission presenting with Fall, initial encounter.      Subjective   Patient seen and examined. Resting in bed, family at the bedside. States pain well controlled post closed hip reduction. Afebrile.        Objective     Last Recorded Vitals  /71 (BP Location: Right arm, Patient Position: Lying)   Pulse 73   Temp 36.8 °C (98.2 °F) (Temporal)   Resp 15   Wt (!) 39.5 kg (87 lb)   SpO2 96%   Intake/Output last 3 Shifts:    Intake/Output Summary (Last 24 hours) at 8/24/2024 1505  Last data filed at 8/24/2024 1455  Gross per 24 hour   Intake 3101.67 ml   Output 1850 ml   Net 1251.67 ml       Admission Weight  Weight: (!) 39.5 kg (87 lb) (08/23/24 1227)    Daily Weight  08/23/24 : (!) 39.5 kg (87 lb)    Image Results    No new imaging to review.     Physical Exam    General: Alert, pleasant, in no acute distress.   Cardiac: Regular rate and rhythm, S1/S2 , no murmur.   Pulmonary: Clear to auscultation on 2L NC.    Abdomen: Soft, round, nontender. BS +x4.   Extremities: No edema. RLE immobilizer in place.   Skin: No rashes or lesions.    : [place] with clear, yellow urine.     Relevant Results    Scheduled medications  amLODIPine, 10 mg, oral, Daily  aspirin, 81 mg, oral, Daily  atenolol, 25 mg, oral, BID  cholecalciferol, 2,000 Units, oral, Daily  cyanocobalamin, 1,000 mcg, oral, Daily  donepezil, 10 mg, oral, Nightly  escitalopram, 5 mg, oral, Daily  gabapentin, 100 mg, oral, q AM  gabapentin, 200 mg, oral, Nightly  heparin (porcine), 5,000 Units, subcutaneous, BID  levothyroxine, 25 mcg, oral, Daily before breakfast  pantoprazole, 40 mg, oral, Daily before breakfast   Or  pantoprazole, 40 mg, intravenous, Daily before breakfast  [Held by provider] rosuvastatin, 40 mg, oral, Nightly      Continuous medications  dextrose 5%-0.45 % sodium chloride, 100 mL/hr, Last Rate: 100 mL/hr (08/24/24 1455)  oxygen, , Last Rate: 2 L/min (08/23/24 1345)      PRN medications  PRN  medications: acetaminophen **OR** acetaminophen **OR** acetaminophen, benzocaine-menthol, bisacodyl, bisacodyl, dextromethorphan-guaifenesin, ondansetron ODT **OR** ondansetron, polyethylene glycol, prochlorperazine **OR** prochlorperazine **OR** prochlorperazine, traMADol     Results for orders placed or performed during the hospital encounter of 08/23/24 (from the past 24 hour(s))   Urinalysis with Reflex Culture and Microscopic   Result Value Ref Range    Color, Urine Light-Yellow Light-Yellow, Yellow, Dark-Yellow    Appearance, Urine Clear Clear    Specific Gravity, Urine 1.015 1.005 - 1.035    pH, Urine 6.0 5.0, 5.5, 6.0, 6.5, 7.0, 7.5, 8.0    Protein, Urine 50 (1+) (A) NEGATIVE, 10 (TRACE), 20 (TRACE) mg/dL    Glucose, Urine Normal Normal mg/dL    Blood, Urine 0.5 (2+) (A) NEGATIVE    Ketones, Urine 40 (2+) (A) NEGATIVE mg/dL    Bilirubin, Urine NEGATIVE NEGATIVE    Urobilinogen, Urine Normal Normal mg/dL    Nitrite, Urine NEGATIVE NEGATIVE    Leukocyte Esterase, Urine NEGATIVE NEGATIVE   Urinalysis Microscopic   Result Value Ref Range    WBC, Urine 1-5 1-5, NONE /HPF    RBC, Urine 3-5 NONE, 1-2, 3-5 /HPF    Mucus, Urine FEW Reference range not established. /LPF   Comprehensive metabolic panel   Result Value Ref Range    Glucose 161 (H) 65 - 99 mg/dL    Sodium 132 (L) 133 - 145 mmol/L    Potassium 3.3 (L) 3.4 - 5.1 mmol/L    Chloride 98 97 - 107 mmol/L    Bicarbonate 21 (L) 24 - 31 mmol/L    Urea Nitrogen 15 8 - 25 mg/dL    Creatinine 0.50 0.40 - 1.60 mg/dL    eGFR 90 >60 mL/min/1.73m*2    Calcium 8.7 8.5 - 10.4 mg/dL    Albumin 3.7 3.5 - 5.0 g/dL    Alkaline Phosphatase 69 35 - 125 U/L    Total Protein 6.3 5.9 - 7.9 g/dL    AST 87 (H) 5 - 40 U/L    Bilirubin, Total 1.8 (H) 0.1 - 1.2 mg/dL    ALT 41 (H) 5 - 40 U/L    Anion Gap 13 <=19 mmol/L   CBC   Result Value Ref Range    WBC 10.0 4.4 - 11.3 x10*3/uL    nRBC 0.0 0.0 - 0.0 /100 WBCs    RBC 3.93 (L) 4.00 - 5.20 x10*6/uL    Hemoglobin 12.2 12.0 - 16.0 g/dL     Hematocrit 37.2 36.0 - 46.0 %    MCV 95 80 - 100 fL    MCH 31.0 26.0 - 34.0 pg    MCHC 32.8 32.0 - 36.0 g/dL    RDW 12.8 11.5 - 14.5 %    Platelets 187 150 - 450 x10*3/uL   Creatine Kinase   Result Value Ref Range    Creatine Kinase 2,353 (H) 24 - 195 U/L             Assessment/Plan      Fall with R Hip Dislocation  -Ortho follows.   -POD #0 closed reduction.   -Immobilizer in place, weight bearing and activity restrictions per ortho.   -PT/OT evals.   -Pain control.     Rhabdomyolysis  -CK 3,541 on admission, now 2,353.   -Continue IVF hydration, fluids adjusted.   -Monitor CK levels.   -Stopped statin.   -Monitor renal function closely, currently stable.     HTN  -Continue norvasc and BB.   -BP currently stable, monitor.     CAD  -Continue core meds.   -Denies any chest pain.     Hypothyroidism  -Continue Synthroid.     Dementia  -Continue Aricept.   -Supportive care.     DVT Risk  -Heparin subcutaneous.     Plan   Ortho follows. S/P closed reduction in OR this AM.   Continue with pain control, immoblizer. PT/OT evals.   Continue IVF hydration for rhabdomyolysis. Monitor CK levels. Stopped statin.   Anticipate patient will need SNF.         SHANIKA Potter-CNP

## 2024-08-24 NOTE — CARE PLAN
Problem: Skin  Goal: Decreased wound size/increased tissue granulation at next dressing change  Outcome: Progressing  Flowsheets (Taken 8/24/2024 0254)  Decreased wound size/increased tissue granulation at next dressing change:   Promote sleep for wound healing   Protective dressings over bony prominences   Utilize specialty bed per algorithm  Goal: Participates in plan/prevention/treatment measures  Outcome: Progressing  Flowsheets (Taken 8/24/2024 0254)  Participates in plan/prevention/treatment measures: Elevate heels  Goal: Prevent/manage excess moisture  Outcome: Progressing  Flowsheets (Taken 8/24/2024 0254)  Prevent/manage excess moisture: Monitor for/manage infection if present  Goal: Prevent/minimize sheer/friction injuries  Outcome: Progressing  Flowsheets (Taken 8/24/2024 0254)  Prevent/minimize sheer/friction injuries:   Use pull sheet   HOB 30 degrees or less   Utilize specialty bed per algorithm  Goal: Promote/optimize nutrition  Outcome: Progressing  Flowsheets (Taken 8/24/2024 0254)  Promote/optimize nutrition:   Monitor/record intake including meals   Offer water/supplements/favorite foods  Goal: Promote skin healing  Outcome: Progressing  Flowsheets (Taken 8/24/2024 0254)  Promote skin healing:   Turn/reposition every 2 hours/use positioning/transfer devices   Protective dressings over bony prominences     Problem: Safety - Adult  Goal: Free from fall injury  Outcome: Progressing     Problem: Discharge Planning  Goal: Discharge to home or other facility with appropriate resources  Outcome: Progressing     Problem: Chronic Conditions and Co-morbidities  Goal: Patient's chronic conditions and co-morbidity symptoms are monitored and maintained or improved  Outcome: Progressing     Problem: Pain  Goal: Takes deep breaths with improved pain control throughout the shift  Outcome: Progressing  Goal: Turns in bed with improved pain control throughout the shift  Outcome: Progressing  Goal: Performs ADL's  with improved pain control throughout shift  Outcome: Progressing  Goal: Free from opioid side effects throughout the shift  Outcome: Progressing  Goal: Free from acute confusion related to pain meds throughout the shift  Outcome: Progressing     Problem: Deep Vein Thrombosis  Goal: I will remain free from complications of deep vein thrombosis and maintain current level of mobility  Outcome: Progressing     Problem: Nutrition  Goal: Oral intake greater 75%  Outcome: Progressing  Goal: Nutrition support goals are met within 48 hrs  Outcome: Progressing   The patient's goals for the shift include  Pain management, rest    The clinical goals for the shift include pain management, monitor labs and vs, maintain safety, promote rest      08/24/24 at 2:56 AM - Yaquelin Liang RN

## 2024-08-24 NOTE — ANESTHESIA PREPROCEDURE EVALUATION
Patient: Mariaelena Tavarez    Procedure Information       Date/Time: 08/24/24 1005    Procedure: Hip Dislocation Total Closed Reduction (Right: Hip)    Location: [facility] / [facility]    Surgeons: Luiz Mccray MD            Relevant Problems   Anesthesia (within normal limits)      Cardiac   (+) Coronary artery disease   (+) Hyperlipidemia   (+) Hypertension      Pulmonary (within normal limits)      Neuro   (+) Anxiety   (+) Dementia (Multi)      GI   (+) Gastroesophageal reflux disease      /Renal (within normal limits)  Rhabdomyolysis - was found on the floor Friday after a fall Thurs or Fri      Liver (within normal limits)      Endocrine   (+) Hypothyroid      Hematology (within normal limits)      Musculoskeletal   (+) Osteoarthritis      HEENT   (+) Cataract present      ID (within normal limits)      Skin (within normal limits)      GYN (within normal limits)     Past Surgical History:   Procedure Laterality Date    CATARACT EXTRACTION  09/04/2018    Cataract Surgery    CT GUIDED IMAGING FOR NEEDLE PLACEMENT  9/27/2016    CT GUIDED IMAGING FOR NEEDLE PLACEMENT [facility]   Bilat ROSHNI    Clinical information reviewed:   Tobacco  Allergies  Meds  Problems  Med Hx  Surg Hx   Fam Hx          NPO Detail:  [place]      Chemistry    Lab Results   Component Value Date/Time     (L) 08/23/2024 1859    K 3.3 (L) 08/23/2024 1859    CL 98 08/23/2024 1859    CO2 21 (L) 08/23/2024 1859    BUN 15 08/23/2024 1859    CREATININE 0.50 08/23/2024 1859    Lab Results   Component Value Date/Time    CALCIUM 8.7 08/23/2024 1859    ALKPHOS 69 08/23/2024 1859    AST 87 (H) 08/23/2024 1859    ALT 41 (H) 08/23/2024 1859    BILITOT 1.8 (H) 08/23/2024 1859        Lab Results   Component Value Date    WBC 10.0 08/24/2024    HGB 12.2 08/24/2024    HCT 37.2 08/24/2024    MCV 95 08/24/2024     08/24/2024          Physical Exam    Airway  Mallampati: II  TM distance: >3 FB  Neck ROM: full     Cardiovascular     Dental    Pulmonary    Abdominal            Anesthesia Plan    History of general anesthesia?: yes  History of complications of general anesthesia?: no    ASA 3     general     The patient is not a current smoker.  Patient was not previously instructed to abstain from smoking on day of procedure.  Patient did not smoke on day of procedure.  Education provided regarding risk of obstructive sleep apnea.  intravenous induction   Trial extubation is planned.  Anesthetic plan and risks discussed with patient.    Plan discussed with [facility].

## 2024-08-24 NOTE — PROGRESS NOTES
Spiritual Care Visit    Clinical Encounter Type  Visited With: Patient not available  Routine Visit: Introduction  Continue Visiting: Yes     Jesus Duque

## 2024-08-24 NOTE — CARE PLAN
The patient's goals for the shift include      The clinical goals for the shift include safety, monitor labs and vitals, pain control      Problem: Skin  Goal: Decreased wound size/increased tissue granulation at next dressing change  Outcome: Progressing  Goal: Participates in plan/prevention/treatment measures  Outcome: Progressing  Goal: Prevent/manage excess moisture  Outcome: Progressing  Goal: Prevent/minimize sheer/friction injuries  Outcome: Progressing  Goal: Promote/optimize nutrition  Outcome: Progressing  Goal: Promote skin healing  Outcome: Progressing     Problem: Safety - Adult  Goal: Free from fall injury  Outcome: Progressing     Problem: Discharge Planning  Goal: Discharge to home or other facility with appropriate resources  Outcome: Progressing     Problem: Chronic Conditions and Co-morbidities  Goal: Patient's chronic conditions and co-morbidity symptoms are monitored and maintained or improved  Outcome: Progressing     Problem: Pain  Goal: Takes deep breaths with improved pain control throughout the shift  Outcome: Progressing  Goal: Turns in bed with improved pain control throughout the shift  Outcome: Progressing  Goal: Walks with improved pain control throughout the shift  Outcome: Progressing  Goal: Performs ADL's with improved pain control throughout shift  Outcome: Progressing  Goal: Participates in PT with improved pain control throughout the shift  Outcome: Progressing  Goal: Free from opioid side effects throughout the shift  Outcome: Progressing  Goal: Free from acute confusion related to pain meds throughout the shift  Outcome: Progressing     Problem: Deep Vein Thrombosis  Goal: I will remain free from complications of deep vein thrombosis and maintain current level of mobility  Outcome: Progressing     Problem: Nutrition  Goal: Oral intake greater 75%  Outcome: Progressing  Goal: Nutrition support goals are met within 48 hrs  Outcome: Progressing

## 2024-08-24 NOTE — ANESTHESIA POSTPROCEDURE EVALUATION
Patient: Mariaelena Tavarez    Procedure Summary       Date: 08/24/24 Room / Location: [facility]    Anesthesia Start: 0940 Anesthesia Stop: 1017    Procedure: Hip Dislocation Total Closed Reduction (Right: Hip) Diagnosis:       Hip dislocation, right, initial encounter (Multi)      (Hip dislocation, right, initial encounter (Multi) [S73.004A])    Surgeons: Luiz Mccray MD Responsible Provider: Jossie Leung MD MPH    Anesthesia Type: general ASA Status: 3            Anesthesia Type: general    Vitals Value Taken Time   /96 08/24/24 1030   Temp 36.2 °C (97.2 °F) 08/24/24 1015   Pulse 99 08/24/24 1030   Resp 7 08/24/24 1030   SpO2 100 % 08/24/24 1030   Vitals shown include unfiled device data.    Anesthesia Post Evaluation    Patient location during evaluation: PACU  Patient participation: complete - patient participated  Level of consciousness: awake and alert  Pain score: 0  Pain management: adequate  Multimodal analgesia pain management approach  Airway patency: patent  Two or more strategies used to mitigate risk of obstructive sleep apnea  Cardiovascular status: acceptable  Respiratory status: acceptable  Hydration status: acceptable  Postoperative Nausea and Vomiting: none    There were no known notable events for this encounter.

## 2024-08-24 NOTE — PROGRESS NOTES
Physical Therapy                 Therapy Communication Note    Patient Name: Mariaelena Tavarez  MRN: [id number]  Today's Date: 8/24/2024     Discipline: Physical Therapy    Missed Visit Reason: Patient in a medical procedure; Cancel PT Evaluation - pt admitted with fall resulting dislocation of R hip prosthesis; pt currently in surgery for closed reduction of R hip, Will need new orders post surgery.

## 2024-08-24 NOTE — OP NOTE
Hip Dislocation Total Closed Reduction  99976 - SD CLTX POST HIP ARTHRP DISLC REQ ANES   Operative Note     Date: 2024 - 2024  OR Location: [facility]    Name: Mariaelena Tavarez : 1935, Age: 89 y.o., MRN: [id number], Sex: female    PRE Diagnosis  Right hip prosthetic joint dislocation    POST Diagnosis  Same    Procedures  Closed reduction right prosthetic hip dislocation    Surgeons      * Luiz Mccray - Primary    Resident/Fellow/Other Assistant:  yuri Huffman    Procedure Summary  Implants:  None    Anesthesia: General  ASA: III  Anesthesia Staff:   Anesthesiologist: Jossie Leung MD MPH  C-AA: AARTI Pittman  Estimated Blood Loss: 0    Specimen: No specimens collected     Staff:   No surgical staff documented.         Drains and/or Catheters:   None    Tourniquet Times:                Findings: Preprocedure x-rays did not reveal any periprosthetic fracture.  Revealed posterior lateral dislocation.  We struggled to reduce her hip but were eventually successful with hip flexion at 100 internal rotation 45 axial traction and then hip extension.  Concentric reduction on x-ray.  No periprosthetic fracture.    Indications: Mariaelena Tavarez is an 89 y.o. female who is having surgery for Hip dislocation, right, initial encounter (Multi) [S73.004A].   Presented with an unwitnessed fall.  Down for some time.  Presented with dislocation of her right prosthetic hip joint.  Also had rhabdomyolysis.  Admitted for optimization.  Failed attempt at reduction in the ER.      Procedure Details: Patient was seen evaluated in the preoperative area.  Right leg marked as the operative extremity.  She was brought back to the operating room.  Anesthesia to control the head neck and airway.  Gentle sedation administered and she was brought over to the operating room table.  I then checked fluoroscopy.  Posterior lateral dislocation without periprosthetic fracture.  We then attempted with propofol sedation a reduction.  This was  unsuccessful.  We then used paralysis.  I was finally able to reduce her with hip flexion at approximately 100 degrees, internal rotation 45 degrees axial traction with my assistant pushing on the femoral head from the posterior aspect.  We then extended the hip joint and felt that slide into position.  AP and lateral x-rays were obtained.  Concentric reduction no periprosthetic fracture.  She was placed into a knee immobilizer.  Awakened taken the PACU in stable condition.  No complications.    complications:  None; patient tolerated the procedure well.    Disposition: PACU - hemodynamically stable.  Condition: stable             Attending Attestation: I performed the procedure.    Luiz Mccray  Phone Number: [phone]

## 2024-08-25 VITALS
HEART RATE: 64 BPM | HEIGHT: 60 IN | OXYGEN SATURATION: 99 % | WEIGHT: 87 LBS | RESPIRATION RATE: 16 BRPM | TEMPERATURE: 98.2 F | BODY MASS INDEX: 17.08 KG/M2 | DIASTOLIC BLOOD PRESSURE: 61 MMHG | SYSTOLIC BLOOD PRESSURE: 117 MMHG

## 2024-08-25 LAB
ANION GAP SERPL CALC-SCNC: 8 MMOL/L
BUN SERPL-MCNC: 7 MG/DL (ref 8–25)
CALCIUM SERPL-MCNC: 8.5 MG/DL (ref 8.5–10.4)
CHLORIDE SERPL-SCNC: 104 MMOL/L (ref 97–107)
CK SERPL-CCNC: 1245 U/L (ref 24–195)
CO2 SERPL-SCNC: 25 MMOL/L (ref 24–31)
CREAT SERPL-MCNC: 0.5 MG/DL (ref 0.4–1.6)
EGFRCR SERPLBLD CKD-EPI 2021: 90 ML/MIN/1.73M*2
ERYTHROCYTE [DISTWIDTH] IN BLOOD BY AUTOMATED COUNT: 12.5 % (ref 11.5–14.5)
GLUCOSE SERPL-MCNC: 111 MG/DL (ref 65–99)
HCT VFR BLD AUTO: 37.1 % (ref 36–46)
HGB BLD-MCNC: 12.3 G/DL (ref 12–16)
MAGNESIUM SERPL-MCNC: 2.1 MG/DL (ref 1.6–3.1)
MCH RBC QN AUTO: 31.5 PG (ref 26–34)
MCHC RBC AUTO-ENTMCNC: 33.2 G/DL (ref 32–36)
MCV RBC AUTO: 95 FL (ref 80–100)
NRBC BLD-RTO: 0 /100 WBCS (ref 0–0)
PLATELET # BLD AUTO: 168 X10*3/UL (ref 150–450)
POTASSIUM SERPL-SCNC: 3.1 MMOL/L (ref 3.4–5.1)
RBC # BLD AUTO: 3.91 X10*6/UL (ref 4–5.2)
SODIUM SERPL-SCNC: 137 MMOL/L (ref 133–145)
WBC # BLD AUTO: 8.2 X10*3/UL (ref 4.4–11.3)

## 2024-08-25 PROCEDURE — 1200000002 HC GENERAL ROOM WITH TELEMETRY DAILY

## 2024-08-25 PROCEDURE — 99232 SBSQ HOSP IP/OBS MODERATE 35: CPT | Performed by: NURSE PRACTITIONER

## 2024-08-25 PROCEDURE — 85027 COMPLETE CBC AUTOMATED: CPT | Performed by: ORTHOPAEDIC SURGERY

## 2024-08-25 PROCEDURE — 2500000001 HC RX 250 WO HCPCS SELF ADMINISTERED DRUGS (ALT 637 FOR MEDICARE OP): Performed by: NURSE PRACTITIONER

## 2024-08-25 PROCEDURE — 2500000001 HC RX 250 WO HCPCS SELF ADMINISTERED DRUGS (ALT 637 FOR MEDICARE OP): Performed by: ORTHOPAEDIC SURGERY

## 2024-08-25 PROCEDURE — 2500000002 HC RX 250 W HCPCS SELF ADMINISTERED DRUGS (ALT 637 FOR MEDICARE OP, ALT 636 FOR OP/ED): Performed by: NURSE PRACTITIONER

## 2024-08-25 PROCEDURE — 97110 THERAPEUTIC EXERCISES: CPT | Mod: GP

## 2024-08-25 PROCEDURE — 97530 THERAPEUTIC ACTIVITIES: CPT | Mod: GP

## 2024-08-25 PROCEDURE — 2500000004 HC RX 250 GENERAL PHARMACY W/ HCPCS (ALT 636 FOR OP/ED): Performed by: NURSE PRACTITIONER

## 2024-08-25 PROCEDURE — 2500000004 HC RX 250 GENERAL PHARMACY W/ HCPCS (ALT 636 FOR OP/ED): Performed by: ORTHOPAEDIC SURGERY

## 2024-08-25 PROCEDURE — 83735 ASSAY OF MAGNESIUM: CPT | Performed by: NURSE PRACTITIONER

## 2024-08-25 PROCEDURE — 82550 ASSAY OF CK (CPK): CPT | Performed by: ORTHOPAEDIC SURGERY

## 2024-08-25 PROCEDURE — 97162 PT EVAL MOD COMPLEX 30 MIN: CPT | Mod: GP

## 2024-08-25 PROCEDURE — 80048 BASIC METABOLIC PNL TOTAL CA: CPT | Performed by: NURSE PRACTITIONER

## 2024-08-25 PROCEDURE — 36415 COLL VENOUS BLD VENIPUNCTURE: CPT | Performed by: ORTHOPAEDIC SURGERY

## 2024-08-25 RX ORDER — POTASSIUM CHLORIDE 20 MEQ/1
40 TABLET, EXTENDED RELEASE ORAL ONCE
Status: COMPLETED | OUTPATIENT
Start: 2024-08-25 | End: 2024-08-25

## 2024-08-25 RX ORDER — TALC
3 POWDER (GRAM) TOPICAL NIGHTLY
Status: DISCONTINUED | OUTPATIENT
Start: 2024-08-25 | End: 2024-08-26 | Stop reason: HOSPADM

## 2024-08-25 RX ADMIN — AMLODIPINE BESYLATE 10 MG: 10 TABLET ORAL at 10:16

## 2024-08-25 RX ADMIN — SODIUM CHLORIDE 100 ML/HR: 900 INJECTION, SOLUTION INTRAVENOUS at 10:16

## 2024-08-25 RX ADMIN — LEVOTHYROXINE SODIUM 25 MCG: 0.03 TABLET ORAL at 06:46

## 2024-08-25 RX ADMIN — DONEPEZIL HYDROCHLORIDE 10 MG: 10 TABLET, FILM COATED ORAL at 21:06

## 2024-08-25 RX ADMIN — ASPIRIN 81 MG CHEWABLE TABLET 81 MG: 81 TABLET CHEWABLE at 10:16

## 2024-08-25 RX ADMIN — CYANOCOBALAMIN TAB 1000 MCG 1000 MCG: 1000 TAB at 10:16

## 2024-08-25 RX ADMIN — ATENOLOL 25 MG: 25 TABLET ORAL at 10:16

## 2024-08-25 RX ADMIN — POTASSIUM CHLORIDE 40 MEQ: 1500 TABLET, EXTENDED RELEASE ORAL at 10:16

## 2024-08-25 RX ADMIN — HEPARIN SODIUM 5000 UNITS: 5000 INJECTION, SOLUTION INTRAVENOUS; SUBCUTANEOUS at 21:06

## 2024-08-25 RX ADMIN — HEPARIN SODIUM 5000 UNITS: 5000 INJECTION, SOLUTION INTRAVENOUS; SUBCUTANEOUS at 10:19

## 2024-08-25 RX ADMIN — PANTOPRAZOLE SODIUM 40 MG: 40 TABLET, DELAYED RELEASE ORAL at 06:46

## 2024-08-25 RX ADMIN — ATENOLOL 25 MG: 25 TABLET ORAL at 21:06

## 2024-08-25 RX ADMIN — Medication 2000 UNITS: at 10:16

## 2024-08-25 RX ADMIN — Medication 3 MG: at 21:06

## 2024-08-25 RX ADMIN — GABAPENTIN 200 MG: 100 CAPSULE ORAL at 21:06

## 2024-08-25 RX ADMIN — GABAPENTIN 100 MG: 100 CAPSULE ORAL at 10:16

## 2024-08-25 RX ADMIN — SODIUM CHLORIDE 100 ML/HR: 900 INJECTION, SOLUTION INTRAVENOUS at 21:06

## 2024-08-25 RX ADMIN — ESCITALOPRAM OXALATE 5 MG: 10 TABLET ORAL at 10:16

## 2024-08-25 ASSESSMENT — COGNITIVE AND FUNCTIONAL STATUS - GENERAL
MOVING TO AND FROM BED TO CHAIR: A LOT
CLIMB 3 TO 5 STEPS WITH RAILING: TOTAL
WALKING IN HOSPITAL ROOM: TOTAL
MOBILITY SCORE: 10
PERSONAL GROOMING: A LITTLE
TOILETING: A LOT
MOVING FROM LYING ON BACK TO SITTING ON SIDE OF FLAT BED WITH BEDRAILS: A LOT
WALKING IN HOSPITAL ROOM: TOTAL
MOVING FROM LYING ON BACK TO SITTING ON SIDE OF FLAT BED WITH BEDRAILS: A LITTLE
MOVING TO AND FROM BED TO CHAIR: A LOT
EATING MEALS: A LITTLE
TURNING FROM BACK TO SIDE WHILE IN FLAT BAD: A LOT
CLIMB 3 TO 5 STEPS WITH RAILING: TOTAL
MOBILITY SCORE: 11
HELP NEEDED FOR BATHING: A LOT
TURNING FROM BACK TO SIDE WHILE IN FLAT BAD: A LOT
DRESSING REGULAR UPPER BODY CLOTHING: A LITTLE
STANDING UP FROM CHAIR USING ARMS: A LOT
DAILY ACTIVITIY SCORE: 16
STANDING UP FROM CHAIR USING ARMS: A LOT
DRESSING REGULAR LOWER BODY CLOTHING: A LITTLE

## 2024-08-25 ASSESSMENT — PAIN SCALES - GENERAL
PAINLEVEL_OUTOF10: 0 - NO PAIN

## 2024-08-25 ASSESSMENT — PAIN - FUNCTIONAL ASSESSMENT
PAIN_FUNCTIONAL_ASSESSMENT: 0-10

## 2024-08-25 ASSESSMENT — ACTIVITIES OF DAILY LIVING (ADL): ADL_ASSISTANCE: INDEPENDENT

## 2024-08-25 NOTE — PROGRESS NOTES
Physical Therapy    Physical Therapy Evaluation & Treatment    Patient Name: Mariaelena Tavarez  MRN: [id number]  Today's Date: 8/25/2024   Time Calculation  Start Time: 1047  Stop Time: 1126  Time Calculation (min): 39 min    Assessment/Plan   PT Assessment  PT Assessment Results: Decreased strength, Decreased range of motion, Decreased endurance, Impaired balance, Decreased mobility, Decreased cognition, Impaired judgement, Decreased safety awareness, Impaired vision, Orthopedic restrictions, Pain  Rehab Prognosis: Good  Evaluation/Treatment Tolerance: Patient limited by fatigue  Medical Staff Made Aware: Yes  Strengths: Support of Caregivers  End of Session Communication: Bedside nurse  End of Session Patient Position: Bed, 2 rail up, Alarm on (Multiple family members presents)   IP OR SWING BED PT PLAN  Inpatient or Swing Bed: Inpatient  PT Plan  Treatment/Interventions: Bed mobility, Transfer training, Gait training, Balance training, Neuromuscular re-education, Strengthening, Endurance training, Range of motion, Therapeutic exercise, Therapeutic activity, Home exercise program, Positioning  PT Plan: Ongoing PT  PT Frequency: 4 times per week  PT Discharge Recommendations: Moderate intensity level of continued care, 24 hr supervision due to cognition  PT Recommended Transfer Status: Assist x2  PT - OK to Discharge: Yes  Pt with fair tolerance to evaluation and treatment. Pt required MaxA/dependent assistance throughout treatment today. Pt complain of significant dizziness with positional changes that did not fully go away until patient return to bed; tolerable throughout session after staying in position for a few minutes with verbal cues  to take deep breathes. 02 levels stayed WFL. Throughout session, pt c/o fatigue and would have eyes closed. Pt to benefit from skilled PT throughout stay and recommend moderate level of continued care with 24/7 supervision.     Subjective     General Visit  Information:  General  Reason for Referral: Fall; initial encounter  Referred By: Dr. Bronson  Past Medical History Relevant to Rehab: B ROSHNI, CAD, Osteoathritis, Dementia, (Rhabdomyolysis)  Missed Visit: Yes  Missed Visit Reason: Patient in a medical procedure  Family/Caregiver Present: Yes (Daughter and Niece present)  Prior to Session Communication: Bedside nurse  Patient Position Received: Bed, 3 rail up, Alarm on  Preferred Learning Style: verbal  General Comment: Pt cleared to be seen by nsg. Pt agreeable to therapy; has not gotten out of bed since admission. Family is present. -  Home Living:  Home Living  Type of Home: Condo  Lives With: Alone  Home Adaptive Equipment: Walker rolling or standard, Other (Comment) (Rollator)  Home Layout: One level, Full bath main level  Home Access: Stairs to enter with rails (1 step from garage)  Entrance Stairs-Number of Steps: 1  Bathroom Shower/Tub: Tub/shower unit (Pt uses walk in showe)  Bathroom Toilet:  (raised toilet)  Bathroom Equipment: Grab bars in shower, Built-in shower seat  Prior Level of Function:  Prior Function Per Pt/Caregiver Report  Level of [place]: Independent with ADLs and functional transfers, Independent with homemaking with ambulation, Needs assistance with homemaking  Receives Help From: Family, Friends, Neighbor  ADL Assistance: Independent  Homemaking Assistance: Independent (Assistance with groceries and making bed)  Ambulatory Assistance: Independent  Prior Function Comments: Pt attends senior center classes 2x a week, walks with neighbor/friend for 20min daily, and goes out to lunch a few times a month. Pt does not drive.  Precautions:  Precautions  LE Weight Bearing Status: Other (Comment) (Full weight bearing)  Medical Precautions: Fall precautions  Post-Surgical Precautions: Right hip precautions  Braces Applied: knee immobilizer  Vital Signs:  Vital Signs  Heart Rate: 65  SpO2: 98 %  Patient Position:  (Pt 02 levels stayed level at  98% throughout treatment)    Objective   Pain:  Pain Assessment  Pain Assessment: 0-10  0-10 (Numeric) Pain Score: 0 - No pain  Pain Interventions: Repositioned, Ambulation/increased activity  Cognition:  Cognition  Overall Cognitive Status: Impaired (pt has Dementia)  Orientation Level: Disoriented to situation (forgetful)  Memory: Exceptions to WFL  Safety/Judgement: Exceptions to WFL    General Assessments:     Strength:    BLE not formally assessed   Observed through functional mobility: >/=3/5   Activity Tolerance  Endurance: Tolerates less than 10 min exercise with changes in vital signs    Strength  Strength Comments: Impaired/Decreased  Static Sitting Balance  Static Sitting-Balance Support: Bilateral upper extremity supported  Static Sitting-Level of Assistance: Moderate assistance  Static Sitting-Comment/Number of Minutes: Pt sat at EOB ~5min with ModA support    Static Standing Balance  Static Standing-Balance Support: Bilateral upper extremity supported  Static Standing-Level of Assistance: Maximum assistance (x2)  Static Standing-Comment/Number of Minutes: Pt stood next to bed for ~5 min.  Dynamic Standing Balance  Dynamic Standing-Balance Support: Bilateral upper extremity supported  Dynamic Standing-Level of Assistance: Maximum assistance (x2)  Dynamic Standing-Balance:  (completed lateral weightshift/marches)  Functional Assessments:  Bed Mobility  Bed Mobility: Yes  Bed Mobility 1  Bed Mobility 1: Supine to sitting  Level of Assistance 1: Maximum assistance (Pt assisted with LE and trunk; used bed sheet to scoot patient forward to EOB.)  Bed Mobility 2  Bed Mobility  2: Sitting to supine  Level of Assistance 2: Maximum assistance, Dependent    Transfers  Transfer: Yes  Transfer 1  Transfer From 1: Bed to, Sit to, Stand to  Transfer to 1: Bed, Stand, Sit  Technique 1: Sit to stand, Stand to sit  Transfer Device 1: Walker  Transfer Level of Assistance 1: Maximum assistance    Ambulation/Gait  Training  Ambulation/Gait Training Performed: Yes  Ambulation/Gait Training 1  Surface 1: Level tile  Device 1: Rolling walker  Gait Support Devices: Knee immobilizer  Assistance 1: Maximum assistance, Maximum verbal cues  Quality of Gait 1: Narrow base of support, Diminished heel strike, Decreased step length, Shuffling gait, Forward flexed posture, Inconsistent stride length  Comments/Distance (ft) 1: Pt ambulated ~2ft forward and backward as well as sidestepped to the right; shuffling gait pattern.    Stairs  Stairs: No    Treatments:  Therapeutic Exercise  Therapeutic Exercise Performed: Yes  Therapeutic Exercise Activity 1: Supine ther ex: AP, GS, QS, LLE heel slide    Bed Mobility  Bed Mobility: Yes  Bed Mobility 1  Bed Mobility 1: Supine to sitting  Level of Assistance 1: Maximum assistance (Pt assisted with LE and trunk; used bed sheet to scoot patient forward to EOB.)  Bed Mobility 2  Bed Mobility  2: Sitting to supine  Level of Assistance 2: Maximum assistance, Dependent    Ambulation/Gait Training  Ambulation/Gait Training Performed: Yes  Ambulation/Gait Training 1  Surface 1: Level tile  Device 1: Rolling walker  Gait Support Devices: Knee immobilizer  Assistance 1: Maximum assistance, Maximum verbal cues  Quality of Gait 1: Narrow base of support, Diminished heel strike, Decreased step length, Shuffling gait, Forward flexed posture, Inconsistent stride length  Comments/Distance (ft) 1: Pt ambulated ~2ft forward and backward as well as sidestepped to the right; shuffling gait pattern.  Transfers  Transfer: Yes  Transfer 1  Transfer From 1: Bed to, Sit to, Stand to  Transfer to 1: Bed, Stand, Sit  Technique 1: Sit to stand, Stand to sit  Transfer Device 1: Walker  Transfer Level of Assistance 1: Maximum assistance    Outcome Measures:  [facility] Basic Mobility  Turning from your back to your side while in a flat bed without using bedrails: A lot  Moving from lying on your back to sitting on the side of a flat  bed without using bedrails: A lot  Moving to and from bed to chair (including a wheelchair): A lot  Standing up from a chair using your arms (e.g. wheelchair or bedside chair): A lot  To walk in hospital room: Total  Climbing 3-5 steps with railing: Total  Basic Mobility - Total Score: 10    Encounter Problems       Encounter Problems (Active)       Balance       LTG - Patient will maintain balance to allow for safe mobility       Start:  08/25/24    Expected End:  09/08/24            STG - Maintains dynamic standing balance with upper extremity support       Start:  08/25/24    Expected End:  09/08/24       INTERVENTIONS:  1. Practice standing with minimal support.  2. Educate patient about standing tolerance.  3. Educate patient about independence with gait, transfers, and ADL's.  4. Educate patient about use of assistive device.  5. Educate patient about self-directed care.         STG - Maintains dynamic sitting balance without upper extremity support       Start:  08/25/24    Expected End:  09/08/24       INTERVENTIONS:  1. Practice sitting on the edge of a bed/mat with minimal support.  2. Educate patient about maintining total hip precautions while maintaining balance.  3. Educate patient about pressure relief.  4. Educate patient about use of assistive device.            Mobility       STG - Patient will ambulate       Start:  08/25/24    Expected End:  09/08/24               PT Transfers       LTG - Patient will transfer from one surface to another       Start:  08/25/24    Expected End:  09/08/24            LTG - Patient will demonstrate safe transfer techniques       Start:  08/25/24    Expected End:  09/08/24               Pain          Safety       LTG - Patient will adhere to hip precautions during ADL's and transfers       Start:  08/25/24    Expected End:  09/08/24                   Education Documentation  Precautions, taught by Christie Ferrara, PT at 8/25/2024 12:33 PM.  Learner: Family,  Patient  Readiness: Acceptance  Method: Explanation  Response: Verbalizes Understanding, Needs Reinforcement    Body Mechanics, taught by Christie Ferrara, PT at 8/25/2024 12:33 PM.  Learner: Family, Patient  Readiness: Acceptance  Method: Explanation  Response: Verbalizes Understanding, Needs Reinforcement    Home Exercise Program, taught by Christie Ferrara, PT at 8/25/2024 12:33 PM.  Learner: Family, Patient  Readiness: Acceptance  Method: Explanation  Response: Verbalizes Understanding, Needs Reinforcement    Mobility Training, taught by Christie Ferrara, PT at 8/25/2024 12:33 PM.  Learner: Family, Patient  Readiness: Acceptance  Method: Explanation  Response: Verbalizes Understanding, Needs Reinforcement    Education Comments  No comments found.

## 2024-08-25 NOTE — PROGRESS NOTES
Mariaelena Tavarez is a 89 y.o. female on day 2 of admission presenting with Fall, initial encounter.      Subjective   Patient seen and examined. Resting in bed. Family at the bedside. States pain well controlled. No complaints. Afebrile.        Objective     Last Recorded Vitals  /69 (BP Location: Left arm, Patient Position: Lying)   Pulse 65   Temp 36.6 °C (97.9 °F) (Temporal)   Resp 15   Wt (!) 39.5 kg (87 lb)   SpO2 100%   Intake/Output last 3 Shifts:    Intake/Output Summary (Last 24 hours) at 8/25/2024 0820  Last data filed at 8/25/2024 0500  Gross per 24 hour   Intake 3373.33 ml   Output 4700 ml   Net -1326.67 ml       Admission Weight  Weight: (!) 39.5 kg (87 lb) (08/23/24 1227)    Daily Weight  08/23/24 : (!) 39.5 kg (87 lb)    Image Results    No new imaging to review.     Physical Exam    General: Alert, pleasant, in no acute distress.   Cardiac: Regular rate and rhythm, S1/S2 , no murmur.   Pulmonary: Clear to auscultation on 2L NC.    Abdomen: Soft, round, nontender. BS +x4.   Extremities: No edema. RLE immobilizer in place.   Skin: No rashes or lesions.    : [place] with clear, yellow urine.    Relevant Results    Scheduled medications  amLODIPine, 10 mg, oral, Daily  aspirin, 81 mg, oral, Daily  atenolol, 25 mg, oral, BID  cholecalciferol, 2,000 Units, oral, Daily  cyanocobalamin, 1,000 mcg, oral, Daily  donepezil, 10 mg, oral, Nightly  escitalopram, 5 mg, oral, Daily  gabapentin, 100 mg, oral, q AM  gabapentin, 200 mg, oral, Nightly  heparin (porcine), 5,000 Units, subcutaneous, BID  levothyroxine, 25 mcg, oral, Daily before breakfast  pantoprazole, 40 mg, oral, Daily before breakfast   Or  pantoprazole, 40 mg, intravenous, Daily before breakfast  potassium chloride CR, 40 mEq, oral, Once  [Held by provider] rosuvastatin, 40 mg, oral, Nightly      Continuous medications  oxygen, , Last Rate: 2 L/min (08/23/24 1345)  sodium chloride 0.9%, 100 mL/hr, Last Rate: 100 mL/hr (08/25/24 8909)      PRN  medications  PRN medications: acetaminophen **OR** acetaminophen **OR** acetaminophen, benzocaine-menthol, bisacodyl, bisacodyl, dextromethorphan-guaifenesin, ondansetron ODT **OR** ondansetron, polyethylene glycol, prochlorperazine **OR** prochlorperazine **OR** prochlorperazine, traMADol     Results for orders placed or performed during the hospital encounter of 08/23/24 (from the past 24 hour(s))   CBC   Result Value Ref Range    WBC 8.2 4.4 - 11.3 x10*3/uL    nRBC 0.0 0.0 - 0.0 /100 WBCs    RBC 3.91 (L) 4.00 - 5.20 x10*6/uL    Hemoglobin 12.3 12.0 - 16.0 g/dL    Hematocrit 37.1 36.0 - 46.0 %    MCV 95 80 - 100 fL    MCH 31.5 26.0 - 34.0 pg    MCHC 33.2 32.0 - 36.0 g/dL    RDW 12.5 11.5 - 14.5 %    Platelets 168 150 - 450 x10*3/uL   Creatine Kinase   Result Value Ref Range    Creatine Kinase 1,245 (H) 24 - 195 U/L   Basic Metabolic Panel   Result Value Ref Range    Glucose 108 (H) 65 - 99 mg/dL    Sodium      Potassium 3.1 (L) 3.4 - 5.1 mmol/L    Chloride      Bicarbonate 25 24 - 31 mmol/L    Urea Nitrogen      Creatinine 0.50 0.40 - 1.60 mg/dL    eGFR 90 >60 mL/min/1.73m*2    Calcium      Anion Gap               Assessment/Plan      Fall with R Hip Dislocation  -Ortho follows.   -POD #1 closed reduction.   -Immobilizer in place, weight bearing and activity restrictions per ortho.   -PT/OT tyler today, anticipate she will need SNF.   -Pain control.      Rhabdomyolysis  -CK 3,541 on admission, down to 1,245 this AM.  -Continue IVF hydration. Awaiting AM BMP to see if adjustments needed.    -Monitor CK levels.   -Holding statin.   -Monitor renal function closely, currently stable.      HTN  -Continue norvasc and BB.   -BP currently stable, monitor.      CAD  -Continue core meds.   -Denies any chest pain.      Hypothyroidism  -Continue Synthroid.      Dementia  -Continue Aricept.   -Supportive care.      DVT Risk  -Heparin subcutaneous.      Plan   Ortho follows. S/P closed reduction POD #1.   Continue with pain  control, immoblizer. PT/OT evals today.   Continue IVF hydration for rhabdomyolysis. Monitor CK levels, improving.  Awaiting AM BMP to finalize and will adjust fluids if needed.   Anticipate patient will need SNF. SS consulted for DC planning.         Sandy Ansari, SHANIKA-CNP

## 2024-08-25 NOTE — CARE PLAN
The patient's goals for the shift include      The clinical goals for the shift include safety, monitor labs and vitals. pain control      Problem: Skin  Goal: Decreased wound size/increased tissue granulation at next dressing change  Outcome: Progressing  Goal: Participates in plan/prevention/treatment measures  Outcome: Progressing  Goal: Prevent/manage excess moisture  Outcome: Progressing  Goal: Prevent/minimize sheer/friction injuries  Outcome: Progressing  Goal: Promote/optimize nutrition  Outcome: Progressing  Goal: Promote skin healing  Outcome: Progressing     Problem: Safety - Adult  Goal: Free from fall injury  Outcome: Progressing     Problem: Discharge Planning  Goal: Discharge to home or other facility with appropriate resources  Outcome: Progressing     Problem: Chronic Conditions and Co-morbidities  Goal: Patient's chronic conditions and co-morbidity symptoms are monitored and maintained or improved  Outcome: Progressing     Problem: Pain  Goal: Takes deep breaths with improved pain control throughout the shift  Outcome: Progressing  Goal: Turns in bed with improved pain control throughout the shift  Outcome: Progressing  Goal: Walks with improved pain control throughout the shift  Outcome: Progressing  Goal: Performs ADL's with improved pain control throughout shift  Outcome: Progressing  Goal: Participates in PT with improved pain control throughout the shift  Outcome: Progressing  Goal: Free from opioid side effects throughout the shift  Outcome: Progressing  Goal: Free from acute confusion related to pain meds throughout the shift  Outcome: Progressing     Problem: Deep Vein Thrombosis  Goal: I will remain free from complications of deep vein thrombosis and maintain current level of mobility  Outcome: Progressing     Problem: Nutrition  Goal: Oral intake greater 75%  Outcome: Progressing  Goal: Nutrition support goals are met within 48 hrs  Outcome: Progressing     Problem: Fall/Injury  Goal: Not  fall by end of shift  Outcome: Progressing  Goal: Be free from injury by end of the shift  Outcome: Progressing  Goal: Verbalize understanding of personal risk factors for fall in the hospital  Outcome: Progressing  Goal: Verbalize understanding of risk factor reduction measures to prevent injury from fall in the home  Outcome: Progressing  Goal: Use assistive devices by end of the shift  Outcome: Progressing  Goal: Pace activities to prevent fatigue by end of the shift  Outcome: Progressing

## 2024-08-25 NOTE — CARE PLAN
Problem: Skin  Goal: Decreased wound size/increased tissue granulation at next dressing change  Outcome: Progressing  Flowsheets (Taken 8/24/2024 2153)  Decreased wound size/increased tissue granulation at next dressing change:   Promote sleep for wound healing   Utilize specialty bed per algorithm  Goal: Participates in plan/prevention/treatment measures  Outcome: Progressing  Flowsheets (Taken 8/24/2024 2153)  Participates in plan/prevention/treatment measures: Elevate heels  Goal: Prevent/manage excess moisture  Outcome: Progressing  Flowsheets (Taken 8/24/2024 2153)  Prevent/manage excess moisture:   Cleanse incontinence/protect with barrier cream   Monitor for/manage infection if present  Goal: Prevent/minimize sheer/friction injuries  Outcome: Progressing  Flowsheets (Taken 8/24/2024 2153)  Prevent/minimize sheer/friction injuries:   Use pull sheet   HOB 30 degrees or less   Utilize specialty bed per algorithm  Goal: Promote/optimize nutrition  Outcome: Progressing  Flowsheets (Taken 8/24/2024 2153)  Promote/optimize nutrition:   Monitor/record intake including meals   Consume > 50% meals/supplements   Offer water/supplements/favorite foods  Goal: Promote skin healing  Outcome: Progressing  Flowsheets (Taken 8/24/2024 2153)  Promote skin healing: Protective dressings over bony prominences     Problem: Safety - Adult  Goal: Free from fall injury  Outcome: Progressing     Problem: Discharge Planning  Goal: Discharge to home or other facility with appropriate resources  Outcome: Progressing     Problem: Chronic Conditions and Co-morbidities  Goal: Patient's chronic conditions and co-morbidity symptoms are monitored and maintained or improved  Outcome: Progressing     Problem: Pain  Goal: Takes deep breaths with improved pain control throughout the shift  Outcome: Progressing  Goal: Turns in bed with improved pain control throughout the shift  Outcome: Progressing  Goal: Performs ADL's with improved pain control  throughout shift  Outcome: Progressing  Goal: Free from opioid side effects throughout the shift  Outcome: Progressing  Goal: Free from acute confusion related to pain meds throughout the shift  Outcome: Progressing     Problem: Deep Vein Thrombosis  Goal: I will remain free from complications of deep vein thrombosis and maintain current level of mobility  Outcome: Progressing     Problem: Nutrition  Goal: Oral intake greater 75%  Outcome: Progressing  Goal: Nutrition support goals are met within 48 hrs  Outcome: Progressing     Problem: Fall/Injury  Goal: Not fall by end of shift  Outcome: Progressing  Goal: Be free from injury by end of the shift  Outcome: Progressing  Goal: Verbalize understanding of personal risk factors for fall in the hospital  Outcome: Progressing  Goal: Verbalize understanding of risk factor reduction measures to prevent injury from fall in the home  Outcome: Progressing  Goal: Use assistive devices by end of the shift  Outcome: Progressing  Goal: Pace activities to prevent fatigue by end of the shift  Outcome: Progressing   The patient's goals for the shift include  rest    The clinical goals for the shift include safety, monitor labs and vitals, pain control, promote rest      08/24/24 at 9:55 PM - Yaquelin Liang RN

## 2024-08-25 NOTE — PROGRESS NOTES
Mariaelena Tavarez is a 89 y.o. female on day 2 of admission presenting with Fall, initial encounter.    Subjective   Patient in bed.  Attempted therapy today.  She is able to stand.  Hip feels like it is reduced to her.  Denies hip pain.  Denies other pain today.  Family at the bedside       Objective     Physical Exam  Alert and oriented x 3  Right lower extremity: Knee immobilizer intact.  Minimal swelling around the thigh.  No tenderness over the lateral aspect.  While supine the leg lengths appear equal.  Tolerates hip logroll exam.    Last Recorded Vitals  Blood pressure 128/60, pulse 63, temperature 36.2 °C (97.2 °F), temperature source Temporal, resp. rate 16, height 1.524 m (5'), weight (!) 39.5 kg (87 lb), SpO2 100%.      Relevant Results      Results for orders placed or performed during the hospital encounter of 08/23/24 (from the past 24 hour(s))   CBC   Result Value Ref Range    WBC 8.2 4.4 - 11.3 x10*3/uL    nRBC 0.0 0.0 - 0.0 /100 WBCs    RBC 3.91 (L) 4.00 - 5.20 x10*6/uL    Hemoglobin 12.3 12.0 - 16.0 g/dL    Hematocrit 37.1 36.0 - 46.0 %    MCV 95 80 - 100 fL    MCH 31.5 26.0 - 34.0 pg    MCHC 33.2 32.0 - 36.0 g/dL    RDW 12.5 11.5 - 14.5 %    Platelets 168 150 - 450 x10*3/uL   Creatine Kinase   Result Value Ref Range    Creatine Kinase 1,245 (H) 24 - 195 U/L   Basic Metabolic Panel   Result Value Ref Range    Glucose 111 (H) 65 - 99 mg/dL    Sodium 137 133 - 145 mmol/L    Potassium 3.1 (L) 3.4 - 5.1 mmol/L    Chloride 104 97 - 107 mmol/L    Bicarbonate 25 24 - 31 mmol/L    Urea Nitrogen 7 (L) 8 - 25 mg/dL    Creatinine 0.50 0.40 - 1.60 mg/dL    eGFR 90 >60 mL/min/1.73m*2    Calcium 8.5 8.5 - 10.4 mg/dL    Anion Gap 8 <=19 mmol/L   Magnesium   Result Value Ref Range    Magnesium 2.10 1.60 - 3.10 mg/dL       Assessment/Plan   Principal Problem:    Fall, initial encounter  Active Problems:    Memory loss    Coronary artery disease    Hypertension    Hypothyroid    Hip dislocation, right, initial encounter  (Multi)    Rhabdomyolysis    Dementia (Multi)    Cataract present      Problem List:  Right prosthetic hip dislocation: PT OT eval weightbearing as tolerated posterior hip precautions.  Will likely need rehab.  Okay for discharge when arrangements are made.  Follow-up with me in 4 weeks.  Continue knee immobilizer while in bed and at nighttime.  Okay to discontinue for therapy or walking.          Luiz Mccray MD

## 2024-08-26 VITALS
OXYGEN SATURATION: 99 % | TEMPERATURE: 97.7 F | BODY MASS INDEX: 17.08 KG/M2 | SYSTOLIC BLOOD PRESSURE: 119 MMHG | HEIGHT: 60 IN | HEART RATE: 65 BPM | RESPIRATION RATE: 16 BRPM | DIASTOLIC BLOOD PRESSURE: 47 MMHG | WEIGHT: 87 LBS

## 2024-08-26 LAB
ANION GAP SERPL CALC-SCNC: 9 MMOL/L
BUN SERPL-MCNC: 6 MG/DL (ref 8–25)
CALCIUM SERPL-MCNC: 8.6 MG/DL (ref 8.5–10.4)
CHLORIDE SERPL-SCNC: 108 MMOL/L (ref 97–107)
CK SERPL-CCNC: 404 U/L (ref 24–195)
CO2 SERPL-SCNC: 24 MMOL/L (ref 24–31)
CREAT SERPL-MCNC: 0.5 MG/DL (ref 0.4–1.6)
EGFRCR SERPLBLD CKD-EPI 2021: 90 ML/MIN/1.73M*2
ERYTHROCYTE [DISTWIDTH] IN BLOOD BY AUTOMATED COUNT: 12.4 % (ref 11.5–14.5)
GLUCOSE SERPL-MCNC: 96 MG/DL (ref 65–99)
HCT VFR BLD AUTO: 30.4 % (ref 36–46)
HGB BLD-MCNC: 10 G/DL (ref 12–16)
MCH RBC QN AUTO: 31.5 PG (ref 26–34)
MCHC RBC AUTO-ENTMCNC: 32.9 G/DL (ref 32–36)
MCV RBC AUTO: 96 FL (ref 80–100)
NRBC BLD-RTO: 0 /100 WBCS (ref 0–0)
PLATELET # BLD AUTO: 149 X10*3/UL (ref 150–450)
POTASSIUM SERPL-SCNC: 3.4 MMOL/L (ref 3.4–5.1)
RBC # BLD AUTO: 3.17 X10*6/UL (ref 4–5.2)
SODIUM SERPL-SCNC: 141 MMOL/L (ref 133–145)
WBC # BLD AUTO: 7 X10*3/UL (ref 4.4–11.3)

## 2024-08-26 PROCEDURE — 99238 HOSP IP/OBS DSCHRG MGMT 30/<: CPT | Performed by: NURSE PRACTITIONER

## 2024-08-26 PROCEDURE — 2500000001 HC RX 250 WO HCPCS SELF ADMINISTERED DRUGS (ALT 637 FOR MEDICARE OP): Performed by: ORTHOPAEDIC SURGERY

## 2024-08-26 PROCEDURE — 82374 ASSAY BLOOD CARBON DIOXIDE: CPT | Performed by: NURSE PRACTITIONER

## 2024-08-26 PROCEDURE — 97165 OT EVAL LOW COMPLEX 30 MIN: CPT | Mod: GO

## 2024-08-26 PROCEDURE — 97530 THERAPEUTIC ACTIVITIES: CPT | Mod: GO

## 2024-08-26 PROCEDURE — 2500000004 HC RX 250 GENERAL PHARMACY W/ HCPCS (ALT 636 FOR OP/ED): Performed by: NURSE PRACTITIONER

## 2024-08-26 PROCEDURE — 82550 ASSAY OF CK (CPK): CPT | Performed by: NURSE PRACTITIONER

## 2024-08-26 PROCEDURE — 97116 GAIT TRAINING THERAPY: CPT | Mod: GP,CQ

## 2024-08-26 PROCEDURE — 80048 BASIC METABOLIC PNL TOTAL CA: CPT | Performed by: NURSE PRACTITIONER

## 2024-08-26 PROCEDURE — 2500000004 HC RX 250 GENERAL PHARMACY W/ HCPCS (ALT 636 FOR OP/ED): Performed by: ORTHOPAEDIC SURGERY

## 2024-08-26 PROCEDURE — 2500000001 HC RX 250 WO HCPCS SELF ADMINISTERED DRUGS (ALT 637 FOR MEDICARE OP): Performed by: NURSE PRACTITIONER

## 2024-08-26 PROCEDURE — 85027 COMPLETE CBC AUTOMATED: CPT | Performed by: ORTHOPAEDIC SURGERY

## 2024-08-26 PROCEDURE — 36415 COLL VENOUS BLD VENIPUNCTURE: CPT | Performed by: NURSE PRACTITIONER

## 2024-08-26 PROCEDURE — 97110 THERAPEUTIC EXERCISES: CPT | Mod: GP,CQ

## 2024-08-26 RX ORDER — ACETAMINOPHEN 325 MG/1
650 TABLET ORAL EVERY 4 HOURS PRN
Start: 2024-08-26

## 2024-08-26 RX ORDER — TRAMADOL HYDROCHLORIDE 50 MG/1
50 TABLET ORAL EVERY 6 HOURS PRN
Qty: 12 TABLET | Refills: 0 | Status: SHIPPED | OUTPATIENT
Start: 2024-08-26

## 2024-08-26 RX ADMIN — HEPARIN SODIUM 5000 UNITS: 5000 INJECTION, SOLUTION INTRAVENOUS; SUBCUTANEOUS at 09:24

## 2024-08-26 RX ADMIN — ASPIRIN 81 MG CHEWABLE TABLET 81 MG: 81 TABLET CHEWABLE at 09:24

## 2024-08-26 RX ADMIN — SODIUM CHLORIDE 100 ML/HR: 900 INJECTION, SOLUTION INTRAVENOUS at 05:35

## 2024-08-26 RX ADMIN — PANTOPRAZOLE SODIUM 40 MG: 40 TABLET, DELAYED RELEASE ORAL at 05:34

## 2024-08-26 RX ADMIN — ESCITALOPRAM OXALATE 5 MG: 10 TABLET ORAL at 09:25

## 2024-08-26 RX ADMIN — CYANOCOBALAMIN TAB 1000 MCG 1000 MCG: 1000 TAB at 09:24

## 2024-08-26 RX ADMIN — LEVOTHYROXINE SODIUM 25 MCG: 0.03 TABLET ORAL at 05:34

## 2024-08-26 RX ADMIN — GABAPENTIN 100 MG: 100 CAPSULE ORAL at 09:24

## 2024-08-26 RX ADMIN — ATENOLOL 25 MG: 25 TABLET ORAL at 09:24

## 2024-08-26 RX ADMIN — AMLODIPINE BESYLATE 10 MG: 10 TABLET ORAL at 09:24

## 2024-08-26 RX ADMIN — Medication 2000 UNITS: at 09:24

## 2024-08-26 SDOH — ECONOMIC STABILITY: FOOD INSECURITY: WITHIN THE PAST 12 MONTHS, THE FOOD YOU BOUGHT JUST DIDN'T LAST AND YOU DIDN'T HAVE MONEY TO GET MORE.: NEVER TRUE

## 2024-08-26 SDOH — SOCIAL STABILITY: SOCIAL INSECURITY: WITHIN THE LAST YEAR, HAVE YOU BEEN AFRAID OF YOUR PARTNER OR EX-PARTNER?: NO

## 2024-08-26 SDOH — HEALTH STABILITY: PHYSICAL HEALTH
HOW OFTEN DO YOU NEED TO HAVE SOMEONE HELP YOU WHEN YOU READ INSTRUCTIONS, PAMPHLETS, OR OTHER WRITTEN MATERIAL FROM YOUR DOCTOR OR PHARMACY?: NEVER

## 2024-08-26 SDOH — ECONOMIC STABILITY: INCOME INSECURITY: IN THE LAST 12 MONTHS, WAS THERE A TIME WHEN YOU WERE NOT ABLE TO PAY THE MORTGAGE OR RENT ON TIME?: NO

## 2024-08-26 SDOH — ECONOMIC STABILITY: HOUSING INSECURITY: IN THE LAST 12 MONTHS, WAS THERE A TIME WHEN YOU WERE NOT ABLE TO PAY THE MORTGAGE OR RENT ON TIME?: NO

## 2024-08-26 SDOH — SOCIAL STABILITY: SOCIAL INSECURITY
WITHIN THE LAST YEAR, HAVE YOU BEEN KICKED, HIT, SLAPPED, OR OTHERWISE PHYSICALLY HURT BY YOUR PARTNER OR EX-PARTNER?: NO

## 2024-08-26 SDOH — ECONOMIC STABILITY: TRANSPORTATION INSECURITY: IN THE PAST 12 MONTHS, HAS LACK OF TRANSPORTATION KEPT YOU FROM MEDICAL APPOINTMENTS OR FROM GETTING MEDICATIONS?: NO

## 2024-08-26 SDOH — SOCIAL STABILITY: SOCIAL INSECURITY: WITHIN THE LAST YEAR, HAVE YOU BEEN HUMILIATED OR EMOTIONALLY ABUSED IN OTHER WAYS BY YOUR PARTNER OR EX-PARTNER?: NO

## 2024-08-26 SDOH — ECONOMIC STABILITY: INCOME INSECURITY: IN THE PAST 12 MONTHS, HAS THE ELECTRIC, GAS, OIL, OR WATER COMPANY THREATENED TO SHUT OFF SERVICE IN YOUR HOME?: NO

## 2024-08-26 SDOH — ECONOMIC STABILITY: FOOD INSECURITY: WITHIN THE PAST 12 MONTHS, YOU WORRIED THAT YOUR FOOD WOULD RUN OUT BEFORE YOU GOT THE MONEY TO BUY MORE.: NEVER TRUE

## 2024-08-26 SDOH — ECONOMIC STABILITY: HOUSING INSECURITY: AT ANY TIME IN THE PAST 12 MONTHS, WERE YOU HOMELESS OR LIVING IN A SHELTER (INCLUDING NOW)?: NO

## 2024-08-26 SDOH — ECONOMIC STABILITY: INCOME INSECURITY: IN THE PAST 12 MONTHS HAS THE ELECTRIC, GAS, OIL, OR WATER COMPANY THREATENED TO SHUT OFF SERVICES IN YOUR HOME?: NO

## 2024-08-26 SDOH — ECONOMIC STABILITY: INCOME INSECURITY: HOW HARD IS IT FOR YOU TO PAY FOR THE VERY BASICS LIKE FOOD, HOUSING, MEDICAL CARE, AND HEATING?: NOT HARD AT ALL

## 2024-08-26 SDOH — ECONOMIC STABILITY: FOOD INSECURITY: WITHIN THE PAST 12 MONTHS, YOU WORRIED THAT YOUR FOOD WOULD RUN OUT BEFORE YOU GOT MONEY TO BUY MORE.: NEVER TRUE

## 2024-08-26 SDOH — ECONOMIC STABILITY: TRANSPORTATION INSECURITY
IN THE PAST 12 MONTHS, HAS LACK OF TRANSPORTATION KEPT YOU FROM MEETINGS, WORK, OR FROM GETTING THINGS NEEDED FOR DAILY LIVING?: NO

## 2024-08-26 SDOH — SOCIAL STABILITY: SOCIAL INSECURITY
WITHIN THE LAST YEAR, HAVE YOU BEEN RAPED OR FORCED TO HAVE ANY KIND OF SEXUAL ACTIVITY BY YOUR PARTNER OR EX-PARTNER?: NO

## 2024-08-26 SDOH — ECONOMIC STABILITY: TRANSPORTATION INSECURITY
IN THE PAST 12 MONTHS, HAS THE LACK OF TRANSPORTATION KEPT YOU FROM MEDICAL APPOINTMENTS OR FROM GETTING MEDICATIONS?: NO

## 2024-08-26 SDOH — SOCIAL STABILITY: SOCIAL INSECURITY
WITHIN THE LAST YEAR, HAVE TO BEEN RAPED OR FORCED TO HAVE ANY KIND OF SEXUAL ACTIVITY BY YOUR PARTNER OR EX-PARTNER?: NO

## 2024-08-26 SDOH — ECONOMIC STABILITY: FOOD INSECURITY: HOW HARD IS IT FOR YOU TO PAY FOR THE VERY BASICS LIKE FOOD, HOUSING, MEDICAL CARE, AND HEATING?: NOT HARD AT ALL

## 2024-08-26 SDOH — ECONOMIC STABILITY: HOUSING INSECURITY: IN THE PAST 12 MONTHS, HOW MANY TIMES HAVE YOU MOVED WHERE YOU WERE LIVING?: 0

## 2024-08-26 ASSESSMENT — PAIN - FUNCTIONAL ASSESSMENT
PAIN_FUNCTIONAL_ASSESSMENT: FLACC (FACE, LEGS, ACTIVITY, CRY, CONSOLABILITY)
PAIN_FUNCTIONAL_ASSESSMENT: 0-10
PAIN_FUNCTIONAL_ASSESSMENT: 0-10
PAIN_FUNCTIONAL_ASSESSMENT: FLACC (FACE, LEGS, ACTIVITY, CRY, CONSOLABILITY)

## 2024-08-26 ASSESSMENT — COGNITIVE AND FUNCTIONAL STATUS - GENERAL
MOBILITY SCORE: 11
DRESSING REGULAR LOWER BODY CLOTHING: A LOT
DRESSING REGULAR UPPER BODY CLOTHING: A LOT
WALKING IN HOSPITAL ROOM: A LOT
WALKING IN HOSPITAL ROOM: TOTAL
DAILY ACTIVITIY SCORE: 15
PERSONAL GROOMING: A LITTLE
TURNING FROM BACK TO SIDE WHILE IN FLAT BAD: A LOT
DRESSING REGULAR LOWER BODY CLOTHING: A LITTLE
EATING MEALS: A LITTLE
MOVING TO AND FROM BED TO CHAIR: A LOT
CLIMB 3 TO 5 STEPS WITH RAILING: TOTAL
STANDING UP FROM CHAIR USING ARMS: A LOT
HELP NEEDED FOR BATHING: A LOT
CLIMB 3 TO 5 STEPS WITH RAILING: TOTAL
PERSONAL GROOMING: A LITTLE
MOVING FROM LYING ON BACK TO SITTING ON SIDE OF FLAT BED WITH BEDRAILS: A LITTLE
STANDING UP FROM CHAIR USING ARMS: A LOT
EATING MEALS: A LITTLE
DRESSING REGULAR UPPER BODY CLOTHING: A LITTLE
TURNING FROM BACK TO SIDE WHILE IN FLAT BAD: A LOT
TOILETING: A LOT
MOVING TO AND FROM BED TO CHAIR: A LOT
TOILETING: A LOT
DAILY ACTIVITIY SCORE: 15
HELP NEEDED FOR BATHING: A LOT
MOBILITY SCORE: 11
MOVING FROM LYING ON BACK TO SITTING ON SIDE OF FLAT BED WITH BEDRAILS: A LOT

## 2024-08-26 ASSESSMENT — PAIN SCALES - GENERAL
PAINLEVEL_OUTOF10: 5 - MODERATE PAIN
PAINLEVEL_OUTOF10: 0 - NO PAIN
PAINLEVEL_OUTOF10: 0 - NO PAIN
PAINLEVEL_OUTOF10: 5 - MODERATE PAIN
PAINLEVEL_OUTOF10: 0 - NO PAIN

## 2024-08-26 ASSESSMENT — ACTIVITIES OF DAILY LIVING (ADL)
ADL_ASSISTANCE: INDEPENDENT
BATHING_ASSISTANCE: MAXIMAL
LACK_OF_TRANSPORTATION: NO

## 2024-08-26 ASSESSMENT — PAIN DESCRIPTION - DESCRIPTORS: DESCRIPTORS: ACHING

## 2024-08-26 NOTE — DISCHARGE SUMMARY
Discharge Diagnosis  Fall, initial encounter    Issues Requiring Follow-Up  Hip dislocation    Discharge Meds     Your medication list        START taking these medications        Instructions Last Dose Given Next Dose Due   acetaminophen 325 mg tablet  Commonly known as: Tylenol      Take 2 tablets (650 mg) by mouth every 4 hours if needed for mild pain (1 - 3), headaches or fever (temp greater than 38.0 C).       traMADol 50 mg tablet  Commonly known as: Ultram      Take 1 tablet (50 mg) by mouth every 6 hours if needed for moderate pain (4 - 6).              CONTINUE taking these medications        Instructions Last Dose Given Next Dose Due   amLODIPine 10 mg tablet  Commonly known as: Norvasc      Take 1 tablet (10 mg) by mouth once daily.       aspirin 81 mg chewable tablet           atenolol 25 mg tablet  Commonly known as: Tenormin      Take 1 tablet (25 mg) by mouth 2 times a day. 25 mg 2x a day       cholecalciferol 50 MCG (2000 UT) tablet  Commonly known as: Vitamin D-3           cyanocobalamin 1,000 mcg tablet  Commonly known as: Vitamin B-12           donepezil 10 mg tablet  Commonly known as: Aricept      TAKE ONE TABLET BY MOUTH DAILY AT BEDTIME AS DIRECTED       escitalopram 5 mg tablet  Commonly known as: Lexapro      1 tablet a day       gabapentin 100 mg capsule  Commonly known as: Neurontin      TAKE 1 CAPSULE BY MOUTH IN THE MORNING AND THEN TAKE 2 CAPSULES AT NIGHT AS DIRECTED for 90       levothyroxine 25 mcg tablet  Commonly known as: Synthroid, Levoxyl      Take 1 tablet (25 mcg) by mouth once daily in the morning. Take before meals. . for 90       pantoprazole 40 mg EC tablet  Commonly known as: ProtoNix      Take 1 tablet (40 mg) by mouth once daily.       rosuvastatin 40 mg tablet  Commonly known as: Crestor      Take 1 tablet (40 mg) by mouth once daily.                 Where to Get Your Medications        You can get these medications from any pharmacy    Bring a paper prescription for  each of these medications  traMADol 50 mg tablet       Information about where to get these medications is not yet available    Ask your nurse or doctor about these medications  acetaminophen 325 mg tablet         Test Results Pending At Discharge  Pending Labs       Order Current Status    Extra Urine Adams Tube Collected (08/23/24 5056)    Urinalysis with Reflex Culture and Microscopic In process            Hospital Course  Admitted for hip dislocation s/p fall. Was evaluated by Ortho and underwent total closed reduction. Pain is well controlled. Pt to follow-up outpatient with Ortho in 4 weeks. Medically stable for discharge to SNF.      Pertinent Physical Exam At Time of Discharge  Physical Exam  HENT:      Head: Normocephalic and atraumatic.      Nose: Nose normal.      Mouth/Throat:      Mouth: Mucous membranes are moist.      Pharynx: Oropharynx is clear.   Eyes:      Extraocular Movements: Extraocular movements intact.      Pupils: Pupils are equal, round, and reactive to light.   Cardiovascular:      Rate and Rhythm: Normal rate and regular rhythm.      Pulses: Normal pulses.   Pulmonary:      Effort: Pulmonary effort is normal.      Breath sounds: Normal breath sounds.   Abdominal:      General: Abdomen is flat. Bowel sounds are normal.      Palpations: Abdomen is soft.   Musculoskeletal:         General: Normal range of motion.   Skin:     General: Skin is warm and dry.      Capillary Refill: Capillary refill takes less than 2 seconds.   Neurological:      General: No focal deficit present.      Mental Status: She is oriented to person, place, and time.   Psychiatric:         Mood and Affect: Mood normal.         Outpatient Follow-Up  No future appointments.      Jannie Clarke, APRN-CNP

## 2024-08-26 NOTE — CARE PLAN
Problem: Skin  Goal: Decreased wound size/increased tissue granulation at next dressing change  Outcome: Progressing  Flowsheets (Taken 8/26/2024 0024)  Decreased wound size/increased tissue granulation at next dressing change: Promote sleep for wound healing  Goal: Participates in plan/prevention/treatment measures  Outcome: Progressing  Flowsheets (Taken 8/26/2024 0024)  Participates in plan/prevention/treatment measures: Elevate heels  Goal: Prevent/manage excess moisture  Outcome: Progressing  Flowsheets (Taken 8/26/2024 0024)  Prevent/manage excess moisture:   Monitor for/manage infection if present   Cleanse incontinence/protect with barrier cream  Goal: Prevent/minimize sheer/friction injuries  Outcome: Progressing  Flowsheets (Taken 8/26/2024 0024)  Prevent/minimize sheer/friction injuries:   Use pull sheet   HOB 30 degrees or less  Goal: Promote/optimize nutrition  Outcome: Progressing  Flowsheets (Taken 8/26/2024 0024)  Promote/optimize nutrition:   Monitor/record intake including meals   Offer water/supplements/favorite foods   Consume > 50% meals/supplements  Goal: Promote skin healing  Outcome: Progressing  Flowsheets (Taken 8/26/2024 0024)  Promote skin healing: Protective dressings over bony prominences     Problem: Safety - Adult  Goal: Free from fall injury  Outcome: Progressing     Problem: Discharge Planning  Goal: Discharge to home or other facility with appropriate resources  Outcome: Progressing     Problem: Chronic Conditions and Co-morbidities  Goal: Patient's chronic conditions and co-morbidity symptoms are monitored and maintained or improved  Outcome: Progressing     Problem: Pain  Goal: Takes deep breaths with improved pain control throughout the shift  Outcome: Progressing  Goal: Turns in bed with improved pain control throughout the shift  Outcome: Progressing  Goal: Participates in PT with improved pain control throughout the shift  Outcome: Progressing  Goal: Free from opioid side  effects throughout the shift  Outcome: Progressing  Goal: Free from acute confusion related to pain meds throughout the shift  Outcome: Progressing     Problem: Deep Vein Thrombosis  Goal: I will remain free from complications of deep vein thrombosis and maintain current level of mobility  Outcome: Progressing     Problem: Nutrition  Goal: Oral intake greater 75%  Outcome: Progressing  Goal: Nutrition support goals are met within 48 hrs  Outcome: Progressing     Problem: Fall/Injury  Goal: Not fall by end of shift  Outcome: Progressing  Goal: Be free from injury by end of the shift  Outcome: Progressing  Goal: Verbalize understanding of personal risk factors for fall in the hospital  Outcome: Progressing  Goal: Verbalize understanding of risk factor reduction measures to prevent injury from fall in the home  Outcome: Progressing  Goal: Use assistive devices by end of the shift  Outcome: Progressing  Goal: Pace activities to prevent fatigue by end of the shift  Outcome: Progressing   The patient's goals for the shift include  rest    The clinical goals for the shift include safety, monitor labs and vitals. pain control, promote rest      08/26/24 at 12:29 AM - Yaquelin Liang RN

## 2024-08-26 NOTE — PROGRESS NOTES
Spiritual Care Visit    Clinical Encounter Type  Visited With: Patient and family together  Routine Visit: Introduction  Continue Visiting: Yes         Values/Beliefs  Spiritual Requests During Hospitalization: Mariaelena received the sacraments of anointing and Communion today    Sacramental Encounters  Communion: Patient wants communion  Communion Given Indicator: Yes  Sacrament of Sick-Anointing: Anointed     Jesus Duque

## 2024-08-26 NOTE — CARE PLAN
Problem: Skin  Goal: Decreased wound size/increased tissue granulation at next dressing change  Outcome: Progressing  Goal: Participates in plan/prevention/treatment measures  Outcome: Progressing  Goal: Prevent/manage excess moisture  Outcome: Progressing  Goal: Prevent/minimize sheer/friction injuries  Outcome: Progressing  Goal: Promote/optimize nutrition  Outcome: Progressing  Goal: Promote skin healing  Outcome: Progressing     Problem: Safety - Adult  Goal: Free from fall injury  Outcome: Progressing     Problem: Discharge Planning  Goal: Discharge to home or other facility with appropriate resources  Outcome: Progressing     Problem: Chronic Conditions and Co-morbidities  Goal: Patient's chronic conditions and co-morbidity symptoms are monitored and maintained or improved  Outcome: Progressing     Problem: Pain  Goal: Takes deep breaths with improved pain control throughout the shift  Outcome: Progressing  Goal: Turns in bed with improved pain control throughout the shift  Outcome: Progressing  Goal: Walks with improved pain control throughout the shift  Outcome: Progressing  Goal: Performs ADL's with improved pain control throughout shift  Outcome: Progressing  Goal: Participates in PT with improved pain control throughout the shift  Outcome: Progressing  Goal: Free from opioid side effects throughout the shift  Outcome: Progressing  Goal: Free from acute confusion related to pain meds throughout the shift  Outcome: Progressing     Problem: Deep Vein Thrombosis  Goal: I will remain free from complications of deep vein thrombosis and maintain current level of mobility  Outcome: Progressing     Problem: Nutrition  Goal: Oral intake greater 75%  Outcome: Progressing  Goal: Nutrition support goals are met within 48 hrs  Outcome: Progressing     Problem: Fall/Injury  Goal: Not fall by end of shift  Outcome: Progressing  Goal: Be free from injury by end of the shift  Outcome: Progressing  Goal: Verbalize  understanding of personal risk factors for fall in the hospital  Outcome: Progressing  Goal: Verbalize understanding of risk factor reduction measures to prevent injury from fall in the home  Outcome: Progressing  Goal: Use assistive devices by end of the shift  Outcome: Progressing  Goal: Pace activities to prevent fatigue by end of the shift  Outcome: Progressing     Problem: Pain  Goal: STG - Pain is manageable through therapies  Outcome: Progressing   The patient's goals for the shift include      The clinical goals for the shift include safety, monitor labs and vitals. pain control, promote rest

## 2024-08-26 NOTE — CONSULTS
Nutrition Assessement Note    Nutrition Assessment    Reason for Assessment: Admission nursing screening    Reason for Hospital Admission:  Mariaelena Tavarez is a 89 y.o. female who is admitted for fall, S/p closed reduction of R prosthetic hip dislocation. Pt states wt has been stable, feels she is eating well PTA. Family in room concerned that pt is not consuming enough protein, and is requesting supplement w/ lowest amount of sugar, as she feels sugar is contributing to dementia/ Ensure high protein has only 4 gm protein, agreeable to this    Past Medical History:   Diagnosis Date    Age-related nuclear cataract of left eye 09/03/2023    Anxiety 09/03/2023    Benign paroxysmal positional vertigo 09/03/2023    Coronary artery disease 09/03/2023    Gastroesophageal reflux disease 09/03/2023    Hip dislocation, right, initial encounter (Multi) 08/23/2024    Hyperlipidemia 09/03/2023    Osteoarthritis 09/03/2023    Unspecified cataract     Cataract of right eye, unspecified cataract type    Urinary incontinence 09/03/2023    Venous thrombosis 09/03/2023    Vitamin D deficiency 09/03/2023      Past Surgical History:   Procedure Laterality Date    CATARACT EXTRACTION  09/04/2018    Cataract Surgery    CT GUIDED IMAGING FOR NEEDLE PLACEMENT  9/27/2016    CT GUIDED IMAGING FOR NEEDLE PLACEMENT [facility]       Nutrition History:     Energy Intake: Fair 50-75 %  Food Allergies/Intolerances:  None  GI Symptoms: None  Oral Problems: None    Anthropometrics:  Ht: 152.4 cm (5'), Wt: (!) 39.5 kg (87 lb), BMI: 16.99  IBW/kg (Dietitian Calculated): 45 kg  Percent of IBW: 88 %       Weight Change:  Daily Weight  08/23/24 : (!) 39.5 kg (87 lb)  04/15/24 : (!) 39.5 kg (87 lb)  01/15/24 : (!) 39.5 kg (87 lb)  01/01/24 : (!) 39.5 kg (87 lb)  12/18/23 : (!) 38.7 kg (85 lb 4.8 oz)  06/26/23 : (!) 39.9 kg (88 lb)  08/24/22 : (!) 42.4 kg (93 lb 8 oz)  08/26/21 : (!) 43.1 kg (95 lb)  08/12/21 : (!) 42.6 kg (94 lb)  07/15/21 : (!) 44.9  kg (99 lb)        Significant Weight Loss: No        Nutrition Focused Physical Exam Findings:   Subcutaneous Fat Loss  Orbital Fat Pads: Well nourished (slightly bulging fat pads)  Buccal Fat Pads: Well nourished (full, rounded cheeks)  Triceps: Mild-Moderate (less than ample fat tissue)    Muscle Wasting  Temporalis: Mild-Moderate (slight depression)  Pectoralis (Clavicular Region): Mild-Moderate (some protrusion of clavicle)  Deltoid/Trapezius: Mild-Moderate (slight protrusion of acromion process)  Interosseous: Mild-Moderate (slightly depressed area between thumb and forefinger)    Edema  Edema: none    Physical Findings (Nutrition Deficiency/Toxicity)  Hair: Negative  Skin: Negative    Nutrition Significant Labs:  Lab Results   Component Value Date    WBC 7.0 08/26/2024    HGB 10.0 (L) 08/26/2024    HCT 30.4 (L) 08/26/2024     (L) 08/26/2024    CHOL 125 10/10/2023    TRIG 122 10/10/2023    HDL 47.8 10/10/2023    ALT 41 (H) 08/23/2024    AST 87 (H) 08/23/2024     08/26/2024    K 3.4 08/26/2024     (H) 08/26/2024    CREATININE 0.50 08/26/2024    BUN 6 (L) 08/26/2024    CO2 24 08/26/2024    TSH 3.52 10/10/2023    HGBA1C 5.1 07/06/2023     Nutrition Specific Medications:  amLODIPine, 10 mg, oral, Daily  aspirin, 81 mg, oral, Daily  atenolol, 25 mg, oral, BID  cholecalciferol, 2,000 Units, oral, Daily  cyanocobalamin, 1,000 mcg, oral, Daily  donepezil, 10 mg, oral, Nightly  escitalopram, 5 mg, oral, Daily  gabapentin, 100 mg, oral, q AM  gabapentin, 200 mg, oral, Nightly  heparin (porcine), 5,000 Units, subcutaneous, BID  levothyroxine, 25 mcg, oral, Daily before breakfast  melatonin, 3 mg, oral, Nightly  pantoprazole, 40 mg, oral, Daily before breakfast   Or  pantoprazole, 40 mg, intravenous, Daily before breakfast  [Held by provider] rosuvastatin, 40 mg, oral, Nightly         Dietary Orders (From admission, onward)       Start     Ordered    08/24/24 1200  Adult diet Regular  Diet effective now         Question:  Diet type  Answer:  Regular    08/24/24 1200                    Estimated Needs:   Estimated Energy Needs  Total Energy Estimated Needs (kCal):  (1300 min estimated needs for women)  Total Estimated Energy Need per Day (kCal/kg): 30 kCal/kg  Method for Estimating Needs: minimum needs for women    Estimated Protein Needs  Total Protein Estimated Needs (g): 46 g  Method for Estimating Needs: minimum needs for women    Estimated Fluid Needs  Total Fluid Estimated Needs (mL): 1300 mL  Total Fluid Estimated Needs (mL/kg): 1 mL/kg  Method for Estimating Needs: 1ml/kcal      Nutrition Diagnosis   Nutrition Diagnosis:  Malnutrition Diagnosis  Patient has Malnutrition Diagnosis: Yes  Diagnosis Status: New  Malnutrition Diagnosis: Mild malnutrition related to chronic disease or condition  As Evidenced by: mild fat and muscle depletion    Nutrition Diagnosis  Patient has Nutrition Diagnosis: Yes  Diagnosis Status (1): New  Nutrition Diagnosis 1: Underweight  Related to (1): intake less than estimated needs  As Evidenced by (1): BMI less than normal for age     Nutrition Interventions/Recommendations   Nutrition Interventions and Recommendations:    Nutrition Prescription:  Individualized Nutrition Prescription Provided for : 1300 calories, 46gm protien    Nutrition Interventions:   Food and/or Nutrient Delivery Interventions  Interventions: Medical food supplement  Medical Food Supplement: Commercial beverage  Goal: Ensure high protein TID, each provides 160 calories and 16gm protein    Education Documentation  No documentation found.       Nutrition Monitoring and Evaluation   Monitoring/Evaluation:   Food/Nutrient Related History Monitoring  Monitoring and Evaluation Plan: Energy intake  Energy Intake: Estimated energy intake  Criteria: Pt to consume >/=75% estimated energy needs via po intake and supplements    Body Composition/Growth/Weight History  Monitoring and Evaluation Plan: Weight  Weight: Measured  weight  Criteria: Pt will maintain wt at this time        Time Spent/Follow-up:   Follow Up  Time Spent (min): 35 minutes  Last Date of Nutrition Visit: 08/26/24  Nutrition Follow-Up Needed?: 3-5 days  Follow up Comment: 8/30/24

## 2024-08-26 NOTE — PROGRESS NOTES
08/26/24 1124   Discharge Planning   Living Arrangements Alone   Support Systems Children   Assistance Needed Patient was indeepdnent of ADLs and IADLs with the use of a walker   Type of Residence Private residence   Number of Stairs to Enter Residence 1   Number of Stairs Within Residence 0   Do you have animals or pets at home? No   Who is requesting discharge planning? Provider   Home or Post Acute Services None   Expected Discharge Disposition SNF   Does the patient need discharge transport arranged? Yes   RoundTrip coordination needed? Yes   Has discharge transport been arranged? No   Financial Resource Strain   How hard is it for you to pay for the very basics like food, housing, medical care, and heating? Not hard   Housing Stability   In the last 12 months, was there a time when you were not able to pay the mortgage or rent on time? N   In the past 12 months, how many times have you moved where you were living? 0   At any time in the past 12 months, were you homeless or living in a shelter (including now)? N   Transportation Needs   In the past 12 months, has lack of transportation kept you from medical appointments or from getting medications? no   In the past 12 months, has lack of transportation kept you from meetings, work, or from getting things needed for daily living? No   Patient Choice   Provider Choice list and CMS website (https://medicare.gov/care-compare#search) for post-acute Quality and Resource Measure Data were provided and reviewed with: Patient;Family   Patient / Family choosing to utilize agency / facility established prior to hospitalization No     MSW with patient, and her daughters, Monica and Gabbie. Monica can be reached at [phone]. She states she has POA for Healthcare and will bring in a copy this afternoon. However, patient is currently alert and oriented and her own decision maker.     Patient was living at home alone, independently, prior to admission. She denies any  barriers with access to food, medications, or paying bills. Reports family provides her transportation whenever she needs to go somewhere. Patient uses a walker in the home as needed, but does utilize a rollator when leaving the home.     Patient agrees with therapy recommendations at this time. Patient and daughter Monica are agreeable to SNF. [place] of choice provided and they are requesting 1) [facility] or 2) [facility]. Referral process and prior authorization need explained at this time. Referrals submitted to facilities above. Patient and daughter Monica request to be updated once determinations are received.     2:07 PM  [facility] is reviewing. Requested they advise so that pre-cert can be submitted. [facility] declined as they have no beds available at this time.     2:16 PM  [facility] has accepted the patient. Requested  direct pre-cert team submit for authorization. Daughter, Monica, left earlier this date and requested a call with update. Called Monica at this time and left her a message advising [facility] accepted and pre-cert is being started. No patient information provided. MSW's contact information provided should she have any questions.     2:52 PM  Received authorization for patient to go to [facility]. Medical team updated to the same.     3:50 PM  Plan is for patient to discharge today. DaughterMonica, updated to the above and agreeable to the same.     4:30 PM  Transportation is arranged at 6:00 PM per facility request. [id number] submitted via [email or web address]. Bobo jauregui and GOSIA sent to [facility] for their records. Nurse and facility updated to transport time.

## 2024-08-26 NOTE — PROGRESS NOTES
Physical Therapy    Physical Therapy Treatment    Patient Name: Mariaelena Tavarez  MRN: [id number]  Today's Date: 8/26/2024  Time Calculation  Start Time: 0800  Stop Time: 0830  Time Calculation (min): 30 min    Assessment/Plan   PT Assessment  Evaluation/Treatment Tolerance: Patient limited by fatigue  Barriers to Participation: Comorbidities  End of Session Communication: Bedside nurse  Assessment Comment: Gait 20' with RW with Min A x 2 with WBAT R LE with R knee immobilizer STS with Min A x 2 cues for hand placement  End of Session Patient Position: Bed, 3 rail up, Alarm on     PT Plan  Treatment/Interventions: Bed mobility, Transfer training, Gait training, Balance training, Neuromuscular re-education, Endurance training, Therapeutic exercise, Therapeutic activity, Home exercise program, Positioning  PT Plan: Ongoing PT  PT Frequency: 4 times per week  PT Discharge Recommendations: Moderate intensity level of continued care, 24 hr supervision due to cognition  Equipment Recommended upon Discharge: Wheeled walker  PT Recommended Transfer Status: Assist x2, Assistive device  PT - OK to Discharge: Yes      General Visit Information:   PT  Visit  PT Received On: 08/26/24  General  Reason for Referral: Impaired mobility  Referred By: Dr. Mccray  Past Medical History Relevant to Rehab: B ROSHNI, CAD, Osteoathritis, Dementia,  Family/Caregiver Present: Yes  Caregiver Feedback: several family members present, supportive  Co-Treatment: OT  Co-Treatment Reason: Partial co treat with OT for safety with mobility  Prior to Session Communication: Bedside nurse  Patient Position Received: Bed, 3 rail up, Alarm on  Preferred Learning Style: verbal, visual  General Comment: Cleared by nurse to see. Patient agreeable to therapy    Subjective   Precautions:  Precautions  LE Weight Bearing Status: Weight Bearing as Tolerated  Medical Precautions: Fall precautions  Post-Surgical Precautions: Right hip precautions  Braces Applied: knee  immobilizer  Precautions Comment: Rt knee immobilizer at all times  Vital Signs:  Vital Signs  SpO2: 97 %    Objective   Pain:  Pain Assessment  Pain Assessment: FLACC (Face, Legs, Activity, Cry, Consolability)  0-10 (Numeric) Pain Score: 5 - Moderate pain  Pain Type: Acute pain  Pain Location: Hip  Pain Orientation: Right  Pain Descriptors: Aching  Pain Interventions: Repositioned (R LE on stool)  Cognition:  Cognition  Overall Cognitive Status: Within Functional Limits  Cognition Comments: Pleasant,calm cooperative Folliw simple commands  Coordination:     Postural Control:  Postural Control  Postural Control: Impaired  Trunk Control: Impaired  Posture Comment: Forward flexed posture  Static Sitting Balance  Static Sitting-Balance Support: Bilateral upper extremity supported  Static Sitting-Level of Assistance: Moderate assistance  Static Sitting-Comment/Number of Minutes: EOB x 2 minutes  Static Standing Balance  Static Standing-Balance Support: Bilateral upper extremity supported  Static Standing-Level of Assistance: Minimum assistance (x 2)  Static Standing-Comment/Number of Minutes: Stood at bedside x 1minuted with Min A x 2  Dynamic Standing Balance  Dynamic Standing-Balance Support: Bilateral upper extremity supported  Dynamic Standing-Level of Assistance: Minimum assistance (x 2)  Dynamic Standing-Balance: Turning  Extremity/Trunk Assessments:    Activity Tolerance:  Activity Tolerance  Activity Tolerance Comments: 3-4 liters 02  Limited endurance SP02 93%  Treatments:  Therapeutic Exercise  Therapeutic Exercise Performed: Yes  Therapeutic Exercise Activity 1: B LE seated ther ex:ankle pumps, L LE LAQs, hip flexion x 10    Bed Mobility  Bed Mobility: Yes  Bed Mobility 1  Bed Mobility 1: Supine to sitting  Level of Assistance 1: Maximum assistance  Bed Mobility Comments 1: HOB elevated Max A for B LE over the EOB Draw sheet assist to EOB  Bed Mobility 2  Bed Mobility  2: Sitting to supine  Level of  Assistance 2: Moderate assistance, +2  Bed Mobility Comments 2: Assist for trunk down and B Le into bed    Ambulation/Gait Training  Ambulation/Gait Training Performed: Yes  Ambulation/Gait Training 1  Surface 1: Level tile  Device 1: Rolling walker  Gait Support Devices: Knee immobilizer  Assistance 1: Minimum assistance (x 2)  Quality of Gait 1: Narrow base of support, Decreased step length, Shuffling gait, Inconsistent stride length  Comments/Distance (ft) 1: Gait 20' x 1 with RW with Min A x 2 with small slow step to gait pattern  Ambulation/Gait Training 2  Surface 2: Level tile  Device 2: Rolling walker  Assistance 2: Minimum assistance (x 2)  Quality of Gait 2: Narrow base of support, Inconsistent stride length, Decreased step length, Shuffling gait  Comments/Distance (ft) 2: 5' x 1 with Min A x 2 from bedside chair with small slow shuffled step to gait pattern  Transfers  Transfer: Yes  Transfer 1  Transfer From 1: Bed to  Transfer to 1: Stand  Technique 1: Sit to stand, Stand to sit  Transfer Device 1: Walker  Transfer Level of Assistance 1: Minimum assistance, +2  Trials/Comments 1: Min A x 2 with cues for safe hand placement with WBAT R LE with knee immobilzer  Transfers 2  Transfer From 2: Bed to  Transfer to 2: Chair with arms  Technique 2: Stand pivot  Transfer Device 2: Walker  Transfer Level of Assistance 2: Minimum assistance, +2  Trials/Comments 2: x 1 trail STS with Min A x 2 stand pivot WBAT R LE with knee immobilizer Cues for safe hand placement  Transfers 3  Transfer From 3: Chair with arms to  Transfer to 3: Stand, Chair with arms  Technique 3: Sit to stand, Stand to sit  Transfer Device 3: Walker  Transfer Level of Assistance 3: Minimum assistance, +2  Trials/Comments 3: x 1 trial STS with cues for safe hand placement assist with walker management    Outcome Measures:  AMPAC Basic Mobility  Turning from your back to your side while in a flat bed without using bedrails: A lot  Moving from  lying on your back to sitting on the side of a flat bed without using bedrails: A lot  Moving to and from bed to chair (including a wheelchair): A lot  Standing up from a chair using your arms (e.g. wheelchair or bedside chair): A lot  To walk in hospital room: A lot  Climbing 3-5 steps with railing: Total  Basic Mobility - Total Score: 11    Education Documentation  Precautions, taught by Jazmin Diaz PTA at 8/26/2024  9:41 AM.  Learner: Patient  Readiness: Acceptance  Method: Explanation  Response: Verbalizes Understanding, Needs Reinforcement    Body Mechanics, taught by Jazmin Diaz PTA at 8/26/2024  9:41 AM.  Learner: Patient  Readiness: Acceptance  Method: Explanation  Response: Verbalizes Understanding, Needs Reinforcement    Home Exercise Program, taught by Jazmin Diaz PTA at 8/26/2024  9:41 AM.  Learner: Patient  Readiness: Acceptance  Method: Explanation  Response: Verbalizes Understanding, Needs Reinforcement    Mobility Training, taught by Jazmin Diaz PTA at 8/26/2024  9:41 AM.  Learner: Patient  Readiness: Acceptance  Method: Explanation  Response: Verbalizes Understanding, Needs Reinforcement    Education Comments  No comments found.        OP EDUCATION:       Encounter Problems       Encounter Problems (Active)       Balance       LTG - Patient will maintain balance to allow for safe mobility (Progressing)       Start:  08/25/24    Expected End:  09/08/24            STG - Maintains dynamic standing balance with upper extremity support (Progressing)       Start:  08/25/24    Expected End:  09/08/24       INTERVENTIONS:  1. Practice standing with minimal support.  2. Educate patient about standing tolerance.  3. Educate patient about independence with gait, transfers, and ADL's.  4. Educate patient about use of assistive device.  5. Educate patient about self-directed care.         STG - Maintains dynamic sitting balance without upper extremity support (Progressing)        Start:  08/25/24    Expected End:  09/08/24       INTERVENTIONS:  1. Practice sitting on the edge of a bed/mat with minimal support.  2. Educate patient about maintining total hip precautions while maintaining balance.  3. Educate patient about pressure relief.  4. Educate patient about use of assistive device.            Mobility       STG - Patient will ambulate (Progressing)       Start:  08/25/24    Expected End:  09/08/24               PT Transfers       LTG - Patient will transfer from one surface to another (Progressing)       Start:  08/25/24    Expected End:  09/08/24            LTG - Patient will demonstrate safe transfer techniques (Progressing)       Start:  08/25/24    Expected End:  09/08/24               Pain          Safety       LTG - Patient will adhere to hip precautions during ADL's and transfers (Progressing)       Start:  08/25/24    Expected End:  09/08/24

## 2024-08-26 NOTE — PROGRESS NOTES
Mariaelena Tavarez is a 89 y.o. female on day 3 of admission presenting with Fall, initial encounter.      Subjective   No acute issues. Awaiting SNF arrangements.     Objective     Last Recorded Vitals  /52 (BP Location: Right arm, Patient Position: Lying)   Pulse 65   Temp 36.5 °C (97.7 °F) (Temporal)   Resp 16   Wt (!) 39.5 kg (87 lb)   SpO2 100%   Intake/Output last 3 Shifts:    Intake/Output Summary (Last 24 hours) at 8/26/2024 1157  Last data filed at 8/26/2024 1100  Gross per 24 hour   Intake 2633.33 ml   Output 3275 ml   Net -641.67 ml       Admission Weight  Weight: (!) 39.5 kg (87 lb) (08/23/24 1227)    Daily Weight  08/23/24 : (!) 39.5 kg (87 lb)    Image Results    No new imaging to review.     Physical Exam    General: Alert, pleasant, in no acute distress.   Cardiac: Regular rate and rhythm, S1/S2 , no murmur.   Pulmonary: Clear to auscultation on 2L NC.    Abdomen: Soft, round, nontender. BS +x4.   Extremities: No edema. RLE immobilizer in place.   Skin: No rashes or lesions.    : [place] with clear, yellow urine.    Relevant Results    Results for orders placed or performed during the hospital encounter of 08/23/24 (from the past 24 hour(s))   CBC   Result Value Ref Range    WBC 7.0 4.4 - 11.3 x10*3/uL    nRBC 0.0 0.0 - 0.0 /100 WBCs    RBC 3.17 (L) 4.00 - 5.20 x10*6/uL    Hemoglobin 10.0 (L) 12.0 - 16.0 g/dL    Hematocrit 30.4 (L) 36.0 - 46.0 %    MCV 96 80 - 100 fL    MCH 31.5 26.0 - 34.0 pg    MCHC 32.9 32.0 - 36.0 g/dL    RDW 12.4 11.5 - 14.5 %    Platelets 149 (L) 150 - 450 x10*3/uL   Basic Metabolic Panel   Result Value Ref Range    Glucose 96 65 - 99 mg/dL    Sodium 141 133 - 145 mmol/L    Potassium 3.4 3.4 - 5.1 mmol/L    Chloride 108 (H) 97 - 107 mmol/L    Bicarbonate 24 24 - 31 mmol/L    Urea Nitrogen 6 (L) 8 - 25 mg/dL    Creatinine 0.50 0.40 - 1.60 mg/dL    eGFR 90 >60 mL/min/1.73m*2    Calcium 8.6 8.5 - 10.4 mg/dL    Anion Gap 9 <=19 mmol/L   Creatine Kinase   Result Value Ref Range     Creatine Kinase 404 (H) 24 - 195 U/L     Assessment/Plan      Fall with R Hip Dislocation  -S/p total closed reduction, POD #2.  -Per Ortho.  -PT/OT, will need SNF.     Rhabdomyolysis  -Resolved     HTN  -Continue norvasc and BB.   -BP currently stable, monitor.      CAD  -Continue core meds.   -Denies any chest pain.      Hypothyroidism  -Continue Synthroid.      Dementia  -Continue Aricept.   -Supportive care.      DVT Risk  -Heparin subcutaneous.      Dispo   To SNF once arrangements made.      Jannie Clarke, SHANIKA-CNP

## 2024-08-26 NOTE — PROGRESS NOTES
Occupational Therapy    Evaluation & Treatment    Patient Name: Mariaelena Tavarez  MRN: [id number]  Today's Date: 8/26/2024  Time Calculation  Start Time: 0755  Stop Time: 0823  Time Calculation (min): 28 min    Assessment  IP OT Assessment  OT Assessment: 88 y/o female s/p closed reduction Rt hip dislocation following a fall. On eval she requires increased assist for xfers, mobility, and self care d/t decreased strength, balance, activity tolerance and post op pain. Skilled OT services are required to maximize safety/IND prior to DC.  Prognosis: Good  Barriers to Discharge: Other (Comment) (lives alone, assist x1-2 for all xfers/mobility)  Evaluation/Treatment Tolerance: Patient limited by pain  Medical Staff Made Aware: Yes  End of Session Communication: Bedside nurse  End of Session Patient Position: Up in chair, Alarm on  Plan:  Treatment Interventions: ADL retraining, UE strengthening/ROM, Functional transfer training, Endurance training, Cognitive reorientation, Patient/family training, Equipment evaluation/education, Neuromuscular reeducation, Compensatory technique education  OT Frequency: 4 times per week  OT Discharge Recommendations: Moderate intensity level of continued care  Equipment Recommended upon Discharge: Wheeled walker  OT Recommended Transfer Status: Assist of 2  OT - OK to Discharge: Yes    Subjective   Current Problem:  1. Fall, initial encounter        2. Hip dislocation, right, initial encounter (Multi)  Case Request Operating Room: Hip Dislocation Total Closed Reduction    Case Request Operating Room: Hip Dislocation Total Closed Reduction      3. Traumatic rhabdomyolysis, initial encounter (CMS-[facility])          General:  General  Reason for Referral: Decreased ADLS, fall with Rt hip dislocation s/p closed reduction  Referred By: Dr. Mccray  Past Medical History Relevant to Rehab: B ROSHNI, CAD, Osteoathritis, Dementia,  Family/Caregiver Present: Yes  Caregiver Feedback: several family members present,  supportive  Co-Treatment: PT  Co-Treatment Reason: partial co-treat with PTA for safety with mobility  Prior to Session Communication: Bedside nurse  Patient Position Received: Bed, 3 rail up, Alarm on  Preferred Learning Style: verbal, visual  General Comment: Cleared by nsg, pt met in supine, agreeable to OT assessment  Precautions:  LE Weight Bearing Status: Weight Bearing as Tolerated (FWB RLE with Rt knee immobilizer at all times)  Medical Precautions: Fall precautions, Oxygen therapy device and L/min (on 3-4L O2 via NC)  Precautions Comment: Rt knee immobilizer at all times  Vital Signs:  SpO2: 97 %  Pain:  Pain Assessment  Pain Assessment: FLACC (Face, Legs, Activity, Cry, Consolability)  0-10 (Numeric) Pain Score: 5 - Moderate pain  Pain Type: Acute pain  Pain Location: Hip  Pain Orientation: Right  Pain Interventions: Repositioned    Objective   Cognition:  Overall Cognitive Status: Within Functional Limits  Cognition Comments: pleasant, calm and cooperative.  appears somewhat distant at times but follows simplified commands appropriately    Home Living:  Type of Home: Condo  Lives With: Alone  Home Adaptive Equipment: Walker rolling or standard, Other (Comment) (rollator)  Home Layout: One level, Full bath main level  Home Access: Stairs to enter with rails  Entrance Stairs-Number of Steps: 1  Bathroom Shower/Tub: Walk-in shower  Bathroom Toilet: Standard  Bathroom Equipment: Grab bars in shower, Built-in shower seat     Prior Function:  Level of [place]: Independent with ADLs and functional transfers, Independent with homemaking with ambulation, Needs assistance with homemaking  Receives Help From: Family, Friends, Neighbor  ADL Assistance: Independent  Homemaking Assistance: Needs assistance (some assist with IADLS needed)  Ambulatory Assistance: Independent  Vocational: Retired  Prior Function Comments: Pt attends senior center classes 2x a week, walks with neighbor/friend for 20min daily, and goes  out to lunch a few times a month. Pt does not drive.    ADL:  Eating Assistance: Stand by  Eating Deficit: Setup  Grooming Assistance: Stand by  Grooming Deficit: Setup  Bathing Assistance: Maximal  Bathing Deficit:  (anticipated for LB bathing)  UE Dressing Assistance: Moderate  UE Dressing Deficit: Pull around back (gown mgmt)  LE Dressing Assistance: Maximal  LE Dressing Deficit: Don/doff L sock, Don/doff R sock  Toileting Assistance with Device: Total  Toileting Deficit: Urinary Catheter    Activity Tolerance:  Endurance: Decreased tolerance for upright activites  Activity Tolerance Comments: on 3-4L O2 via NC; does not wear at baseline. endurance limited by pain levels and overall decline in function    Bed Mobility/Transfers:   Bed Mobility  Bed Mobility: Yes  Bed Mobility 1  Bed Mobility 1: Supine to sitting  Level of Assistance 1: Maximum assistance  Bed Mobility Comments 1: HOB significantly elevated. max A for LE advancement and trunk up, use of draw sheet to scoot EOB    Transfers  Transfer: Yes  Transfer 1  Transfer From 1: Bed to  Transfer to 1: Stand  Technique 1: Sit to stand, Stand to sit  Transfer Device 1: Walker  Transfer Level of Assistance 1: Minimum assistance, +2  Trials/Comments 1: cued on hand placement, LE positioning, weight shifting  Transfers 2  Transfer From 2: Bed to  Transfer to 2: Chair with arms  Technique 2: Stand pivot  Transfer Device 2: Walker  Transfer Level of Assistance 2: Minimum assistance, +2  Trials/Comments 2: min A x2 for safety, stability. cues on walker mgmt, step sequencing, body positioning prior to descent to chair  Transfers 3  Transfer From 3: Chair with arms to  Transfer to 3: Stand, Sit  Technique 3: Sit to stand, Stand to sit  Transfer Device 3: Walker  Transfer Level of Assistance 3: Minimum assistance, +2  Trials/Comments 3: cues on anterior weight shift, hand placement, walker mgmt    Functional Mobility:  Functional Mobility  Functional Mobility Performed:  Yes  Functional Mobility 1  Surface 1: Level tile  Device 1: Rolling walker  Assistance 1: Minimum assistance  Comments 1: min A x 1-2 for short household distance at bedside with FWW, intermittent chair follow for safety.  cued on step sequencing, walker mgmt, safety techniques.  demos a downward gaze and NBOS requiring instruction to correct    Sitting Balance:  Static Sitting Balance  Static Sitting-Balance Support: Feet supported, Bilateral upper extremity supported  Static Sitting-Level of Assistance: Close supervision, Contact guard  Static Sitting-Comment/Number of Minutes: sat EOB for ~4 minutes    Vision: Vision - Basic Assessment  Current Vision: Wears glasses all the time  Sensation:  Light Touch: No apparent deficits (light touch appears intact)  Strength:  Strength Comments: BUEs at least >/= 3/5, observed functionally  Coordination:  Movements are Fluid and Coordinated: No  Upper Body Coordination: WFL  Lower Body Coordination: impaired   Hand Function:  Hand Function  Gross Grasp: Functional  Coordination: Functional  Extremities: RUE   RUE : Exceptions to WFL (at least >/= 3/5, observed functionally) and LUE   LUE: Exceptions to WFL (at least >/= 3/5, observed functionally)    Outcome Measures: [facility] Daily Activity  Putting on and taking off regular lower body clothing: A lot  Bathing (including washing, rinsing, drying): A lot  Putting on and taking off regular upper body clothing: A little  Toileting, which includes using toilet, bedpan or urinal: A lot  Taking care of personal grooming such as brushing teeth: A little  Eating Meals: A little  Daily Activity - Total Score: 15      Education Documentation  Body Mechanics, taught by J Carlos Barclay OT at 8/26/2024  9:17 AM.  Learner: Patient  Readiness: Acceptance  Method: Explanation  Response: Needs Reinforcement    Precautions, taught by J Carlos Barclay OT at 8/26/2024  9:17 AM.  Learner: Patient  Readiness: Acceptance  Method: Explanation  Response:  Needs Reinforcement    Education Comments  No comments found.      Goals:   Encounter Problems       Encounter Problems (Active)       OT Goals       ADLS (Progressing)       Start:  08/26/24    Expected End:  09/09/24       Patient will complete ADLS with MIN A using AE/compensatory techniques as needed.          Functional Transfers (Progressing)       Start:  08/26/24    Expected End:  09/09/24       Patient will complete functional transfers including but not limited to: bed, chair, toilet with MIN A using LRD.          Activity Tolerance (Progressing)       Start:  08/26/24    Expected End:  09/09/24       Patient will complete perform therapeutic activity for >/= 15 minutes to improve overall strength/activity tolerance in preparation for ADLS.

## 2024-08-29 ENCOUNTER — NURSING HOME VISIT (OUTPATIENT)
Dept: POST ACUTE CARE | Facility: EXTERNAL LOCATION | Age: 89
End: 2024-08-29
Payer: MEDICARE

## 2024-08-29 VITALS
WEIGHT: 95 LBS | SYSTOLIC BLOOD PRESSURE: 119 MMHG | BODY MASS INDEX: 18.55 KG/M2 | DIASTOLIC BLOOD PRESSURE: 58 MMHG | TEMPERATURE: 98.3 F | OXYGEN SATURATION: 96 % | HEART RATE: 64 BPM | RESPIRATION RATE: 18 BRPM

## 2024-08-29 DIAGNOSIS — F03.90 DEMENTIA WITHOUT BEHAVIORAL DISTURBANCE, PSYCHOTIC DISTURBANCE, MOOD DISTURBANCE, OR ANXIETY, UNSPECIFIED DEMENTIA SEVERITY, UNSPECIFIED DEMENTIA TYPE (MULTI): ICD-10-CM

## 2024-08-29 DIAGNOSIS — I25.10 CORONARY ARTERY DISEASE INVOLVING NATIVE HEART, UNSPECIFIED VESSEL OR LESION TYPE, UNSPECIFIED WHETHER ANGINA PRESENT: ICD-10-CM

## 2024-08-29 DIAGNOSIS — E03.9 ACQUIRED HYPOTHYROIDISM: ICD-10-CM

## 2024-08-29 DIAGNOSIS — I10 HYPERTENSION, UNSPECIFIED TYPE: ICD-10-CM

## 2024-08-29 DIAGNOSIS — S73.004A HIP DISLOCATION, RIGHT, INITIAL ENCOUNTER (MULTI): Primary | ICD-10-CM

## 2024-08-29 DIAGNOSIS — F41.9 ANXIETY: ICD-10-CM

## 2024-08-29 PROCEDURE — 99309 SBSQ NF CARE MODERATE MDM 30: CPT | Performed by: PHYSICIAN ASSISTANT

## 2024-08-29 ASSESSMENT — ENCOUNTER SYMPTOMS
FREQUENCY: 0
HEMATURIA: 0
CHILLS: 0
VOMITING: 0
NERVOUS/ANXIOUS: 0
CONSTIPATION: 0
COUGH: 0
ABDOMINAL PAIN: 0
CONFUSION: 0
HEADACHES: 0
WHEEZING: 0
TREMORS: 0
DIARRHEA: 0
APPETITE CHANGE: 0
DYSURIA: 0
NAUSEA: 0
DIZZINESS: 0
WEAKNESS: 0
FEVER: 0
SHORTNESS OF BREATH: 0

## 2024-08-29 NOTE — LETTER
Patient: Mariaelena Tavarez  : 1935    Encounter Date: 2024      Subjective  [id number] : Mariaelena Tavarez is a 89 y.o. female admitted to [facility] for rehab. No chief complaint on file..  HPI  Pt with h/o CAD, HTN,  dementia and hypothyroidism admitted with fall and right hip dislocation s/p closed reduction with Dr Mccray on .  Pt seen while sitting up in wheelchair with immobilizer in place.  She reports no pain.  She has immobilizer in place to right lower extremity.  Nursing did report a fall on , when pt attempt to take herself to the bathroom.  No injury reported.   Pt had routine labs today which were reviewed and are stable.  She denies any shortness of breath or cough.  She has a good appetite.  She denies any abdominal pain.  NO n/v/d/c.   She has no lower leg edema noted.  Pt lives alone.    Review of Systems   Constitutional:  Negative for appetite change, chills and fever.   Respiratory:  Negative for cough, shortness of breath and wheezing.    Cardiovascular:  Negative for chest pain and leg swelling.   Gastrointestinal:  Negative for abdominal pain, constipation, diarrhea, nausea and vomiting.   Genitourinary:  Negative for dysuria, frequency and hematuria.   Neurological:  Negative for dizziness, tremors, weakness and headaches.   Psychiatric/Behavioral:  Negative for confusion. The patient is not nervous/anxious.    All other systems reviewed and are negative.      Objective  /58   Pulse 64   Temp 36.8 °C (98.3 °F)   Resp 18   Wt (!) 43.1 kg (95 lb)   SpO2 96%   BMI 18.55 kg/m²    Physical Exam  Constitutional:       General: She is not in acute distress.  Eyes:      Conjunctiva/sclera: Conjunctivae normal.      Pupils: Pupils are equal, round, and reactive to light.   Cardiovascular:      Rate and Rhythm: Normal rate and regular rhythm.      Heart sounds: No murmur heard.  Pulmonary:      Effort: Pulmonary effort is normal.      Breath sounds: No wheezing, rhonchi or  "rales.   Abdominal:      General: Abdomen is flat. Bowel sounds are normal. There is no distension.      Palpations: Abdomen is soft. There is no mass.      Tenderness: There is no abdominal tenderness.   Musculoskeletal:         General: No swelling. Normal range of motion.      Comments: Immobilizer in place to the right lower ext.    Skin:     General: Skin is warm and dry.      Findings: No rash.   Neurological:      General: No focal deficit present.      Mental Status: She is alert and oriented to person, place, and time. Mental status is at baseline.       Results for orders placed or performed in visit on 08/29/24 (from the past 24 hour(s))   Basic Metabolic Panel   Result Value Ref Range    Glucose 88 65 - 99 mg/dL    Sodium 139 133 - 145 mmol/L    Potassium 3.6 3.4 - 5.1 mmol/L    Chloride 98 97 - 107 mmol/L    Bicarbonate 32 (H) 24 - 31 mmol/L    Urea Nitrogen 32 (H) 8 - 25 mg/dL    Creatinine 0.90 0.40 - 1.60 mg/dL    eGFR 61 >60 mL/min/1.73m*2    Calcium 9.2 8.5 - 10.4 mg/dL    Anion Gap 9 <=19 mmol/L   CBC   Result Value Ref Range    WBC 7.0 4.4 - 11.3 x10*3/uL    nRBC 0.0 0.0 - 0.0 /100 WBCs    RBC 3.42 (L) 4.00 - 5.20 x10*6/uL    Hemoglobin 10.8 (L) 12.0 - 16.0 g/dL    Hematocrit 32.6 (L) 36.0 - 46.0 %    MCV 95 80 - 100 fL    MCH 31.6 26.0 - 34.0 pg    MCHC 33.1 32.0 - 36.0 g/dL    RDW 12.6 11.5 - 14.5 %    Platelets 213 150 - 450 x10*3/uL      No lab exists for component: \"CBC BMP\"  Assessment/Plan  Problem List Items Addressed This Visit             ICD-10-CM    Anxiety F41.9     Home med - lexapro         Coronary artery disease I25.10     Continue aspirin, atenolol, and crestor         Hypertension I10     Continue norvasc and atenolol.  Monitor blood pressures and adjust meds as needed.          Hypothyroid E03.9     synthroid         Hip dislocation, right, initial encounter (Multi) - Primary S73.004A     Continue PT/OT.  S/p total closed reduction on 8/24 with Dr Mccray.  Pain management with " tylenol and tramadol prn.  Follow up with ortho.         Dementia (Multi) F03.90     Continue donepezil            Time spent: 30 min in review of chart, labs and orders, consultation with pt and documentation.           Electronically Signed By: Carrie Ledezma PA-C   8/29/24  4:09 PM

## 2024-08-29 NOTE — ASSESSMENT & PLAN NOTE
Continue PT/OT.  S/p total closed reduction on 8/24 with Dr Mccray.  Pain management with tylenol and tramadol prn.  Follow up with ortho.

## 2024-08-30 ENCOUNTER — APPOINTMENT (OUTPATIENT)
Dept: RADIOLOGY | Facility: HOSPITAL | Age: 89
End: 2024-08-30
Payer: MEDICARE

## 2024-08-30 ENCOUNTER — HOSPITAL ENCOUNTER (EMERGENCY)
Facility: HOSPITAL | Age: 89
Discharge: HOME | End: 2024-08-30
Attending: EMERGENCY MEDICINE
Payer: MEDICARE

## 2024-08-30 VITALS
OXYGEN SATURATION: 96 % | DIASTOLIC BLOOD PRESSURE: 47 MMHG | HEART RATE: 70 BPM | RESPIRATION RATE: 15 BRPM | SYSTOLIC BLOOD PRESSURE: 104 MMHG | HEIGHT: 60 IN | BODY MASS INDEX: 18.85 KG/M2 | WEIGHT: 96 LBS | TEMPERATURE: 98 F
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
## 2024-08-30 DIAGNOSIS — S09.90XA HEAD INJURY, INITIAL ENCOUNTER: ICD-10-CM

## 2024-08-30 PROCEDURE — 99285 EMERGENCY DEPT VISIT HI MDM: CPT | Mod: 25 | Performed by: EMERGENCY MEDICINE

## 2024-08-30 PROCEDURE — 70450 CT HEAD/BRAIN W/O DYE: CPT | Performed by: RADIOLOGY

## 2024-08-30 PROCEDURE — 70450 CT HEAD/BRAIN W/O DYE: CPT

## 2024-08-30 PROCEDURE — 73552 X-RAY EXAM OF FEMUR 2/>: CPT | Mod: RT

## 2024-08-30 PROCEDURE — 73552 X-RAY EXAM OF FEMUR 2/>: CPT | Mod: RIGHT SIDE | Performed by: RADIOLOGY

## 2024-08-30 PROCEDURE — 72125 CT NECK SPINE W/O DYE: CPT

## 2024-08-30 PROCEDURE — 72125 CT NECK SPINE W/O DYE: CPT | Performed by: RADIOLOGY

## 2024-08-30 ASSESSMENT — PAIN SCALES - GENERAL
PAINLEVEL_OUTOF10: 0 - NO PAIN

## 2024-08-30 ASSESSMENT — PAIN - FUNCTIONAL ASSESSMENT
PAIN_FUNCTIONAL_ASSESSMENT: 0-10
PAIN_FUNCTIONAL_ASSESSMENT: 0-10

## 2024-08-30 ASSESSMENT — COLUMBIA-SUICIDE SEVERITY RATING SCALE - C-SSRS
2. HAVE YOU ACTUALLY HAD ANY THOUGHTS OF KILLING YOURSELF?: NO
1. IN THE PAST MONTH, HAVE YOU WISHED YOU WERE DEAD OR WISHED YOU COULD GO TO SLEEP AND NOT WAKE UP?: NO
6. HAVE YOU EVER DONE ANYTHING, STARTED TO DO ANYTHING, OR PREPARED TO DO ANYTHING TO END YOUR LIFE?: NO

## 2024-08-30 NOTE — ED PROVIDER NOTES
HPI   No chief complaint on file.      HPI        Patient History   Past Medical History:   Diagnosis Date    Age-related nuclear cataract of left eye 09/03/2023    Anxiety 09/03/2023    Benign paroxysmal positional vertigo 09/03/2023    Coronary artery disease 09/03/2023    Gastroesophageal reflux disease 09/03/2023    Hip dislocation, right, initial encounter (Multi) 08/23/2024    Hyperlipidemia 09/03/2023    Osteoarthritis 09/03/2023    Unspecified cataract     Cataract of right eye, unspecified cataract type    Urinary incontinence 09/03/2023    Venous thrombosis 09/03/2023    Vitamin D deficiency 09/03/2023     Past Surgical History:   Procedure Laterality Date    CATARACT EXTRACTION  09/04/2018    Cataract Surgery    CT GUIDED IMAGING FOR NEEDLE PLACEMENT  9/27/2016    CT GUIDED IMAGING FOR NEEDLE PLACEMENT [facility]     Family History   Problem Relation Name Age of Onset    Hypertension Mother      Stroke Mother      Arthritis Father      Parkinsonism Sister      Dementia Sister      Cancer Maternal Grandfather      Heart disease Other [facility]     Hypertension Other [facility]     Stroke Other [facility]     Hypertension Sibling      Stroke Sibling       Social History     Tobacco Use    Smoking status: Never    Smokeless tobacco: Never   Substance Use Topics    Alcohol use: Never    Drug use: Never       Physical Exam   ED Triage Vitals [08/30/24 0635]   Temperature Heart Rate Respirations BP   36.7 °C (98 °F) 89 18 136/65      Pulse Ox Temp Source Heart Rate Source Patient Position   100 % Oral Monitor Sitting      BP Location FiO2 (%)     Right arm --       Physical Exam      ED Course & MDM   Diagnoses as of 08/30/24 0824   Fall, initial encounter   Head injury, initial encounter                 No data recorded     Hamilton Coma Scale Score: 15 (08/30/24 0636 : Jagjit Ortega RN)                           Medical Decision Making    The patient is an 89-year-old female presenting to the emergency department by  EMS from the [facility] where she currently resides for rehabilitation after a fall just prior to arrival.  The patient reportedly does have a history of chronic balance issues and frequent falls.  She reportedly fell on 23 August 2024 and dislocated her hip.  It required surgery and she was hospitalized.  She was ultimately released to rehab (possibly on the 28th of August 2024).  She states her legs are little sore due to the physical therapy they are making her do but she does not have any other complaints at this time.  She did hit the back of her head when she fell but she did not lose consciousness.  She does not use any blood thinners.  She denies any headache or visual changes.  No neck or back pain.  No chest pain or shortness of breath.  No abdominal pain.  No nausea vomiting.  No diarrhea or constipation. no urinary complaints.  No fever or chills.  No cough or congestion.  All pertinent positives and negatives are recorded above.  All other systems reviewed and otherwise negative.  Vital signs within normal limits.  Physical exam with a well-nourished well-developed female in no acute distress.  HEENT exam with dry mucous membranes but otherwise unremarkable.  She does not have any evidence of airway compromise or respiratory distress.  Abdominal exam is benign.  She does not have any gross motor, neurologic or vascular deficits on exam.  NIH stroke scale score of 0.  She does have some scattered ecchymosis and abrasions on her extremities of various stages of healing.  She states that these are from previous falls.  She also has a skin tear to the left elbow that is covered with a bandage.  She states that that is from her fall several days ago.  She has no visible or palpable bony deformity.  No joint laxity.      The patient declined offer for pain medications at this time.  She states that she does not have any pain.      XR femur right 2+ views   Final Result   No acute pathologic  findings are identified. Right hip prosthesis.        MACRO:   none        Signed by: Josh Cowart 8/30/2024 7:46 AM   Dictation workstation:   [id number]      CT head wo IV contrast   Final Result   NO ACUTE INTRACRANIAL PROCESS. SKULL INTACT        NO ACUTE FRACTURE OR SUBLUXATION IN THE CERVICAL SPINE        THIS REPORT SERVES AS THE DIAGNOSTIC INTERPRETATION FOR TWO EXAMS   PERFORMED CONCURRENTLY: CT BRAIN WITHOUT IV CONTRAST AND CT CERVICAL   SPINE WITHOUT IV CONTRAST        MACRO:   None        Signed by: Wilbert Mcmillan 8/30/2024 7:17 AM   Dictation workstation:   [id number]      CT cervical spine wo IV contrast   Final Result   NO ACUTE INTRACRANIAL PROCESS. SKULL INTACT        NO ACUTE FRACTURE OR SUBLUXATION IN THE CERVICAL SPINE        THIS REPORT SERVES AS THE DIAGNOSTIC INTERPRETATION FOR TWO EXAMS   PERFORMED CONCURRENTLY: CT BRAIN WITHOUT IV CONTRAST AND CT CERVICAL   SPINE WITHOUT IV CONTRAST        MACRO:   None        Signed by: Wilbert Mcmillan 8/30/2024 7:17 AM   Dictation workstation:   [id number]           The patient does not have any gross motor, neurologic or vascular deficits on exam but she does not have any visible or palpable bony deformities on exam.  CT head without evidence of fracture, intracranial hemorrhage, mass effect.  CT C-spine without evidence of fracture or dislocation.  Right hip x-ray without evidence of dislocation or fracture.      The patient was released in good condition.  She was returned to the rehab facility where she resides.  She was instructed to follow-up with her primary care physician within 2 to 3 days for further management of her current symptoms.  She will return to the emergency department sooner with worsening of symptoms or onset of new symptoms.          Impression/diagnosis  Fall from standing height, initial encounter  Closed head injury  Scattered abrasions and contusions to her extremities      I reviewed the results of the diagnostic imaging.   Formal radiology reading was completed by the radiologist    Procedure  Procedures     Ruth Ann Son MD  08/30/24 0802       Ruth Ann Son MD  08/30/24 0824

## 2024-08-30 NOTE — DISCHARGE INSTRUCTIONS
Follow-up with your primary care physician within 1 to 2 days for further management of your current symptoms    Return to the emergency department sooner with worsening of symptoms or onset of new symptoms

## 2024-09-01 ENCOUNTER — APPOINTMENT (OUTPATIENT)
Dept: RADIOLOGY | Facility: HOSPITAL | Age: 89
End: 2024-09-01
Payer: MEDICARE

## 2024-09-01 ENCOUNTER — HOSPITAL ENCOUNTER (EMERGENCY)
Facility: HOSPITAL | Age: 89
Discharge: NURSING, REHAB OR LONG-TERM CARE | End: 2024-09-01
Payer: MEDICARE

## 2024-09-01 VITALS
RESPIRATION RATE: 16 BRPM | OXYGEN SATURATION: 95 % | DIASTOLIC BLOOD PRESSURE: 97 MMHG | TEMPERATURE: 98 F | HEART RATE: 62 BPM | SYSTOLIC BLOOD PRESSURE: 115 MMHG | WEIGHT: 92.81 LBS | HEIGHT: 62 IN | BODY MASS INDEX: 17.08 KG/M2

## 2024-09-01 DIAGNOSIS — S49.92XA INJURY OF LEFT SHOULDER, INITIAL ENCOUNTER: ICD-10-CM

## 2024-09-01 DIAGNOSIS — N39.0 ACUTE LOWER URINARY TRACT INFECTION: ICD-10-CM

## 2024-09-01 DIAGNOSIS — S09.90XA CLOSED HEAD INJURY, INITIAL ENCOUNTER: ICD-10-CM
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
## 2024-09-01 DIAGNOSIS — M25.562 ACUTE PAIN OF LEFT KNEE: ICD-10-CM

## 2024-09-01 PROCEDURE — 72125 CT NECK SPINE W/O DYE: CPT | Performed by: RADIOLOGY

## 2024-09-01 PROCEDURE — 70450 CT HEAD/BRAIN W/O DYE: CPT

## 2024-09-01 PROCEDURE — 72125 CT NECK SPINE W/O DYE: CPT

## 2024-09-01 PROCEDURE — 73564 X-RAY EXAM KNEE 4 OR MORE: CPT | Mod: LEFT SIDE | Performed by: RADIOLOGY

## 2024-09-01 PROCEDURE — 96365 THER/PROPH/DIAG IV INF INIT: CPT

## 2024-09-01 PROCEDURE — 70450 CT HEAD/BRAIN W/O DYE: CPT | Performed by: RADIOLOGY

## 2024-09-01 PROCEDURE — 73030 X-RAY EXAM OF SHOULDER: CPT | Mod: LT

## 2024-09-01 PROCEDURE — 73564 X-RAY EXAM KNEE 4 OR MORE: CPT | Mod: LT

## 2024-09-01 PROCEDURE — 99285 EMERGENCY DEPT VISIT HI MDM: CPT | Mod: 25

## 2024-09-01 PROCEDURE — 73030 X-RAY EXAM OF SHOULDER: CPT | Mod: LEFT SIDE | Performed by: RADIOLOGY

## 2024-09-01 PROCEDURE — 2500000004 HC RX 250 GENERAL PHARMACY W/ HCPCS (ALT 636 FOR OP/ED)

## 2024-09-01 RX ORDER — CEFTRIAXONE 1 G/50ML
1 INJECTION, SOLUTION INTRAVENOUS ONCE
Status: COMPLETED | OUTPATIENT
Start: 2024-09-01 | End: 2024-09-01

## 2024-09-01 ASSESSMENT — COLUMBIA-SUICIDE SEVERITY RATING SCALE - C-SSRS
6. HAVE YOU EVER DONE ANYTHING, STARTED TO DO ANYTHING, OR PREPARED TO DO ANYTHING TO END YOUR LIFE?: NO
2. HAVE YOU ACTUALLY HAD ANY THOUGHTS OF KILLING YOURSELF?: NO
1. IN THE PAST MONTH, HAVE YOU WISHED YOU WERE DEAD OR WISHED YOU COULD GO TO SLEEP AND NOT WAKE UP?: NO

## 2024-09-01 ASSESSMENT — PAIN DESCRIPTION - ORIENTATION: ORIENTATION: LEFT

## 2024-09-01 ASSESSMENT — PAIN DESCRIPTION - PAIN TYPE: TYPE: ACUTE PAIN

## 2024-09-01 ASSESSMENT — PAIN - FUNCTIONAL ASSESSMENT: PAIN_FUNCTIONAL_ASSESSMENT: 0-10

## 2024-09-01 ASSESSMENT — PAIN DESCRIPTION - PROGRESSION: CLINICAL_PROGRESSION: NOT CHANGED

## 2024-09-01 ASSESSMENT — PAIN DESCRIPTION - LOCATION: LOCATION: SHOULDER

## 2024-09-01 ASSESSMENT — PAIN SCALES - GENERAL: PAINLEVEL_OUTOF10: 6

## 2024-09-02 NOTE — ED PROVIDER NOTES
HPI   Chief Complaint   Patient presents with    Fall     Pt bib EMS from [facility] for fall, pt with c/o neck and left shoulder pain. C collar applied by EMS, vitals stable, 20G IV placed in LT AC.       Patient is an 89-year-old female presenting via EMS from [facility] after a fall.  Patient was complaining of mild neck and left shoulder pain.  Patient was placed in a c-collar upon arrival by EMS..  Patient does have a history of dementia as well as frequent falls.  She is currently being treated for urinary tract infection that was diagnosed yesterday.  Patient states that she is having mild neck pain, left shoulder pain and left knee pain.  She states that she was walking forward and when she went to turn she tripped and fell forward.  Patient is not on blood thinners.  Patient did not lose consciousness.  Patient does not believe that she hit her head.  Patient denies fevers, chills, cough, sore throat, runny nose, chest pain, shortness of breath, abdominal pain, nausea, vomiting, diarrhea or urinary complaints.              Patient History   Past Medical History:   Diagnosis Date    Age-related nuclear cataract of left eye 09/03/2023    Anxiety 09/03/2023    Benign paroxysmal positional vertigo 09/03/2023    Coronary artery disease 09/03/2023    Gastroesophageal reflux disease 09/03/2023    Hip dislocation, right, initial encounter (Multi) 08/23/2024    Hyperlipidemia 09/03/2023    Osteoarthritis 09/03/2023    Unspecified cataract     Cataract of right eye, unspecified cataract type    Urinary incontinence 09/03/2023    Venous thrombosis 09/03/2023    Vitamin D deficiency 09/03/2023     Past Surgical History:   Procedure Laterality Date    CATARACT EXTRACTION  09/04/2018    Cataract Surgery    CT GUIDED IMAGING FOR NEEDLE PLACEMENT  9/27/2016    CT GUIDED IMAGING FOR NEEDLE PLACEMENT [facility]     Family History   Problem Relation Name Age of Onset    Hypertension Mother       Stroke Mother      Arthritis Father      Parkinsonism Sister      Dementia Sister      Cancer Maternal Grandfather      Heart disease Other [facility]     Hypertension Other [facility]     Stroke Other [facility]     Hypertension Sibling      Stroke Sibling       Social History     Tobacco Use    Smoking status: Never    Smokeless tobacco: Never   Substance Use Topics    Alcohol use: Never    Drug use: Never       Physical Exam   ED Triage Vitals [09/01/24 1605]   Temperature Heart Rate Respirations BP   36.7 °C (98 °F) 62 16 (!) 115/97      Pulse Ox Temp Source Heart Rate Source Patient Position   95 % Tympanic -- --      BP Location FiO2 (%)     Right arm --       Physical Exam  Vitals and nursing note reviewed.   Constitutional:       Appearance: She is well-developed.      Comments: Awake, laying in examination bed   HENT:      Head: Normocephalic and atraumatic.      Nose: Nose normal.      Mouth/Throat:      Mouth: Mucous membranes are moist.      Pharynx: Oropharynx is clear.   Eyes:      Extraocular Movements: Extraocular movements intact.      Conjunctiva/sclera: Conjunctivae normal.      Pupils: Pupils are equal, round, and reactive to light.   Cardiovascular:      Rate and Rhythm: Normal rate and regular rhythm.      Pulses: Normal pulses.      Heart sounds: Normal heart sounds. No murmur heard.  Pulmonary:      Effort: Pulmonary effort is normal. No respiratory distress.      Breath sounds: Normal breath sounds.   Abdominal:      General: Abdomen is flat.      Palpations: Abdomen is soft.      Tenderness: There is no abdominal tenderness.   Musculoskeletal:         General: No swelling. Normal range of motion.      Cervical back: Normal range of motion and neck supple.      Comments: Normal range of motion to all extremities.  Slight tenderness to palpation over the anterior aspect of left shoulder.  Mild tenderness palpation of the medial aspect of left knee.   Skin:     General: Skin is warm and dry.      Capillary Refill:  Capillary refill takes less than 2 seconds.   Neurological:      General: No focal deficit present.      Mental Status: She is alert and oriented to person, place, and time.   Psychiatric:         Mood and Affect: Mood normal.         Behavior: Behavior normal.           ED Course & MDM   Diagnoses as of 09/02/24 0803   Fall, initial encounter   Closed head injury, initial encounter   Injury of left shoulder, initial encounter   Acute pain of left knee   Acute lower urinary tract infection                 No data recorded                                 Medical Decision Making  Patient is an 89-year-old female with past medical history of dementia presenting from [facility] via EMS after a fall.  Imaging ordered.  IV antibiotics ordered due to patient being recently diagnosed with UTI.  Conditions considered include but are not limited to: Mechanical fall, contusion, fracture, intracranial hemorrhage.    X-ray of left shoulder does show osteoarthritis without acute findings.  X-ray of left knee does show severe osteoarthritis without acute findings.  CT head without acute intracranial pathology.  Does show a moderate left occipital scalp contusion.  CT C-spine without acute findings.    I believe this patient is at low risk for complication, and a disposition of discharge is acceptable.  Return to the Emergency Department if new or worsening symptoms including headache, fever, chills, chest pain, shortness of breath, syncope, near syncope, abdominal pain, nausea, vomiting,  diarrhea, or worsening pain.  I discussed with the patient and family that the patient can be safely discharged back to her rehab facility.  I did discuss with them to make sure that she is taking her antibiotics for UTI regularly and as prescribed.  Discussed with this patient to utilize a cane/walker to try and reduce falls.  Patient is agreeable to disposition of discharge and to take antibiotics as prescribed until  completion.    Portions of this note made with Dragon software, please be mindful of potential grammatical errors.        Medications   cefTRIAXone (Rocephin) 1 g in dextrose (iso) IV 50 mL (0 g intravenous Stopped 9/1/24 1648)         CT head wo IV contrast   Final Result   CT HEAD:   1. No acute intracranial abnormality or calvarial fracture.   2. Moderate left occipital region scalp contusion, new since prior.        CT CERVICAL SPINE:   1. No acute fracture or traumatic malalignment of the cervical spine.   2. Advanced spondylosis at C5-6 and C6-7 with disc height loss and   osteophytes. Grade 1 C3-4 and C7-T1 anterolisthesis, likely   degenerative in etiology.        MACRO:   None.        Signed by: Alma Rosa Garcia 9/1/2024 4:58 PM   Dictation workstation:   [id number]      CT cervical spine wo IV contrast   Final Result   CT HEAD:   1. No acute intracranial abnormality or calvarial fracture.   2. Moderate left occipital region scalp contusion, new since prior.        CT CERVICAL SPINE:   1. No acute fracture or traumatic malalignment of the cervical spine.   2. Advanced spondylosis at C5-6 and C6-7 with disc height loss and   osteophytes. Grade 1 C3-4 and C7-T1 anterolisthesis, likely   degenerative in etiology.        MACRO:   None.        Signed by: Alma Rosa Garcia 9/1/2024 4:58 PM   Dictation workstation:   [id number]      XR knee left 4+ views   Final Result   1. Severe medial and mild lateral/patellofemoral compartment   osteoarthrosis.        MACRO:   None.        Signed by: Alma Rosa Garcia 9/1/2024 4:49 PM   Dictation workstation:   [id number]      XR shoulder left 2+ views   Final Result   1. Mild acromioclavicular joint osteoarthrosis. Otherwise,   unremarkable left shoulder radiographs.        MACRO:        None.        Signed by: Alma Rosa Garcia 9/1/2024 4:32 PM   Dictation workstation:   [id number]            Procedure  Procedures     Deacon Romano PA-C  09/02/24 0803

## 2024-09-03 ENCOUNTER — HOSPITAL ENCOUNTER (EMERGENCY)
Facility: HOSPITAL | Age: 89
Discharge: HOME | End: 2024-09-03
Attending: EMERGENCY MEDICINE
Payer: MEDICARE

## 2024-09-03 ENCOUNTER — APPOINTMENT (OUTPATIENT)
Dept: RADIOLOGY | Facility: HOSPITAL | Age: 89
End: 2024-09-03
Payer: MEDICARE

## 2024-09-03 VITALS
BODY MASS INDEX: 16.8 KG/M2 | OXYGEN SATURATION: 94 % | DIASTOLIC BLOOD PRESSURE: 72 MMHG | WEIGHT: 89 LBS | RESPIRATION RATE: 16 BRPM | HEIGHT: 61 IN | HEART RATE: 89 BPM | SYSTOLIC BLOOD PRESSURE: 110 MMHG | TEMPERATURE: 97.9 F
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
## 2024-09-03 PROCEDURE — 73110 X-RAY EXAM OF WRIST: CPT | Mod: RT

## 2024-09-03 PROCEDURE — 73110 X-RAY EXAM OF WRIST: CPT | Mod: RIGHT SIDE | Performed by: RADIOLOGY

## 2024-09-03 PROCEDURE — 99283 EMERGENCY DEPT VISIT LOW MDM: CPT

## 2024-09-03 ASSESSMENT — PAIN DESCRIPTION - LOCATION: LOCATION: WRIST

## 2024-09-03 ASSESSMENT — PAIN SCALES - GENERAL
PAINLEVEL_OUTOF10: 0 - NO PAIN
PAINLEVEL_OUTOF10: 9

## 2024-09-03 ASSESSMENT — PAIN - FUNCTIONAL ASSESSMENT
PAIN_FUNCTIONAL_ASSESSMENT: 0-10
PAIN_FUNCTIONAL_ASSESSMENT: 0-10

## 2024-09-03 ASSESSMENT — PAIN DESCRIPTION - ORIENTATION: ORIENTATION: RIGHT

## 2024-09-03 ASSESSMENT — PAIN DESCRIPTION - PAIN TYPE: TYPE: ACUTE PAIN

## 2024-09-04 NOTE — DISCHARGE INSTRUCTIONS
Thank you for choosing [facility] Emergency Department. It was my pleasure to be involved in your care today.         As of today's visit, based on reasonable likelihood, that it is safe for you to be discharged back to your residence to follow-up as an outpatient for ongoing management of your medical problem. You should follow-up with any referrals / primary provider as soon as possible. The contacts (number, addresses) are listed below.         Important:  Even though we think it is safe for you to go home, there is always a small chance that we are missing something that could require hospitalization.  Therefore it is very important that if you get worse or develops any new symptoms that you return here as soon as possible to be re-evaluated.  This includes return of symptoms that have resolved such as fainting, chest pain, or symptoms that could be warning signs for stroke important:  Even though we think it is safe for you to go home, there is always a small chance that we are missing something that could require hospitalization.  Therefore it is very important that if you get worse or develops any new symptoms that you return here as soon as possible to be re-evaluated.  This includes return of symptoms that have resolved such as fainting, chest pain, or symptoms that could be warning signs for stroke         Make sure your pharmacy and primary doctor is aware of any new medications prescribed today.          It is your responsibility to contact as soon as possible, and follow through with, any referrals you were given today. We do recommend you inform them you are a [facility] follow-up patient, as often they can better accommodate your need to be seen, provided their schedules allow. We will, and have, made every effort to ensure you have access to adequate follow-up specialists available.          All problems may not be able to be fixed in one ER visit. This is why timely ongoing care is important, and this  is a responsibility you share in. Further, you are free to follow up with any provider you choose, and this is not limited to our suggestion.          If cultures were obtained today, you will be contacted should anything result that would require further treatment. Please contact the ED at the number provided with questions.          Having trouble affording medications? Try [email or web address]! (This is not a hospital endorsed website, merely a recommendation based on my own personal experiences with [employer])

## 2024-09-04 NOTE — ED PROVIDER NOTES
HPI   Chief Complaint   Patient presents with    Fall     Pt presents to the ED via ems for c/o fall.  Pt was walking and lost her balance causing her to fall into a door.  Pt denies loc, or hitting her head, has right wrist pain.        Patient is an 89-year-old female presenting via EMS from a nursing facility after a witnessed fall.  Patient states that she tripped and stumbled into a door.  States that she did not hit her head.  She states that she is not on blood thinners.  States she has mild wrist discomfort but is able to flex and extend it without difficulty.  States that she has had multiple falls because she has been neglecting to use her walker.  Denies numbness or tingling in her fingers.  Patient denies fevers, chills, cough, sore throat, runny nose, chest pain, shortness of breath, abdominal pain, nausea, vomiting, diarrhea or urinary complaints.              Patient History   Past Medical History:   Diagnosis Date    Age-related nuclear cataract of left eye 09/03/2023    Anxiety 09/03/2023    Benign paroxysmal positional vertigo 09/03/2023    Coronary artery disease 09/03/2023    Gastroesophageal reflux disease 09/03/2023    Hip dislocation, right, initial encounter (Multi) 08/23/2024    Hyperlipidemia 09/03/2023    Osteoarthritis 09/03/2023    Unspecified cataract     Cataract of right eye, unspecified cataract type    Urinary incontinence 09/03/2023    Venous thrombosis 09/03/2023    Vitamin D deficiency 09/03/2023     Past Surgical History:   Procedure Laterality Date    CATARACT EXTRACTION  09/04/2018    Cataract Surgery    CT GUIDED IMAGING FOR NEEDLE PLACEMENT  9/27/2016    CT GUIDED IMAGING FOR NEEDLE PLACEMENT [facility]     Family History   Problem Relation Name Age of Onset    Hypertension Mother      Stroke Mother      Arthritis Father      Parkinsonism Sister      Dementia Sister      Cancer Maternal Grandfather      Heart disease Other [facility]     Hypertension Other [facility]     Stroke  Other [facility]     Hypertension Sibling      Stroke Sibling       Social History     Tobacco Use    Smoking status: Never    Smokeless tobacco: Never   Substance Use Topics    Alcohol use: Never    Drug use: Never       Physical Exam   ED Triage Vitals [09/03/24 1937]   Temperature Heart Rate Respirations BP   36.6 °C (97.9 °F) 89 16 110/72      Pulse Ox Temp src Heart Rate Source Patient Position   94 % -- -- --      BP Location FiO2 (%)     -- --       Physical Exam  Vitals and nursing note reviewed.   Constitutional:       Appearance: She is well-developed.      Comments: Awake, sitting in examination chair   HENT:      Head: Normocephalic and atraumatic.      Nose: Nose normal.      Mouth/Throat:      Mouth: Mucous membranes are moist.      Pharynx: Oropharynx is clear.   Eyes:      Extraocular Movements: Extraocular movements intact.      Conjunctiva/sclera: Conjunctivae normal.      Pupils: Pupils are equal, round, and reactive to light.   Cardiovascular:      Rate and Rhythm: Normal rate and regular rhythm.      Pulses: Normal pulses.      Heart sounds: Normal heart sounds. No murmur heard.  Pulmonary:      Effort: Pulmonary effort is normal. No respiratory distress.      Breath sounds: Normal breath sounds.   Abdominal:      General: Abdomen is flat.      Palpations: Abdomen is soft.      Tenderness: There is no abdominal tenderness.   Musculoskeletal:         General: No swelling. Normal range of motion.      Cervical back: Normal range of motion and neck supple.      Comments: Mild tenderness palpation of the dorsal aspect of right wrist   Skin:     General: Skin is warm and dry.      Capillary Refill: Capillary refill takes less than 2 seconds.      Comments: Soft compartments, normal distal pulses   Neurological:      General: No focal deficit present.      Mental Status: She is alert and oriented to person, place, and time.   Psychiatric:         Mood and Affect: Mood normal.         Behavior: Behavior  normal.           ED Course & MDM   Diagnoses as of 09/04/24 0908   Fall, initial encounter                 No data recorded     Hamilton Coma Scale Score: 15 (09/03/24 1941 : Anatoliy Nelson, CHADD)                           Medical Decision Making  Patient is an 89-year-old female presenting via EMS from a nursing facility after a fall.  X-ray of wrist ordered.  Conditions considered include but are not limited to: Contusion, fracture, ligamentous injury.  Patient endorses she does not need anything for pain at this time.    I saw this patient in conjunction with Dr. Garcia.  X-ray without acute findings.  I believe this patient is at low risk for complication, and a disposition of discharge is acceptable.  Return to the Emergency Department if new or worsening symptoms including headache, fever, chills, chest pain, shortness of breath, syncope, near syncope, abdominal pain, nausea, vomiting,  diarrhea, or worsening pain.  Patient is agreeable to a disposition of discharge back to her nursing facility to follow with primary care in the next several days.    Portions of this note made with Dragon software, please be mindful of potential grammatical errors.        XR wrist right 3+ views   Final Result   1. No radiographically evident fracture or dislocation.   2. Severe degenerative changes of the triscaphe thin 1st   carpometacarpal joints which could obscure traumatic injury.             If there is clinical concern for radiographically occult fracture,   consider immobilization and short interval follow-up radiographs or   CT/MRI.        Signed by: Joe Langston 9/3/2024 8:46 PM   Dictation workstation:   [id number]            Procedure  Procedures     Deacon Romano PA-C  09/04/24 0911

## 2024-09-04 NOTE — ED PROVIDER NOTES
This patient was seen by the advanced practice provider.  I have personally performed a substantive portion of the encounter.      I have seen and examined the patient; agree with the workup, evaluation, MDM, management and diagnosis.  The care plan has been discussed.       I personally saw the patient and made/approved the management plan and take responsibility for the patient management.       HPI:  89-year-old female who is a nursing home frequent falls was walking lost her balance and fell into a door denies loss conscious or hitting her head.  Complains of right wrist pain.    Physical exam:  General:  Awake, alert, no acute distress.  Head: Normocephalic, Atraumatic  Neck: Supple, trachea midline, no stridor, no bony tenderness  Skin: Warm and dry, no rashes   Lungs:  No acute respiratory distress, speaking in full sentences without difficulty  Neuro:  No gross focal neurologic deficits, NIH is 0  Musculoskeletal:  Full range of motion in all 4 extremities.  Right upper extremity: No pain with palpation or range of motion of right elbow, no signs of trauma to right wrist.  Neurovascular intact distally  Psychiatric:  Alert oriented x 3, Good insight into condition.    MDM:  Patient right wrist x-ray is negative.  Patient not appear to have any other injuries secondary to this fall.  Discussed the finding with the patient and family at bedside.  She is stable for discharge back to nursing facility.     Pepito Garcia DO  09/04/24 0045

## 2024-09-05 ENCOUNTER — NURSING HOME VISIT (OUTPATIENT)
Dept: POST ACUTE CARE | Facility: EXTERNAL LOCATION | Age: 89
End: 2024-09-05
Payer: MEDICARE

## 2024-09-05 VITALS
HEART RATE: 62 BPM | SYSTOLIC BLOOD PRESSURE: 130 MMHG | TEMPERATURE: 98 F | BODY MASS INDEX: 16.3 KG/M2 | DIASTOLIC BLOOD PRESSURE: 65 MMHG | RESPIRATION RATE: 18 BRPM | WEIGHT: 85.2 LBS | OXYGEN SATURATION: 96 %

## 2024-09-05 DIAGNOSIS — F03.90 DEMENTIA WITHOUT BEHAVIORAL DISTURBANCE, PSYCHOTIC DISTURBANCE, MOOD DISTURBANCE, OR ANXIETY, UNSPECIFIED DEMENTIA SEVERITY, UNSPECIFIED DEMENTIA TYPE (MULTI): ICD-10-CM

## 2024-09-05 DIAGNOSIS — F41.9 ANXIETY: ICD-10-CM

## 2024-09-05 DIAGNOSIS — I25.10 CORONARY ARTERY DISEASE INVOLVING NATIVE HEART, UNSPECIFIED VESSEL OR LESION TYPE, UNSPECIFIED WHETHER ANGINA PRESENT: ICD-10-CM

## 2024-09-05 DIAGNOSIS — S73.004A HIP DISLOCATION, RIGHT, INITIAL ENCOUNTER (MULTI): Primary | ICD-10-CM

## 2024-09-05 DIAGNOSIS — I10 HYPERTENSION, UNSPECIFIED TYPE: ICD-10-CM

## 2024-09-05 DIAGNOSIS — E03.9 ACQUIRED HYPOTHYROIDISM: ICD-10-CM

## 2024-09-05 PROCEDURE — 99309 SBSQ NF CARE MODERATE MDM 30: CPT | Performed by: PHYSICIAN ASSISTANT

## 2024-09-05 ASSESSMENT — ENCOUNTER SYMPTOMS
CONSTIPATION: 0
TREMORS: 0
VOMITING: 0
HEADACHES: 0
NERVOUS/ANXIOUS: 0
NAUSEA: 0
WEAKNESS: 0
FEVER: 0
APPETITE CHANGE: 0
ABDOMINAL PAIN: 0
COUGH: 0
SHORTNESS OF BREATH: 0
WHEEZING: 0
CHILLS: 0
HEMATURIA: 0
DYSURIA: 0
CONFUSION: 0
FREQUENCY: 0
DIZZINESS: 0
DIARRHEA: 0

## 2024-09-05 NOTE — LETTER
Patient: Mariaelena Tavarez  : 1935    Encounter Date: 2024      Subjective  [id number] : Mariaelena Tavarez is a 89 y.o. female admitted to [facility] for rehab. No chief complaint on file..  HPI  Pt with h/o CAD, HTN,  dementia and hypothyroidism admitted with fall and right hip dislocation s/p closed reduction with Dr Mccray on .  Pt seen while sitting up in wheelchair with immobilizer in place.  Pt has had multiple falls.  She has been seen in the ER 3 times since admission for falls.  No further injuries have been sustained.  She denies any pain.   She has immobilizer in place to right lower extremity.  She is planning for discharge to [facility] tomorrow.     She denies any shortness of breath or cough.  She has poor po intake and has had a weight loss since admission. She denies any abdominal pain.  NO n/v/d/c.   She has no lower leg edema noted.  Pt lives alone.    Review of Systems   Constitutional:  Negative for appetite change, chills and fever.   Respiratory:  Negative for cough, shortness of breath and wheezing.    Cardiovascular:  Negative for chest pain and leg swelling.   Gastrointestinal:  Negative for abdominal pain, constipation, diarrhea, nausea and vomiting.   Genitourinary:  Negative for dysuria, frequency and hematuria.   Neurological:  Negative for dizziness, tremors, weakness and headaches.   Psychiatric/Behavioral:  Negative for confusion. The patient is not nervous/anxious.    All other systems reviewed and are negative.      Objective  /65   Pulse 62   Temp 36.7 °C (98 °F)   Resp 18   Wt (!) 38.6 kg (85 lb 3.2 oz)   SpO2 96%   BMI 16.30 kg/m²    Physical Exam  Constitutional:       General: She is not in acute distress.  Eyes:      Conjunctiva/sclera: Conjunctivae normal.      Pupils: Pupils are equal, round, and reactive to light.   Cardiovascular:      Rate and Rhythm: Normal rate and regular rhythm.      Heart sounds: No murmur heard.  Pulmonary:      Effort:  "Pulmonary effort is normal.      Breath sounds: No wheezing, rhonchi or rales.   Abdominal:      General: Abdomen is flat. Bowel sounds are normal. There is no distension.      Palpations: Abdomen is soft. There is no mass.      Tenderness: There is no abdominal tenderness.   Musculoskeletal:         General: No swelling. Normal range of motion.      Comments: Immobilizer in place to the right lower ext.    Skin:     General: Skin is warm and dry.      Findings: No rash.   Neurological:      General: No focal deficit present.      Mental Status: She is alert and oriented to person, place, and time. Mental status is at baseline.       No results found for this or any previous visit (from the past 24 hour(s)).     No lab exists for component: \"CBC BMP\"  Assessment/Plan  Anxiety F41.9        Home med - lexapro           Coronary artery disease I25.10       Continue aspirin, atenolol, and crestor           Hypertension I10       Continue norvasc and atenolol.  Monitor blood pressures and adjust meds as needed.            Hypothyroid E03.9       synthroid           Hip dislocation, right, initial encounter (Multi) - Primary S73.004A       Continue PT/OT.  S/p total closed reduction on 8/24 with Dr Mccray.  Pain management with tylenol and tramadol prn.  Follow up with ortho Dr Mccray on 9/25.  Discharge to [facility] tomorrow.  Paperwork for AL completed.             Dementia (Multi) F03.90       Continue donepezil         UTI:  treated with course of keflex.      Time spent: 30 min in review of chart, labs and orders, consultation with pt and documentation.           Electronically Signed By: Carrie Ledezma PA-C   9/5/24  8:05 PM  "

## 2024-09-05 NOTE — PROGRESS NOTES
Subjective   [id number] : Mariaelena Tavarez is a 89 y.o. female admitted to [facility] for rehab. No chief complaint on file..  HPI  Pt with h/o CAD, HTN,  dementia and hypothyroidism admitted with fall and right hip dislocation s/p closed reduction with Dr Mccray on 8/24.  Pt seen while sitting up in wheelchair with immobilizer in place.  Pt has had multiple falls.  She has been seen in the ER 3 times since admission for falls.  No further injuries have been sustained.  She denies any pain.   She has immobilizer in place to right lower extremity.  She is planning for discharge to [facility] tomorrow.     She denies any shortness of breath or cough.  She has poor po intake and has had a weight loss since admission. She denies any abdominal pain.  NO n/v/d/c.   She has no lower leg edema noted.  Pt lives alone.    Review of Systems   Constitutional:  Negative for appetite change, chills and fever.   Respiratory:  Negative for cough, shortness of breath and wheezing.    Cardiovascular:  Negative for chest pain and leg swelling.   Gastrointestinal:  Negative for abdominal pain, constipation, diarrhea, nausea and vomiting.   Genitourinary:  Negative for dysuria, frequency and hematuria.   Neurological:  Negative for dizziness, tremors, weakness and headaches.   Psychiatric/Behavioral:  Negative for confusion. The patient is not nervous/anxious.    All other systems reviewed and are negative.      Objective   /65   Pulse 62   Temp 36.7 °C (98 °F)   Resp 18   Wt (!) 38.6 kg (85 lb 3.2 oz)   SpO2 96%   BMI 16.30 kg/m²    Physical Exam  Constitutional:       General: She is not in acute distress.  Eyes:      Conjunctiva/sclera: Conjunctivae normal.      Pupils: Pupils are equal, round, and reactive to light.   Cardiovascular:      Rate and Rhythm: Normal rate and regular rhythm.      Heart sounds: No murmur heard.  Pulmonary:      Effort: Pulmonary effort is normal.      Breath sounds: No wheezing, rhonchi or  "rales.   Abdominal:      General: Abdomen is flat. Bowel sounds are normal. There is no distension.      Palpations: Abdomen is soft. There is no mass.      Tenderness: There is no abdominal tenderness.   Musculoskeletal:         General: No swelling. Normal range of motion.      Comments: Immobilizer in place to the right lower ext.    Skin:     General: Skin is warm and dry.      Findings: No rash.   Neurological:      General: No focal deficit present.      Mental Status: She is alert and oriented to person, place, and time. Mental status is at baseline.       No results found for this or any previous visit (from the past 24 hour(s)).     No lab exists for component: \"CBC BMP\"  Assessment/Plan   Anxiety F41.9        Home med - lexapro           Coronary artery disease I25.10       Continue aspirin, atenolol, and crestor           Hypertension I10       Continue norvasc and atenolol.  Monitor blood pressures and adjust meds as needed.            Hypothyroid E03.9       synthroid           Hip dislocation, right, initial encounter (Multi) - Primary S73.004A       Continue PT/OT.  S/p total closed reduction on 8/24 with Dr Mccray.  Pain management with tylenol and tramadol prn.  Follow up with ortho Dr Mccray on 9/25.  Discharge to [facility] tomorrow.  Paperwork for AL completed.             Dementia (Multi) F03.90       Continue donepezil         UTI:  treated with course of keflex.      Time spent: 30 min in review of chart, labs and orders, consultation with pt and documentation.       "

## 2024-09-06 ENCOUNTER — NURSING HOME VISIT (OUTPATIENT)
Dept: POST ACUTE CARE | Facility: EXTERNAL LOCATION | Age: 89
End: 2024-09-06
Payer: MEDICARE

## 2024-09-06 DIAGNOSIS — E53.8 B12 DEFICIENCY: ICD-10-CM

## 2024-09-06 DIAGNOSIS — E78.2 MIXED HYPERLIPIDEMIA: ICD-10-CM

## 2024-09-06 DIAGNOSIS — F01.A0 MILD VASCULAR DEMENTIA WITHOUT BEHAVIORAL DISTURBANCE, PSYCHOTIC DISTURBANCE, MOOD DISTURBANCE, OR ANXIETY (MULTI): ICD-10-CM

## 2024-09-06 DIAGNOSIS — E03.8 OTHER SPECIFIED HYPOTHYROIDISM: ICD-10-CM

## 2024-09-06 DIAGNOSIS — E55.9 VITAMIN D DEFICIENCY: ICD-10-CM

## 2024-09-06 DIAGNOSIS — S73.004A HIP DISLOCATION, RIGHT, INITIAL ENCOUNTER (MULTI): ICD-10-CM

## 2024-09-06 DIAGNOSIS — M62.82 NON-TRAUMATIC RHABDOMYOLYSIS: ICD-10-CM

## 2024-09-06 DIAGNOSIS — M15.9 PRIMARY OSTEOARTHRITIS INVOLVING MULTIPLE JOINTS: ICD-10-CM

## 2024-09-06 DIAGNOSIS — I10 PRIMARY HYPERTENSION: Primary | ICD-10-CM

## 2024-09-06 DIAGNOSIS — Z71.89 ADVANCED DIRECTIVES, COUNSELING/DISCUSSION: ICD-10-CM

## 2024-09-06 ASSESSMENT — PAIN SCALES - GENERAL: PAINLEVEL: 0-NO PAIN

## 2024-09-06 NOTE — LETTER
"Patient: Mariaelena Tavarez  : 1935    Encounter Date: 2024    Subjective  Patient ID: Mariaelena Tavarez is a 89 y.o. female who is assisted living/ home patient being seen at [facility], and evaluated to Establish Care.     HPI   Pt visited in apartment of assisted living. Pt up in wheelchair, oriented x3, comfortable and denies pain. Daughter Monica present for visit along with son in law. Pt recently living at home independently until she had a fall and dislocated right hip. Pt had a total closed reduction and went to rehab. Pt had approximately three falls while at rehab and had been sent to ER for evaluation. Pt now residing at AL for additional assistance, safety, and therapy.     Pt wears glasses, and states vision is poor. Pt states she is due for an eye exam this coming December. Pt states hearing is good, and appears to hear normal tone of voice. Pt with own dentition, denies difficulty with chewing and swallowing. Pt states appetite is good, eats approximately 100% of meals. Pt denies headache, dizziness, congestion, runny nose and sore throat.     Pt denies chest pain and sob at rest and exertion. Pt denies voiding symptoms and constipation. Pt currently has one day remaining on Keflex for UTI. Pt states she is incontinent at night, and wears a pad. Pt denies sleep disturbances. Pt utilizing wheelchair for mobility. Pt was ambulating independently previous to right hip dislocation. Pt with a walker and rollator in room, looking forward to working with PT to increase strength, balance, endurance, and decrease risk for falls . Pt admits to sadness due to being lonely when living at home alone. Pt states \"I'm looking forward to socializing and making friends\". All questions answered, family/pt with no other concerns at this time.     Current Outpatient Medications on File Prior to Visit   Medication Sig Dispense Refill   • acetaminophen (Tylenol) 325 mg tablet Take 2 tablets (650 mg) by mouth " every 4 hours if needed for mild pain (1 - 3), headaches or fever (temp greater than 38.0 C).     • alum-mag hydroxide-simeth (Mylanta) 200-200-20 mg/5 mL oral suspension Take 30 mL by mouth every 4 hours if needed for indigestion or heartburn.     • amLODIPine (Norvasc) 10 mg tablet Take 1 tablet (10 mg) by mouth once daily. (Patient taking differently: Take 1 tablet (10 mg) by mouth once daily. Hold for SBP less than 120, and HR less than 60.) 90 tablet 3   • aspirin 81 mg chewable tablet Chew 1 tablet (81 mg) once daily.     • atenolol (Tenormin) 25 mg tablet Take 1 tablet (25 mg) by mouth 2 times a day. 25 mg 2x a day (Patient taking differently: Take 1 tablet (25 mg) by mouth 2 times a day. Hold for SBP less than 120, and HR less than 60.) 180 tablet 3   • cholecalciferol (Vitamin D-3) 50 MCG (2000 UT) tablet Take 1 tablet (2,000 Units) by mouth once daily.     • cyanocobalamin (Vitamin B-12) 1,000 mcg tablet Take 1 tablet (1,000 mcg) by mouth once daily.     • donepezil (Aricept) 10 mg tablet TAKE ONE TABLET BY MOUTH DAILY AT BEDTIME AS DIRECTED 90 tablet 1   • escitalopram (Lexapro) 5 mg tablet 1 tablet a day 90 tablet 3   • gabapentin (Neurontin) 100 mg capsule TAKE 1 CAPSULE BY MOUTH IN THE MORNING AND THEN TAKE 2 CAPSULES AT NIGHT AS DIRECTED for 90 270 capsule 3   • levothyroxine (Synthroid, Levoxyl) 25 mcg tablet Take 1 tablet (25 mcg) by mouth once daily in the morning. Take before meals. . for 90 90 tablet 3   • magnesium hydroxide (Milk of Magnesia) 400 mg/5 mL suspension Take 30 mL by mouth once daily as needed for constipation (If no BM in 3 days.).     • pantoprazole (ProtoNix) 40 mg EC tablet Take 1 tablet (40 mg) by mouth once daily. 90 tablet 3   • rosuvastatin (Crestor) 40 mg tablet Take 1 tablet (40 mg) by mouth once daily. 90 tablet 3   • [DISCONTINUED] traMADol (Ultram) 50 mg tablet Take 1 tablet (50 mg) by mouth every 6 hours if needed for moderate pain (4 - 6). (Patient not taking:  Reported on 9/10/2024) 12 tablet 0     No current facility-administered medications on file prior to visit.        Review of Systems   Constitutional:  Positive for activity change. Negative for appetite change, chills, fatigue and fever.   HENT:  Negative for congestion, hearing loss and rhinorrhea.    Eyes:  Positive for visual disturbance.        Glasses   Respiratory:  Negative for cough and shortness of breath.    Cardiovascular:  Negative for chest pain, palpitations and leg swelling.   Gastrointestinal:  Negative for constipation, diarrhea, nausea and vomiting.   Genitourinary:  Negative for dysuria and frequency.   Musculoskeletal:  Positive for arthralgias and gait problem.        Utilizing wheelchair for mobility at this time.    Neurological:  Positive for weakness. Negative for dizziness and headaches.   Psychiatric/Behavioral:  Negative for sleep disturbance. The patient is not nervous/anxious.        Objective  BP 98/50 (BP Location: Left arm, Patient Position: Sitting, BP Cuff Size: Adult)   Pulse 62   Resp 16   SpO2 99%     Physical Exam  Constitutional:       General: She is awake. She is not in acute distress.     Appearance: Normal appearance. She is not ill-appearing.   HENT:      Head: Normocephalic.      Nose: No congestion or rhinorrhea.      Mouth/Throat:      Mouth: Mucous membranes are moist.   Eyes:      Conjunctiva/sclera: Conjunctivae normal.      Pupils: Pupils are equal, round, and reactive to light.   Cardiovascular:      Rate and Rhythm: Normal rate and regular rhythm.      Pulses: Normal pulses.      Heart sounds: Normal heart sounds.   Pulmonary:      Effort: Pulmonary effort is normal. No respiratory distress.      Breath sounds: Normal breath sounds. No wheezing or rhonchi.   Abdominal:      General: Bowel sounds are normal.      Palpations: Abdomen is soft.   Musculoskeletal:         General: Normal range of motion.      Cervical back: Normal range of motion.      Right lower  leg: No edema.      Left lower leg: No edema.   Skin:     General: Skin is warm.      Capillary Refill: Capillary refill takes less than 2 seconds.             Comments: Skin tear to right elbow approximately 0.5 in length.    Neurological:      General: No focal deficit present.      Mental Status: She is alert and oriented to person, place, and time. Mental status is at baseline.   Psychiatric:         Attention and Perception: Attention normal.         Mood and Affect: Mood normal.         Speech: Speech normal.         Behavior: Behavior normal. Behavior is cooperative.         Thought Content: Thought content normal.         Cognition and Memory: Cognition and memory normal. Cognition is not impaired. Memory is not impaired.         Judgment: Judgment normal.         Assessment/Plan  Diagnoses and all orders for this visit:  Primary hypertension  Comments:  Hypotensive today, continue to monitor BP, parameters added.  Mixed hyperlipidemia  Comments:  Obtain Lipid Panel.  Other specified hypothyroidism  Comments:  Obtain TSH.  Vitamin D deficiency  Comments:  Obtain Vitamin D level.  B12 deficiency  Comments:  Obtain B12 level.  Mild vascular dementia without behavioral disturbance, psychotic disturbance, mood disturbance, or anxiety (Multi)  Comments:  Pt appropriate for assisted living setting without secured memory care.  Hip dislocation, right, initial encounter (Multi)  Comments:  Refer to PT/OT to eval and treat.  Primary osteoarthritis involving multiple joints  Non-traumatic rhabdomyolysis  Living in assisted living  Comments:  Facility chart, and medical records reviewed, physician orders reviewed & signed, BMP, CBC, TSH, Lipid, Vit B & D ordered. Collaboration with nursing.  Advanced directives, counseling/discussion  Comments:  Code statuses reviewed in detail, DNR-CCA signed and placed in facility chart.          Electronically Signed By: SHANIKA Bhatia-CNP   9/10/24 12:39 PM

## 2024-09-09 ENCOUNTER — DOCUMENTATION (OUTPATIENT)
Dept: PRIMARY CARE | Facility: CLINIC | Age: 89
End: 2024-09-09
Payer: MEDICARE

## 2024-09-09 ENCOUNTER — PATIENT OUTREACH (OUTPATIENT)
Dept: PRIMARY CARE | Facility: CLINIC | Age: 89
End: 2024-09-09
Payer: MEDICARE

## 2024-09-10 VITALS
DIASTOLIC BLOOD PRESSURE: 50 MMHG | OXYGEN SATURATION: 99 % | HEART RATE: 62 BPM | RESPIRATION RATE: 16 BRPM | SYSTOLIC BLOOD PRESSURE: 98 MMHG

## 2024-09-10 RX ORDER — ALUMINUM HYDROXIDE, MAGNESIUM HYDROXIDE, AND SIMETHICONE 1200; 120; 1200 MG/30ML; MG/30ML; MG/30ML
30 SUSPENSION ORAL EVERY 4 HOURS PRN
COMMUNITY

## 2024-09-10 RX ORDER — ADHESIVE BANDAGE
30 BANDAGE TOPICAL DAILY PRN
COMMUNITY

## 2024-09-10 ASSESSMENT — ENCOUNTER SYMPTOMS
SLEEP DISTURBANCE: 0
FREQUENCY: 0
VOMITING: 0
NAUSEA: 0
CONSTIPATION: 0
PALPITATIONS: 0
APPETITE CHANGE: 0
ACTIVITY CHANGE: 1
WEAKNESS: 1
FEVER: 0
NERVOUS/ANXIOUS: 0
CHILLS: 0
FATIGUE: 0
DIZZINESS: 0
ARTHRALGIAS: 1
RHINORRHEA: 0
DYSURIA: 0
HEADACHES: 0
COUGH: 0
DIARRHEA: 0
SHORTNESS OF BREATH: 0

## 2024-09-10 NOTE — PROGRESS NOTES
"Subjective   Patient ID: Mariaelena Tavarez is a 89 y.o. female who is assisted living/ home patient being seen at [facility], and evaluated to Establish Care.     HPI   Pt visited in apartment of assisted living. Pt up in wheelchair, oriented x3, comfortable and denies pain. Daughter Monica present for visit along with son in law. Pt recently living at home independently until she had a fall and dislocated right hip. Pt had a total closed reduction and went to rehab. Pt had approximately three falls while at rehab and had been sent to ER for evaluation. Pt now residing at [facility] for additional assistance, safety, and therapy.     Pt wears glasses, and states vision is poor. Pt states she is due for an eye exam this coming December. Pt states hearing is good, and appears to hear normal tone of voice. Pt with own dentition, denies difficulty with chewing and swallowing. Pt states appetite is good, eats approximately 100% of meals. Pt denies headache, dizziness, congestion, runny nose and sore throat.     Pt denies chest pain and sob at rest and exertion. Pt denies voiding symptoms and constipation. Pt currently has one day remaining on Keflex for UTI. Pt states she is incontinent at night, and wears a pad. Pt denies sleep disturbances. Pt utilizing wheelchair for mobility. Pt was ambulating independently previous to right hip dislocation. Pt with a walker and rollator in room, looking forward to working with PT to increase strength, balance, endurance, and decrease risk for falls . Pt admits to sadness due to being lonely when living at home alone. Pt states \"I'm looking forward to socializing and making friends\". Sixteen minutes was spent discussing and implementing advanced directives with patient and daughter at assisted living. Different code statuses reviewed and explained in detail in regards to interventions that will take place in an event of cardiac or respiratory arrest. Pt elected DNR-CCA. All questions " answered, family/pt with no other concerns at this time.     Current Outpatient Medications on File Prior to Visit   Medication Sig Dispense Refill    acetaminophen (Tylenol) 325 mg tablet Take 2 tablets (650 mg) by mouth every 4 hours if needed for mild pain (1 - 3), headaches or fever (temp greater than 38.0 C).      alum-mag hydroxide-simeth (Mylanta) 200-200-20 mg/5 mL oral suspension Take 30 mL by mouth every 4 hours if needed for indigestion or heartburn.      amLODIPine (Norvasc) 10 mg tablet Take 1 tablet (10 mg) by mouth once daily. (Patient taking differently: Take 1 tablet (10 mg) by mouth once daily. Hold for SBP less than 120, and HR less than 60.) 90 tablet 3    aspirin 81 mg chewable tablet Chew 1 tablet (81 mg) once daily.      atenolol (Tenormin) 25 mg tablet Take 1 tablet (25 mg) by mouth 2 times a day. 25 mg 2x a day (Patient taking differently: Take 1 tablet (25 mg) by mouth 2 times a day. Hold for SBP less than 120, and HR less than 60.) 180 tablet 3    cholecalciferol (Vitamin D-3) 50 MCG (2000 UT) tablet Take 1 tablet (2,000 Units) by mouth once daily.      cyanocobalamin (Vitamin B-12) 1,000 mcg tablet Take 1 tablet (1,000 mcg) by mouth once daily.      donepezil (Aricept) 10 mg tablet TAKE ONE TABLET BY MOUTH DAILY AT BEDTIME AS DIRECTED 90 tablet 1    escitalopram (Lexapro) 5 mg tablet 1 tablet a day 90 tablet 3    gabapentin (Neurontin) 100 mg capsule TAKE 1 CAPSULE BY MOUTH IN THE MORNING AND THEN TAKE 2 CAPSULES AT NIGHT AS DIRECTED for 90 270 capsule 3    levothyroxine (Synthroid, Levoxyl) 25 mcg tablet Take 1 tablet (25 mcg) by mouth once daily in the morning. Take before meals. . for 90 90 tablet 3    magnesium hydroxide (Milk of Magnesia) 400 mg/5 mL suspension Take 30 mL by mouth once daily as needed for constipation (If no BM in 3 days.).      pantoprazole (ProtoNix) 40 mg EC tablet Take 1 tablet (40 mg) by mouth once daily. 90 tablet 3    rosuvastatin (Crestor) 40 mg tablet Take 1  tablet (40 mg) by mouth once daily. 90 tablet 3    [DISCONTINUED] traMADol (Ultram) 50 mg tablet Take 1 tablet (50 mg) by mouth every 6 hours if needed for moderate pain (4 - 6). (Patient not taking: Reported on 9/10/2024) 12 tablet 0     No current facility-administered medications on file prior to visit.        Review of Systems   Constitutional:  Positive for activity change. Negative for appetite change, chills, fatigue and fever.   HENT:  Negative for congestion, hearing loss and rhinorrhea.    Eyes:  Positive for visual disturbance.        Glasses   Respiratory:  Negative for cough and shortness of breath.    Cardiovascular:  Negative for chest pain, palpitations and leg swelling.   Gastrointestinal:  Negative for constipation, diarrhea, nausea and vomiting.   Genitourinary:  Negative for dysuria and frequency.   Musculoskeletal:  Positive for arthralgias and gait problem.        Utilizing wheelchair for mobility at this time.    Neurological:  Positive for weakness. Negative for dizziness and headaches.   Psychiatric/Behavioral:  Negative for sleep disturbance. The patient is not nervous/anxious.        Objective   BP 98/50 (BP Location: Left arm, Patient Position: Sitting, BP Cuff Size: Adult)   Pulse 62   Resp 16   SpO2 99%     Physical Exam  Constitutional:       General: She is awake. She is not in acute distress.     Appearance: Normal appearance. She is not ill-appearing.   HENT:      Head: Normocephalic.      Nose: No congestion or rhinorrhea.      Mouth/Throat:      Mouth: Mucous membranes are moist.   Eyes:      Conjunctiva/sclera: Conjunctivae normal.      Pupils: Pupils are equal, round, and reactive to light.   Cardiovascular:      Rate and Rhythm: Normal rate and regular rhythm.      Pulses: Normal pulses.      Heart sounds: Normal heart sounds.   Pulmonary:      Effort: Pulmonary effort is normal. No respiratory distress.      Breath sounds: Normal breath sounds. No wheezing or rhonchi.    Abdominal:      General: Bowel sounds are normal.      Palpations: Abdomen is soft.   Musculoskeletal:         General: Normal range of motion.      Cervical back: Normal range of motion.      Right lower leg: No edema.      Left lower leg: No edema.   Skin:     General: Skin is warm.      Capillary Refill: Capillary refill takes less than 2 seconds.             Comments: Skin tear to right elbow approximately 0.5 in length.    Neurological:      General: No focal deficit present.      Mental Status: She is alert and oriented to person, place, and time. Mental status is at baseline.   Psychiatric:         Attention and Perception: Attention normal.         Mood and Affect: Mood normal.         Speech: Speech normal.         Behavior: Behavior normal. Behavior is cooperative.         Thought Content: Thought content normal.         Cognition and Memory: Cognition and memory normal. Cognition is not impaired. Memory is not impaired.         Judgment: Judgment normal.         Assessment/Plan   Diagnoses and all orders for this visit:  Primary hypertension  Comments:  Hypotensive today, continue to monitor BP, parameters added.  Mixed hyperlipidemia  Comments:  Obtain Lipid Panel.  Other specified hypothyroidism  Comments:  Obtain TSH.  Vitamin D deficiency  Comments:  Obtain Vitamin D level.  B12 deficiency  Comments:  Obtain B12 level.  Mild vascular dementia without behavioral disturbance, psychotic disturbance, mood disturbance, or anxiety (Multi)  Comments:  Pt appropriate for assisted living setting without secured memory care.  Hip dislocation, right, initial encounter (Multi)  Comments:  Refer to PT/OT to eval and treat.  Primary osteoarthritis involving multiple joints  Non-traumatic rhabdomyolysis  Living in assisted living  Comments:  Facility chart, and medical records reviewed, physician orders reviewed & signed, BMP, CBC, TSH, Lipid, Vit B & D ordered. Collaboration with nursing.  Advanced directives,  counseling/discussion  Comments:  Code statuses reviewed in detail, DNR-CCA signed and placed in facility chart.

## 2024-09-12 ENCOUNTER — TELEPHONE (OUTPATIENT)
Dept: PRIMARY CARE | Facility: CLINIC | Age: 89
End: 2024-09-12
Payer: MEDICARE

## 2024-09-12 NOTE — TELEPHONE ENCOUNTER
Alyse calling to request verbal order for PT/OT eval and treatment order from provider for patient who resides at [facility]. Please Advise   Sarah Ramirez

## 2024-09-13 ENCOUNTER — TELEPHONE (OUTPATIENT)
Dept: PRIMARY CARE | Facility: CLINIC | Age: 89
End: 2024-09-13
Payer: MEDICARE

## 2024-09-13 PROCEDURE — G0180 MD CERTIFICATION HHA PATIENT: HCPCS

## 2024-09-14 NOTE — TELEPHONE ENCOUNTER
Noted. I was there today, and nursing did not report any concerns. Will follow up next week when in facility. Thank you.    No

## 2024-09-16 ENCOUNTER — TELEPHONE (OUTPATIENT)
Dept: PRIMARY CARE | Facility: CLINIC | Age: 89
End: 2024-09-16
Payer: MEDICARE

## 2024-09-20 ENCOUNTER — NURSING HOME VISIT (OUTPATIENT)
Dept: POST ACUTE CARE | Facility: EXTERNAL LOCATION | Age: 89
End: 2024-09-20
Payer: MEDICARE

## 2024-09-20 DIAGNOSIS — R44.1 HALLUCINATIONS, VISUAL: ICD-10-CM

## 2024-09-20 DIAGNOSIS — Z29.89 NEED FOR PROPHYLAXIS AGAINST URINARY TRACT INFECTION: Primary | ICD-10-CM

## 2024-09-20 PROCEDURE — 99349 HOME/RES VST EST MOD MDM 40: CPT

## 2024-09-20 RX ORDER — CRANBERRY FRUIT 450 MG
1 TABLET ORAL 2 TIMES DAILY
Qty: 90 TABLET | Refills: 3 | Status: SHIPPED | OUTPATIENT
Start: 2024-09-20

## 2024-09-20 NOTE — LETTER
"Patient: Mariaelena Tavarez  : 1935    Encounter Date: 2024    Subjective  Patient ID: Mariaelena Tavarez is a 89 y.o. female who is assisted living/ home patient being seen at [facility], and evaluated for UTI symptoms/right foot swelling.     UTI   Pertinent negatives include no chills, frequency, nausea or vomiting.      HPI:  Pt visited in apartment for reports of right foot swelling and hallucinations. Pt oriented x3, sitting on side of bed, comfortable and denies pain. Pt states swelling to right foot was present on Friday. Pt denies injury or any falls. Swelling has since resolved. Pt ambulating with rollator and independently in apartment without difficulty. Pt had reportedly been having hallucinations over the weekend. Daughter states she was on the phone with patient when they occurred. Daughter came to assisted living, and pt was \"talking to people as if they were there, it was really weird\". Daughter states pt with a hx of UTI, recently treated with keflex within last month. Daughter states she is keeping the fridge in apartment stocked with water. Pt denies hallucinations today, fever, chills, headache, dizziness. Denies chest pain and sob at rest/exertion. Pt denies voiding symptoms, and constipation. Daughter in agreement with treatment plan.     Current Outpatient Medications on File Prior to Visit   Medication Sig Dispense Refill   • acetaminophen (Tylenol) 325 mg tablet Take 2 tablets (650 mg) by mouth every 4 hours if needed for mild pain (1 - 3), headaches or fever (temp greater than 38.0 C).     • alum-mag hydroxide-simeth (Mylanta) 200-200-20 mg/5 mL oral suspension Take 30 mL by mouth every 4 hours if needed for indigestion or heartburn.     • amLODIPine (Norvasc) 10 mg tablet Take 1 tablet (10 mg) by mouth once daily. (Patient taking differently: Take 1 tablet (10 mg) by mouth once daily. Hold for SBP less than 120, and HR less than 60.) 90 tablet 3   • aspirin 81 mg chewable " tablet Chew 1 tablet (81 mg) once daily.     • atenolol (Tenormin) 25 mg tablet Take 1 tablet (25 mg) by mouth 2 times a day. 25 mg 2x a day (Patient taking differently: Take 1 tablet (25 mg) by mouth 2 times a day. Hold for SBP less than 120, and HR less than 60.) 180 tablet 3   • cholecalciferol (Vitamin D-3) 50 MCG (2000 UT) tablet Take 1 tablet (2,000 Units) by mouth once daily.     • donepezil (Aricept) 10 mg tablet TAKE ONE TABLET BY MOUTH DAILY AT BEDTIME AS DIRECTED 90 tablet 1   • escitalopram (Lexapro) 5 mg tablet 1 tablet a day 90 tablet 3   • gabapentin (Neurontin) 100 mg capsule TAKE 1 CAPSULE BY MOUTH IN THE MORNING AND THEN TAKE 2 CAPSULES AT NIGHT AS DIRECTED for 90 270 capsule 3   • levothyroxine (Synthroid, Levoxyl) 25 mcg tablet Take 1 tablet (25 mcg) by mouth once daily in the morning. Take before meals. . for 90 90 tablet 3   • magnesium hydroxide (Milk of Magnesia) 400 mg/5 mL suspension Take 30 mL by mouth once daily as needed for constipation (If no BM in 3 days.).     • pantoprazole (ProtoNix) 40 mg EC tablet Take 1 tablet (40 mg) by mouth once daily. 90 tablet 3   • rosuvastatin (Crestor) 40 mg tablet Take 1 tablet (40 mg) by mouth once daily. 90 tablet 3     No current facility-administered medications on file prior to visit.        Review of Systems   Constitutional:  Positive for activity change. Negative for appetite change, chills, fatigue and fever.   HENT:  Negative for congestion, hearing loss and rhinorrhea.    Eyes:  Positive for visual disturbance.        Glasses, reported hallucinations   Respiratory:  Negative for cough and shortness of breath.    Cardiovascular:  Negative for chest pain, palpitations and leg swelling.   Gastrointestinal:  Negative for constipation, diarrhea, nausea and vomiting.   Genitourinary:  Negative for dysuria and frequency.   Musculoskeletal:  Positive for arthralgias and gait problem.        Utilizing wheelchair for mobility at this time.     Neurological:  Positive for weakness. Negative for dizziness and headaches.   Psychiatric/Behavioral:  Negative for sleep disturbance. The patient is not nervous/anxious.        Objective  There were no vitals taken for this visit.    Physical Exam  Constitutional:       General: She is awake. She is not in acute distress.     Appearance: Normal appearance. She is not ill-appearing.   HENT:      Head: Normocephalic.      Nose: No congestion or rhinorrhea.      Mouth/Throat:      Mouth: Mucous membranes are moist.   Eyes:      Conjunctiva/sclera: Conjunctivae normal.      Pupils: Pupils are equal, round, and reactive to light.   Pulmonary:      Effort: Pulmonary effort is normal. No respiratory distress.   Abdominal:      General: Bowel sounds are normal.      Palpations: Abdomen is soft.   Musculoskeletal:         General: Normal range of motion.      Cervical back: Normal range of motion.      Right lower leg: No edema.      Left lower leg: No edema.   Skin:     General: Skin is warm.      Capillary Refill: Capillary refill takes less than 2 seconds.   Neurological:      General: No focal deficit present.      Mental Status: She is alert and oriented to person, place, and time. Mental status is at baseline.   Psychiatric:         Attention and Perception: Attention normal.         Mood and Affect: Mood normal.         Speech: Speech normal.         Behavior: Behavior normal. Behavior is cooperative.         Thought Content: Thought content normal.         Cognition and Memory: Cognition and memory normal. Cognition is not impaired. Memory is not impaired.         Judgment: Judgment normal.         Assessment/Plan  Diagnoses and all orders for this visit:  Need for prophylaxis against urinary tract infection  -     cranberry fruit (cranberry) 450 mg tablet; Take 1 tablet by mouth 2 times a day.  -     d-mannose 500 mg capsule; Take 1 capsule by mouth once daily.  Hallucinations, visual  Comments:  Obtain UA  Lives  in assisted living facility  Comments:  Facility chart and medical records reviewed, medication reconcilation and collaboration with nursing.  *BMP, CBC, Lipid Panel, and TSH reviewed that were obtained at assisted living. Results wnl.         Electronically Signed By: DESTINEE Bhatia   9/22/24  9:37 PM

## 2024-09-22 ASSESSMENT — ENCOUNTER SYMPTOMS
NAUSEA: 0
APPETITE CHANGE: 0
PALPITATIONS: 0
FATIGUE: 0
DYSURIA: 0
HEADACHES: 0
VOMITING: 0
ARTHRALGIAS: 1
COUGH: 0
CONSTIPATION: 0
RHINORRHEA: 0
FEVER: 0
WEAKNESS: 1
ACTIVITY CHANGE: 1
NERVOUS/ANXIOUS: 0
SHORTNESS OF BREATH: 0
DIARRHEA: 0
FREQUENCY: 0
DIZZINESS: 0
CHILLS: 0
SLEEP DISTURBANCE: 0

## 2024-09-22 NOTE — PROGRESS NOTES
"Subjective   Patient ID: Mariaelena Tavarez is a 89 y.o. female who is assisted living/ home patient being seen at [facility], and evaluated for UTI symptoms/right foot swelling.     UTI   Pertinent negatives include no chills, frequency, nausea or vomiting.      HPI:  Pt visited in apartment for reports of right foot swelling and hallucinations. Pt oriented x3, sitting on side of bed, comfortable and denies pain. Pt states swelling to right foot was present on Friday. Pt denies injury or any falls. Swelling has since resolved. Pt ambulating with rollator and independently in apartment without difficulty. Pt had reportedly been having hallucinations over the weekend. Daughter states she was on the phone with patient when they occurred. Daughter came to assisted living, and pt was \"talking to people as if they were there, it was really weird\". Daughter states pt with a hx of UTI, recently treated with keflex within last month. Daughter states she is keeping the fridge in apartment stocked with water. Pt denies hallucinations today, fever, chills, headache, dizziness. Denies chest pain and sob at rest/exertion. Pt denies voiding symptoms, and constipation. Daughter in agreement with treatment plan.     Current Outpatient Medications on File Prior to Visit   Medication Sig Dispense Refill    acetaminophen (Tylenol) 325 mg tablet Take 2 tablets (650 mg) by mouth every 4 hours if needed for mild pain (1 - 3), headaches or fever (temp greater than 38.0 C).      alum-mag hydroxide-simeth (Mylanta) 200-200-20 mg/5 mL oral suspension Take 30 mL by mouth every 4 hours if needed for indigestion or heartburn.      amLODIPine (Norvasc) 10 mg tablet Take 1 tablet (10 mg) by mouth once daily. (Patient taking differently: Take 1 tablet (10 mg) by mouth once daily. Hold for SBP less than 120, and HR less than 60.) 90 tablet 3    aspirin 81 mg chewable tablet Chew 1 tablet (81 mg) once daily.      atenolol (Tenormin) 25 mg " tablet Take 1 tablet (25 mg) by mouth 2 times a day. 25 mg 2x a day (Patient taking differently: Take 1 tablet (25 mg) by mouth 2 times a day. Hold for SBP less than 120, and HR less than 60.) 180 tablet 3    cholecalciferol (Vitamin D-3) 50 MCG (2000 UT) tablet Take 1 tablet (2,000 Units) by mouth once daily.      donepezil (Aricept) 10 mg tablet TAKE ONE TABLET BY MOUTH DAILY AT BEDTIME AS DIRECTED 90 tablet 1    escitalopram (Lexapro) 5 mg tablet 1 tablet a day 90 tablet 3    gabapentin (Neurontin) 100 mg capsule TAKE 1 CAPSULE BY MOUTH IN THE MORNING AND THEN TAKE 2 CAPSULES AT NIGHT AS DIRECTED for 90 270 capsule 3    levothyroxine (Synthroid, Levoxyl) 25 mcg tablet Take 1 tablet (25 mcg) by mouth once daily in the morning. Take before meals. . for 90 90 tablet 3    magnesium hydroxide (Milk of Magnesia) 400 mg/5 mL suspension Take 30 mL by mouth once daily as needed for constipation (If no BM in 3 days.).      pantoprazole (ProtoNix) 40 mg EC tablet Take 1 tablet (40 mg) by mouth once daily. 90 tablet 3    rosuvastatin (Crestor) 40 mg tablet Take 1 tablet (40 mg) by mouth once daily. 90 tablet 3     No current facility-administered medications on file prior to visit.        Review of Systems   Constitutional:  Positive for activity change. Negative for appetite change, chills, fatigue and fever.   HENT:  Negative for congestion, hearing loss and rhinorrhea.    Eyes:  Positive for visual disturbance.        Glasses, reported hallucinations   Respiratory:  Negative for cough and shortness of breath.    Cardiovascular:  Negative for chest pain, palpitations and leg swelling.   Gastrointestinal:  Negative for constipation, diarrhea, nausea and vomiting.   Genitourinary:  Negative for dysuria and frequency.   Musculoskeletal:  Positive for arthralgias and gait problem.        Utilizing wheelchair for mobility at this time.    Neurological:  Positive for weakness. Negative for dizziness and headaches.    Psychiatric/Behavioral:  Negative for sleep disturbance. The patient is not nervous/anxious.        Objective   There were no vitals taken for this visit.    Physical Exam  Constitutional:       General: She is awake. She is not in acute distress.     Appearance: Normal appearance. She is not ill-appearing.   HENT:      Head: Normocephalic.      Nose: No congestion or rhinorrhea.      Mouth/Throat:      Mouth: Mucous membranes are moist.   Eyes:      Conjunctiva/sclera: Conjunctivae normal.      Pupils: Pupils are equal, round, and reactive to light.   Pulmonary:      Effort: Pulmonary effort is normal. No respiratory distress.   Abdominal:      General: Bowel sounds are normal.      Palpations: Abdomen is soft.   Musculoskeletal:         General: Normal range of motion.      Cervical back: Normal range of motion.      Right lower leg: No edema.      Left lower leg: No edema.   Skin:     General: Skin is warm.      Capillary Refill: Capillary refill takes less than 2 seconds.   Neurological:      General: No focal deficit present.      Mental Status: She is alert and oriented to person, place, and time. Mental status is at baseline.   Psychiatric:         Attention and Perception: Attention normal.         Mood and Affect: Mood normal.         Speech: Speech normal.         Behavior: Behavior normal. Behavior is cooperative.         Thought Content: Thought content normal.         Cognition and Memory: Cognition and memory normal. Cognition is not impaired. Memory is not impaired.         Judgment: Judgment normal.         Assessment/Plan   Diagnoses and all orders for this visit:  Need for prophylaxis against urinary tract infection  -     cranberry fruit (cranberry) 450 mg tablet; Take 1 tablet by mouth 2 times a day.  -     d-mannose 500 mg capsule; Take 1 capsule by mouth once daily.  Hallucinations, visual  Comments:  Obtain UA  Lives in assisted living facility  Comments:  Facility chart and medical records  reviewed, medication reconcilation and collaboration with nursing.  *BMP, CBC, Lipid Panel, and TSH reviewed that were obtained at assisted living. Results wnl.

## 2024-09-23 ENCOUNTER — APPOINTMENT (OUTPATIENT)
Dept: RADIOLOGY | Facility: HOSPITAL | Age: 89
End: 2024-09-23
Payer: MEDICARE

## 2024-09-23 ENCOUNTER — HOSPITAL ENCOUNTER (EMERGENCY)
Facility: HOSPITAL | Age: 89
Discharge: HOME | End: 2024-09-23
Payer: MEDICARE

## 2024-09-23 VITALS
RESPIRATION RATE: 15 BRPM | HEIGHT: 60 IN | TEMPERATURE: 98.2 F | SYSTOLIC BLOOD PRESSURE: 140 MMHG | BODY MASS INDEX: 17.7 KG/M2 | DIASTOLIC BLOOD PRESSURE: 66 MMHG | WEIGHT: 90.17 LBS | OXYGEN SATURATION: 97 % | HEART RATE: 70 BPM

## 2024-09-23 DIAGNOSIS — S51.012A SKIN TEAR OF LEFT ELBOW WITHOUT COMPLICATION, INITIAL ENCOUNTER: ICD-10-CM

## 2024-09-23 DIAGNOSIS — M54.50 ACUTE LOW BACK PAIN, UNSPECIFIED BACK PAIN LATERALITY, UNSPECIFIED WHETHER SCIATICA PRESENT: ICD-10-CM
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
## 2024-09-23 DIAGNOSIS — S09.90XA HEAD INJURY, INITIAL ENCOUNTER: ICD-10-CM

## 2024-09-23 PROCEDURE — 73080 X-RAY EXAM OF ELBOW: CPT | Mod: LEFT SIDE | Performed by: RADIOLOGY

## 2024-09-23 PROCEDURE — 72125 CT NECK SPINE W/O DYE: CPT | Performed by: RADIOLOGY

## 2024-09-23 PROCEDURE — 73080 X-RAY EXAM OF ELBOW: CPT | Mod: LT

## 2024-09-23 PROCEDURE — 70450 CT HEAD/BRAIN W/O DYE: CPT

## 2024-09-23 PROCEDURE — 99285 EMERGENCY DEPT VISIT HI MDM: CPT | Mod: 25

## 2024-09-23 PROCEDURE — 70450 CT HEAD/BRAIN W/O DYE: CPT | Performed by: RADIOLOGY

## 2024-09-23 PROCEDURE — 2500000001 HC RX 250 WO HCPCS SELF ADMINISTERED DRUGS (ALT 637 FOR MEDICARE OP): Performed by: PHYSICIAN ASSISTANT

## 2024-09-23 PROCEDURE — 72125 CT NECK SPINE W/O DYE: CPT

## 2024-09-23 RX ORDER — TRAMADOL HYDROCHLORIDE 50 MG/1
50 TABLET ORAL ONCE
Status: COMPLETED | OUTPATIENT
Start: 2024-09-23 | End: 2024-09-23

## 2024-09-23 RX ORDER — TRAMADOL HYDROCHLORIDE 50 MG/1
50 TABLET ORAL EVERY 6 HOURS PRN
Qty: 10 TABLET | Refills: 0 | Status: SHIPPED | OUTPATIENT
Start: 2024-09-23

## 2024-09-23 RX ORDER — ACETAMINOPHEN 325 MG/1
975 TABLET ORAL ONCE
Status: COMPLETED | OUTPATIENT
Start: 2024-09-23 | End: 2024-09-23

## 2024-09-23 ASSESSMENT — PAIN DESCRIPTION - PAIN TYPE: TYPE: ACUTE PAIN

## 2024-09-23 ASSESSMENT — COLUMBIA-SUICIDE SEVERITY RATING SCALE - C-SSRS
6. HAVE YOU EVER DONE ANYTHING, STARTED TO DO ANYTHING, OR PREPARED TO DO ANYTHING TO END YOUR LIFE?: NO
1. IN THE PAST MONTH, HAVE YOU WISHED YOU WERE DEAD OR WISHED YOU COULD GO TO SLEEP AND NOT WAKE UP?: NO
2. HAVE YOU ACTUALLY HAD ANY THOUGHTS OF KILLING YOURSELF?: NO

## 2024-09-23 ASSESSMENT — PAIN - FUNCTIONAL ASSESSMENT: PAIN_FUNCTIONAL_ASSESSMENT: 0-10

## 2024-09-23 ASSESSMENT — PAIN DESCRIPTION - LOCATION: LOCATION: BACK

## 2024-09-23 ASSESSMENT — PAIN SCALES - GENERAL: PAINLEVEL_OUTOF10: 5 - MODERATE PAIN

## 2024-09-24 NOTE — ED PROVIDER NOTES
HPI   Chief Complaint   Patient presents with    Fall     Pt lives at [facility]. She states that her slipper got caught on the carpet causing her to fall backwards. Pt did hit her head on the window sill. Pt denies any LOC. Pt states she is on aspirin. Pt complaining of back pain.        89-year-old female presented emergency department with a chief complaint of mechanical trip and fall.  From assisted living facility.  Struck head no loss of consciousness not anticoagulated skin tear to left shoulder up-to-date tetanus.  No other injury or trauma.  No other complaint.              Patient History   Past Medical History:   Diagnosis Date    Age-related nuclear cataract of left eye 09/03/2023    Anxiety 09/03/2023    Benign paroxysmal positional vertigo 09/03/2023    Coronary artery disease 09/03/2023    Gastroesophageal reflux disease 09/03/2023    Hip dislocation, right, initial encounter (Multi) 08/23/2024    Hyperlipidemia 09/03/2023    Osteoarthritis 09/03/2023    Unspecified cataract     Cataract of right eye, unspecified cataract type    Urinary incontinence 09/03/2023    Venous thrombosis 09/03/2023    Vitamin D deficiency 09/03/2023     Past Surgical History:   Procedure Laterality Date    CATARACT EXTRACTION  09/04/2018    Cataract Surgery    CT GUIDED IMAGING FOR NEEDLE PLACEMENT  9/27/2016    CT GUIDED IMAGING FOR NEEDLE PLACEMENT [facility]     Family History   Problem Relation Name Age of Onset    Hypertension Mother      Stroke Mother      Arthritis Father      Parkinsonism Sister      Dementia Sister      Cancer Maternal Grandfather      Heart disease Other [facility]     Hypertension Other [facility]     Stroke Other [facility]     Hypertension Sibling      Stroke Sibling       Social History     Tobacco Use    Smoking status: Never    Smokeless tobacco: Never   Substance Use Topics    Alcohol use: Never    Drug use: Never       Physical Exam   ED Triage Vitals [09/23/24 1851]   Temperature Heart  Rate Respirations BP   36.8 °C (98.2 °F) 70 15 140/66      Pulse Ox Temp Source Heart Rate Source Patient Position   97 % Oral Monitor Lying      BP Location FiO2 (%)     Right arm --       Physical Exam  Vitals and nursing note reviewed.   Constitutional:       Appearance: Normal appearance.   HENT:      Head: Normocephalic.      Nose: Nose normal.      Mouth/Throat:      Mouth: Mucous membranes are moist.   Cardiovascular:      Pulses: Normal pulses.   Pulmonary:      Effort: Pulmonary effort is normal.   Abdominal:      General: Abdomen is flat.   Musculoskeletal:         General: Normal range of motion.      Cervical back: Normal range of motion.   Skin:     General: Skin is warm.   Neurological:      General: No focal deficit present.      Mental Status: She is alert and oriented to person, place, and time.   Psychiatric:         Mood and Affect: Mood normal.           ED Course & MDM   Diagnoses as of 09/23/24 2047   Fall, initial encounter   Head injury, initial encounter   Acute low back pain, unspecified back pain laterality, unspecified whether sciatica present   Skin tear of left elbow without complication, initial encounter                 No data recorded     [place] Coma Scale Score: 15 (09/23/24 1854 : Mame Freitas RN)                           Medical Decision Making  I have seen and evaluated this patient.  Physician available for consultation.  Vital signs have been reviewed.  All laboratory and diagnostic imaging is reviewed by myself and interpreted by myself unless otherwise stated.  Additionally imaging is interpreted by radiologist.    Negative CT brain and C-spine.  Negative elbow.  Skin tear left elbow wound care by nursing.  Pain controlled.  Released in stable condition with outpatient follow-up pain control.  On reevaluation patient complains of neck pain.  Does not want imaging.  Consideration of imaging for compression fracture.  Patient able to bear weight and ambulate.  Ultimately  shared decision will defer to outpatient if pain continues.    Labs Reviewed - No data to display  CT head wo IV contrast   Final Result   No evidence of acute cortical infarct or intracranial hemorrhage.        No evidence of intracranial hemorrhage or displaced skull fracture.        MACRO:   None        Signed by: Alin Mcdonald 9/23/2024 8:06 PM   Dictation workstation:   [id number]      CT cervical spine wo IV contrast   Final Result   No evidence for an acute fracture or subluxation of the cervical   spine.        MACRO:   None        Signed by: Alin Mcdonald 9/23/2024 8:11 PM   Dictation workstation:   [id number]      XR elbow left 3+ views   Final Result   No acute fracture or dislocation.             MACRO:   None        Signed by: Alin Mcdonald 9/23/2024 7:35 PM   Dictation workstation:   [id number]        Medications   traMADol (Ultram) tablet 50 mg (has no administration in time range)   acetaminophen (Tylenol) tablet 975 mg (has no administration in time range)     New Prescriptions    TRAMADOL (ULTRAM) 50 MG TABLET    Take 1 tablet (50 mg) by mouth every 6 hours if needed for severe pain (7 - 10) for up to 10 doses.         Procedure  Procedures     Lucian Taylor PA-C  09/23/24 2042

## 2024-09-26 ENCOUNTER — HOSPITAL ENCOUNTER (EMERGENCY)
Facility: HOSPITAL | Age: 89
Discharge: OTHER INSTITUTION | End: 2024-09-27
Attending: EMERGENCY MEDICINE
Payer: MEDICARE

## 2024-09-26 ENCOUNTER — APPOINTMENT (OUTPATIENT)
Dept: RADIOLOGY | Facility: HOSPITAL | Age: 89
End: 2024-09-26
Payer: MEDICARE

## 2024-09-26 ENCOUNTER — TELEPHONE (OUTPATIENT)
Dept: POST ACUTE CARE | Facility: EXTERNAL LOCATION | Age: 89
End: 2024-09-26
Payer: MEDICARE

## 2024-09-26 ENCOUNTER — APPOINTMENT (OUTPATIENT)
Dept: CARDIOLOGY | Facility: HOSPITAL | Age: 89
End: 2024-09-26
Payer: MEDICARE

## 2024-09-26 DIAGNOSIS — F03.92 DEMENTIA WITH PSYCHOTIC DISTURBANCE, UNSPECIFIED DEMENTIA SEVERITY, UNSPECIFIED DEMENTIA TYPE (MULTI): ICD-10-CM

## 2024-09-26 DIAGNOSIS — R44.3 HALLUCINATIONS: Primary | ICD-10-CM

## 2024-09-26 LAB
ALBUMIN SERPL BCP-MCNC: 3.5 G/DL (ref 3.4–5)
ALP SERPL-CCNC: 112 U/L (ref 33–136)
ALT SERPL W P-5'-P-CCNC: 12 U/L (ref 7–45)
AMMONIA PLAS-SCNC: 23 UMOL/L (ref 16–53)
AMPHETAMINES UR QL SCN: NORMAL
ANION GAP SERPL CALCULATED.3IONS-SCNC: 11 MMOL/L (ref 10–20)
APPEARANCE UR: CLEAR
AST SERPL W P-5'-P-CCNC: 13 U/L (ref 9–39)
BARBITURATES UR QL SCN: NORMAL
BASOPHILS # BLD AUTO: 0.02 X10*3/UL (ref 0–0.1)
BASOPHILS NFR BLD AUTO: 0.3 %
BENZODIAZ UR QL SCN: NORMAL
BILIRUB SERPL-MCNC: 0.9 MG/DL (ref 0–1.2)
BILIRUB UR STRIP.AUTO-MCNC: NEGATIVE MG/DL
BUN SERPL-MCNC: 18 MG/DL (ref 6–23)
BZE UR QL SCN: NORMAL
CALCIUM SERPL-MCNC: 8.4 MG/DL (ref 8.6–10.3)
CANNABINOIDS UR QL SCN: NORMAL
CHLORIDE SERPL-SCNC: 104 MMOL/L (ref 98–107)
CO2 SERPL-SCNC: 27 MMOL/L (ref 21–32)
COLOR UR: ABNORMAL
CREAT SERPL-MCNC: 0.56 MG/DL (ref 0.5–1.05)
EGFRCR SERPLBLD CKD-EPI 2021: 87 ML/MIN/1.73M*2
EOSINOPHIL # BLD AUTO: 0.03 X10*3/UL (ref 0–0.4)
EOSINOPHIL NFR BLD AUTO: 0.4 %
ERYTHROCYTE [DISTWIDTH] IN BLOOD BY AUTOMATED COUNT: 13.2 % (ref 11.5–14.5)
FENTANYL+NORFENTANYL UR QL SCN: NORMAL
GLUCOSE SERPL-MCNC: 126 MG/DL (ref 74–99)
GLUCOSE UR STRIP.AUTO-MCNC: NORMAL MG/DL
HCT VFR BLD AUTO: 31.8 % (ref 36–46)
HGB BLD-MCNC: 10.1 G/DL (ref 12–16)
IMM GRANULOCYTES # BLD AUTO: 0.03 X10*3/UL (ref 0–0.5)
IMM GRANULOCYTES NFR BLD AUTO: 0.4 % (ref 0–0.9)
KETONES UR STRIP.AUTO-MCNC: NEGATIVE MG/DL
LEUKOCYTE ESTERASE UR QL STRIP.AUTO: NEGATIVE
LYMPHOCYTES # BLD AUTO: 1.06 X10*3/UL (ref 0.8–3)
LYMPHOCYTES NFR BLD AUTO: 14.5 %
MAGNESIUM SERPL-MCNC: 2.06 MG/DL (ref 1.6–2.4)
MCH RBC QN AUTO: 31.4 PG (ref 26–34)
MCHC RBC AUTO-ENTMCNC: 31.8 G/DL (ref 32–36)
MCV RBC AUTO: 99 FL (ref 80–100)
METHADONE UR QL SCN: NORMAL
MONOCYTES # BLD AUTO: 1.03 X10*3/UL (ref 0.05–0.8)
MONOCYTES NFR BLD AUTO: 14.1 %
NEUTROPHILS # BLD AUTO: 5.12 X10*3/UL (ref 1.6–5.5)
NEUTROPHILS NFR BLD AUTO: 70.3 %
NITRITE UR QL STRIP.AUTO: NEGATIVE
NRBC BLD-RTO: 0 /100 WBCS (ref 0–0)
OPIATES UR QL SCN: NORMAL
OXYCODONE+OXYMORPHONE UR QL SCN: NORMAL
PCP UR QL SCN: NORMAL
PH UR STRIP.AUTO: 7 [PH]
PLATELET # BLD AUTO: 141 X10*3/UL (ref 150–450)
POTASSIUM SERPL-SCNC: 3.4 MMOL/L (ref 3.5–5.3)
PROT SERPL-MCNC: 5.8 G/DL (ref 6.4–8.2)
PROT UR STRIP.AUTO-MCNC: ABNORMAL MG/DL
RBC # BLD AUTO: 3.22 X10*6/UL (ref 4–5.2)
RBC # UR STRIP.AUTO: ABNORMAL /UL
RBC #/AREA URNS AUTO: NORMAL /HPF
SARS-COV-2 RNA RESP QL NAA+PROBE: NOT DETECTED
SODIUM SERPL-SCNC: 139 MMOL/L (ref 136–145)
SP GR UR STRIP.AUTO: 1.02
UROBILINOGEN UR STRIP.AUTO-MCNC: ABNORMAL MG/DL
WBC # BLD AUTO: 7.3 X10*3/UL (ref 4.4–11.3)
WBC #/AREA URNS AUTO: NORMAL /HPF

## 2024-09-26 PROCEDURE — 84075 ASSAY ALKALINE PHOSPHATASE: CPT

## 2024-09-26 PROCEDURE — 36415 COLL VENOUS BLD VENIPUNCTURE: CPT

## 2024-09-26 PROCEDURE — 90785 PSYTX COMPLEX INTERACTIVE: CPT

## 2024-09-26 PROCEDURE — 70450 CT HEAD/BRAIN W/O DYE: CPT | Performed by: SURGERY

## 2024-09-26 PROCEDURE — 72125 CT NECK SPINE W/O DYE: CPT | Performed by: SURGERY

## 2024-09-26 PROCEDURE — 70450 CT HEAD/BRAIN W/O DYE: CPT

## 2024-09-26 PROCEDURE — 80307 DRUG TEST PRSMV CHEM ANLYZR: CPT

## 2024-09-26 PROCEDURE — 81001 URINALYSIS AUTO W/SCOPE: CPT | Mod: 59

## 2024-09-26 PROCEDURE — 71045 X-RAY EXAM CHEST 1 VIEW: CPT

## 2024-09-26 PROCEDURE — 96360 HYDRATION IV INFUSION INIT: CPT

## 2024-09-26 PROCEDURE — 2500000001 HC RX 250 WO HCPCS SELF ADMINISTERED DRUGS (ALT 637 FOR MEDICARE OP)

## 2024-09-26 PROCEDURE — 82140 ASSAY OF AMMONIA: CPT

## 2024-09-26 PROCEDURE — 83735 ASSAY OF MAGNESIUM: CPT

## 2024-09-26 PROCEDURE — 90839 PSYTX CRISIS INITIAL 60 MIN: CPT

## 2024-09-26 PROCEDURE — 73502 X-RAY EXAM HIP UNI 2-3 VIEWS: CPT | Mod: LEFT SIDE | Performed by: RADIOLOGY

## 2024-09-26 PROCEDURE — 85025 COMPLETE CBC W/AUTO DIFF WBC: CPT

## 2024-09-26 PROCEDURE — 87635 SARS-COV-2 COVID-19 AMP PRB: CPT

## 2024-09-26 PROCEDURE — 71045 X-RAY EXAM CHEST 1 VIEW: CPT | Performed by: RADIOLOGY

## 2024-09-26 PROCEDURE — 73502 X-RAY EXAM HIP UNI 2-3 VIEWS: CPT | Mod: LT

## 2024-09-26 PROCEDURE — 2500000004 HC RX 250 GENERAL PHARMACY W/ HCPCS (ALT 636 FOR OP/ED)

## 2024-09-26 PROCEDURE — 93005 ELECTROCARDIOGRAM TRACING: CPT

## 2024-09-26 PROCEDURE — 99285 EMERGENCY DEPT VISIT HI MDM: CPT | Mod: 25

## 2024-09-26 PROCEDURE — 72125 CT NECK SPINE W/O DYE: CPT

## 2024-09-26 RX ORDER — ACETAMINOPHEN 325 MG/1
975 TABLET ORAL ONCE
Status: COMPLETED | OUTPATIENT
Start: 2024-09-26 | End: 2024-09-26

## 2024-09-26 RX ORDER — DONEPEZIL HYDROCHLORIDE 5 MG/1
10 TABLET, ORALLY DISINTEGRATING ORAL NIGHTLY
Status: DISCONTINUED | OUTPATIENT
Start: 2024-09-26 | End: 2024-09-26

## 2024-09-26 RX ORDER — PANTOPRAZOLE SODIUM 40 MG/1
40 TABLET, DELAYED RELEASE ORAL
Status: DISCONTINUED | OUTPATIENT
Start: 2024-09-27 | End: 2024-09-27 | Stop reason: HOSPADM

## 2024-09-26 RX ORDER — GABAPENTIN 100 MG/1
200 CAPSULE ORAL NIGHTLY
Status: DISCONTINUED | OUTPATIENT
Start: 2024-09-26 | End: 2024-09-27 | Stop reason: HOSPADM

## 2024-09-26 RX ORDER — LEVOTHYROXINE SODIUM 25 UG/1
25 TABLET ORAL
Status: DISCONTINUED | OUTPATIENT
Start: 2024-09-27 | End: 2024-09-27 | Stop reason: HOSPADM

## 2024-09-26 RX ORDER — DONEPEZIL HYDROCHLORIDE 10 MG/1
10 TABLET, FILM COATED ORAL NIGHTLY
Status: DISCONTINUED | OUTPATIENT
Start: 2024-09-26 | End: 2024-09-27 | Stop reason: HOSPADM

## 2024-09-26 RX ORDER — ROSUVASTATIN CALCIUM 20 MG/1
40 TABLET, COATED ORAL NIGHTLY
Status: DISCONTINUED | OUTPATIENT
Start: 2024-09-26 | End: 2024-09-27 | Stop reason: HOSPADM

## 2024-09-26 RX ORDER — ESCITALOPRAM OXALATE 10 MG/1
5 TABLET ORAL DAILY
Status: DISCONTINUED | OUTPATIENT
Start: 2024-09-27 | End: 2024-09-27 | Stop reason: HOSPADM

## 2024-09-26 RX ORDER — AMLODIPINE BESYLATE 5 MG/1
10 TABLET ORAL DAILY
Status: DISCONTINUED | OUTPATIENT
Start: 2024-09-27 | End: 2024-09-27 | Stop reason: HOSPADM

## 2024-09-26 RX ADMIN — SODIUM CHLORIDE 500 ML: 900 INJECTION, SOLUTION INTRAVENOUS at 19:31

## 2024-09-26 RX ADMIN — ACETAMINOPHEN 975 MG: 325 TABLET ORAL at 19:31

## 2024-09-26 SDOH — HEALTH STABILITY: MENTAL HEALTH: SUICIDAL BEHAVIOR (LIFETIME): NO

## 2024-09-26 SDOH — ECONOMIC STABILITY: HOUSING INSECURITY: FEELS SAFE LIVING IN HOME: YES

## 2024-09-26 SDOH — HEALTH STABILITY: MENTAL HEALTH: ANXIETY SYMPTOMS: NO PROBLEMS REPORTED OR OBSERVED.

## 2024-09-26 SDOH — ECONOMIC STABILITY: GENERAL: FINANCIAL CONCERNS: OTHER (COMMENT)

## 2024-09-26 SDOH — HEALTH STABILITY: MENTAL HEALTH: IN THE PAST FEW WEEKS, HAVE YOU WISHED YOU WERE DEAD?: NO

## 2024-09-26 SDOH — HEALTH STABILITY: MENTAL HEALTH: WISH TO BE DEAD (PAST 1 MONTH): NO

## 2024-09-26 SDOH — HEALTH STABILITY: MENTAL HEALTH: NON-SPECIFIC ACTIVE SUICIDAL THOUGHTS (PAST 1 MONTH): NO

## 2024-09-26 SDOH — HEALTH STABILITY: MENTAL HEALTH: DEPRESSION SYMPTOMS: NO PROBLEMS REPORTED OR OBSERVED.;FEELINGS OF HELPLESSNESS;IMPAIRED CONCENTRATION;SLEEP DISTURBANCE

## 2024-09-26 ASSESSMENT — PAIN SCALES - GENERAL
PAINLEVEL_OUTOF10: 0 - NO PAIN
PAINLEVEL_OUTOF10: 0 - NO PAIN

## 2024-09-26 ASSESSMENT — PAIN - FUNCTIONAL ASSESSMENT
PAIN_FUNCTIONAL_ASSESSMENT: 0-10
PAIN_FUNCTIONAL_ASSESSMENT: 0-10

## 2024-09-26 ASSESSMENT — LIFESTYLE VARIABLES
PRESCIPTION_ABUSE_PAST_12_MONTHS: NO
SUBSTANCE_ABUSE_PAST_12_MONTHS: NO

## 2024-09-26 NOTE — ED PROVIDER NOTES
"HPI   Chief Complaint   Patient presents with    Fall     Patient presents from [facility] for falls and not acting appropriate. Per EMS family has had 2 falls in the last several days. Patient states she remembers the falls. She states she tripped. Family went to see patient today and were concerned that she is not acting approp. Patient was seeing people that were not there. Patient states she had one of her \"crazy dreams\" and that is what caused the hallucinations.  Patient is alert and oriented at this time. Able to answer questions approprietly. S       HPI  Patient is an 89-year-old female brought in by EMS from [facility] for evaluation of a fall as well as altered mental status.  Per family, patient has not been acting herself recently over the past month and has had several falls over the last month.  She has gotten increasingly worse over the past week and family is endorsing that the patient is having hallucinations at this time.  They state that she was having hallucinations earlier in the month but it was attributed to a urinary tract infection which was treated and they state her hallucinations did get better.  Patient did fall in her facility today and is endorsing back and hip pain.  She otherwise denies chest pain, shortness of breath, abdominal pain, nausea, vomiting.  Denies urinary symptoms.  Denies fever or chills.  No other acute complaints.      Patient History   Past Medical History:   Diagnosis Date    Age-related nuclear cataract of left eye 09/03/2023    Anxiety 09/03/2023    Benign paroxysmal positional vertigo 09/03/2023    Coronary artery disease 09/03/2023    Gastroesophageal reflux disease 09/03/2023    Hip dislocation, right, initial encounter (Multi) 08/23/2024    Hyperlipidemia 09/03/2023    Osteoarthritis 09/03/2023    Unspecified cataract     Cataract of right eye, unspecified cataract type    Urinary incontinence 09/03/2023    Venous thrombosis " 09/03/2023    Vitamin D deficiency 09/03/2023     Past Surgical History:   Procedure Laterality Date    CATARACT EXTRACTION  09/04/2018    Cataract Surgery    CT GUIDED IMAGING FOR NEEDLE PLACEMENT  9/27/2016    CT GUIDED IMAGING FOR NEEDLE PLACEMENT [facility]     Family History   Problem Relation Name Age of Onset    Hypertension Mother      Stroke Mother      Arthritis Father      Parkinsonism Sister      Dementia Sister      Cancer Maternal Grandfather      Heart disease Other ukn     Hypertension Other ukn     Stroke Other ukn     Hypertension Sibling      Stroke Sibling       Social History     Tobacco Use    Smoking status: Never    Smokeless tobacco: Never   Substance Use Topics    Alcohol use: Never    Drug use: Never       Physical Exam   ED Triage Vitals [09/26/24 1905]   Temperature Heart Rate Respirations BP   36.8 °C (98.2 °F) 67 15 146/61      Pulse Ox Temp Source Heart Rate Source Patient Position   98 % Oral Monitor Lying      BP Location FiO2 (%)     Right arm --       Physical Exam  Vitals and nursing note reviewed.   Constitutional:       General: She is not in acute distress.     Appearance: She is well-developed.      Comments: Frail woman resting in hospital bed in no apparent distress   HENT:      Head: Normocephalic and atraumatic.   Eyes:      Conjunctiva/sclera: Conjunctivae normal.   Cardiovascular:      Rate and Rhythm: Normal rate and regular rhythm.      Heart sounds: No murmur heard.  Pulmonary:      Effort: Pulmonary effort is normal. No respiratory distress.      Breath sounds: Normal breath sounds.   Abdominal:      Palpations: Abdomen is soft.      Tenderness: There is no abdominal tenderness.   Musculoskeletal:         General: No swelling.      Cervical back: Neck supple.   Skin:     General: Skin is warm and dry.      Capillary Refill: Capillary refill takes less than 2 seconds.   Neurological:      Mental Status: She is alert.      Comments: Patient is awake alert and  oriented to person place and time.  NIH stroke scale score of 0.  Cranial nerves II through XII grossly intact.  Symmetric 5 out of 5 strength of bilateral upper and lower extremities.   Psychiatric:         Mood and Affect: Mood normal.           ED Course & MDM   ED Course as of 09/26/24 2225   Thu Sep 26, 2024   1949 ECG 12 lead  ECG performed on September 26, 2024 at 1917 and interpreted by me at 1920 showing normal sinus rhythm, no axis deviation, right bundle branch block with a QRS duration of 122, nonspecific ST-T wave abnormality.  Very poor baseline.  No STEMI.  When compared with previous from August 23, 2024, no significant changes [EG]      ED Course User Index  [EG] Loida Alvarenga MD                 No data recorded     [place] Coma Scale Score: 15 (09/26/24 1927 : Solange Worthington RN)                           Medical Decision Making  Parts of this chart have been completed using voice recognition software. Please excuse any errors of transcription.  My thought process and reason for plan has been formulated from the time that I saw the patient until the time of disposition and is not specific to one specific moment during their visit and furthermore my MDM encompasses this entire chart and not only this text box.      HPI: Detailed above.    Exam: A medically appropriate exam performed, outlined above, given the known history and presentation.    History obtained from: Patient, family    EKG: Interpreted by attending physician and reviewed by me    Social Determinants of Health considered during this visit: Resides at assisted living facility    Medications given during visit:  Medications   sodium chloride 0.9 % bolus 500 mL (0 mL intravenous Stopped 9/26/24 2054)   acetaminophen (Tylenol) tablet 975 mg (975 mg oral Given 9/26/24 1931)        Diagnostic/tests  Labs Reviewed   CBC WITH AUTO DIFFERENTIAL - Abnormal       Result Value    WBC 7.3      nRBC 0.0      RBC 3.22 (*)     Hemoglobin  10.1 (*)     Hematocrit 31.8 (*)     MCV 99      MCH 31.4      MCHC 31.8 (*)     RDW 13.2      Platelets 141 (*)     Neutrophils % 70.3      Immature Granulocytes %, Automated 0.4      Lymphocytes % 14.5      Monocytes % 14.1      Eosinophils % 0.4      Basophils % 0.3      Neutrophils Absolute 5.12      Immature Granulocytes Absolute, Automated 0.03      Lymphocytes Absolute 1.06      Monocytes Absolute 1.03 (*)     Eosinophils Absolute 0.03      Basophils Absolute 0.02     COMPREHENSIVE METABOLIC PANEL - Abnormal    Glucose 126 (*)     Sodium 139      Potassium 3.4 (*)     Chloride 104      Bicarbonate 27      Anion Gap 11      Urea Nitrogen 18      Creatinine 0.56      eGFR 87      Calcium 8.4 (*)     Albumin 3.5      Alkaline Phosphatase 112      Total Protein 5.8 (*)     AST 13      Bilirubin, Total 0.9      ALT 12     URINALYSIS WITH REFLEX CULTURE AND MICROSCOPIC - Abnormal    Color, Urine Light-Yellow      Appearance, Urine Clear      Specific Gravity, Urine 1.020      pH, Urine 7.0      Protein, Urine 70 (1+) (*)     Glucose, Urine Normal      Blood, Urine 0.06 (1+) (*)     Ketones, Urine NEGATIVE      Bilirubin, Urine NEGATIVE      Urobilinogen, Urine 2 (1+) (*)     Nitrite, Urine NEGATIVE      Leukocyte Esterase, Urine NEGATIVE     MAGNESIUM - Normal    Magnesium 2.06     AMMONIA - Normal    Ammonia 23     SARS-COV-2 PCR - Normal    Coronavirus 2019, PCR Not Detected      Narrative:     This assay has received FDA Emergency Use Authorization (EUA) and is only authorized for the duration of time that circumstances exist to justify the authorization of the emergency use of in vitro diagnostic tests for the detection of SARS-CoV-2 virus and/or diagnosis of COVID-19 infection under section 564(b)(1) of the Act, 21 U.S.C. 360bbb-3(b)(1). This assay is an in vitro diagnostic nucleic acid amplification test for the qualitative detection of SARS-CoV-2 from nasopharyngeal specimens and has been validated for use  at [facility]. Negative results do not preclude COVID-19 infections and should not be used as the sole basis for diagnosis, treatment, or other management decisions.     DRUG SCREEN,URINE - Normal    Amphetamine Screen, Urine Presumptive Negative      Barbiturate Screen, Urine Presumptive Negative      Benzodiazepines Screen, Urine Presumptive Negative      Cannabinoid Screen, Urine Presumptive Negative      Cocaine Metabolite Screen, Urine Presumptive Negative      Fentanyl Screen, Urine Presumptive Negative      Opiate Screen, Urine Presumptive Negative      Oxycodone Screen, Urine Presumptive Negative      PCP Screen, Urine Presumptive Negative      Methadone Screen, Urine Presumptive Negative      Narrative:     Drug screen results are presumptive and should not be used to assess   compliance with prescribed medication. Contact the performing [facility] laboratory   to add-on definitive confirmatory testing if clinically indicated.    Toxicology screening results are reported qualitatively. The concentration must   be greater than or equal to the cutoff to be reported as positive. The concentration   at which the screening test can detect an individual drug or metabolite varies.   The absence of expected drug(s) and/or drug metabolite(s) may indicate non-compliance,   inappropriate timing of specimen collection relative to drug administration, poor drug   absorption, diluted/adulterated urine, or limitations of testing. For medical purposes   only; not valid for forensic use.    Interpretive questions should be directed to the laboratory medical directors.   URINALYSIS MICROSCOPIC WITH REFLEX CULTURE - Normal    WBC, Urine 1-5      RBC, Urine 1-2     URINALYSIS WITH REFLEX CULTURE AND MICROSCOPIC    Narrative:     The following orders were created for panel order Urinalysis with Reflex Culture and Microscopic.  Procedure                               Abnormality         Status                      ---------                               -----------         ------                     Urinalysis with Reflex C...[[id number]]  Abnormal            Final result               Extra Urine Gray Tube[[id number]]                                                         Please view results for these tests on the individual orders.   EXTRA URINE GRAY TUBE      XR hip left with pelvis when performed 2 or 3 views   Final Result   Status post bilateral total hip arthroplasty. No evidence of surgical   hardware fracture or displacement. The overlying soft tissues are   unremarkable.        MACRO:   None        Signed by: Alin Mcdonald 9/26/2024 8:44 PM   Dictation workstation:   [id number]      CT head wo IV contrast   Final Result   No evidence of acute intracranial abnormality.        No acute cervical spine fracture or malalignment.             MACRO:   None        Signed by: Abdoulaye Uribe 9/26/2024 8:04 PM   Dictation workstation:   [id number]      CT cervical spine wo IV contrast   Final Result   No evidence of acute intracranial abnormality.        No acute cervical spine fracture or malalignment.             MACRO:   None        Signed by: Abdoulaye Uribe 9/26/2024 8:04 PM   Dictation workstation:   [id number]      XR chest 1 view   Final Result   1.  No evidence of acute cardiopulmonary process.                  MACRO:   None        Signed by: Alin Mcdonald 9/26/2024 7:47 PM   Dictation workstation:   [id number]           Considerations/further MDM:  Patient is a 89-year-old female presenting for evaluation of frequent falls, hallucinations    Patient is awake and alert providing history hemodynamically stable upon arrival to the ER.  She has no acute complaints upon arrival.  She has no evidence of airway compromise or respiratory distress.  NIH stroke scale score of 0.  She has no gross motor, neurologic or vascular deficits on exam.  Abdominal exam is benign. Laboratory workup with chronic anemia but otherwise without  electrolyte disturbance, DENNISE, elevation in liver enzymes.  Urinalysis is without evidence of urinary tract infection.  Urine drug screen unremarkable.  Ammonia within normal limits.  CT head and C-spine are without acute processes.  Patient does continue to have visual hallucinations during the ER visit.  X-ray of the hip and pelvis is without evidence of acute fracture or dislocation.  At this time no metabolic etiology for hallucinations is identified.  Patient is medically cleared for psychiatric evaluation as patient's hallucinations thought to be related to possible anxiety or depression.  Pending EPAT evaluation.    It has reached the end of my shift and results are still pending.  From this point onward patient care will be given by Dr. Phillips.  Please refer to her note for additional details regarding the remainder of the patient visit and disposition.        Procedure  Procedures     Diana Law PA-C  09/26/24 5347

## 2024-09-26 NOTE — TELEPHONE ENCOUNTER
"Telephone call from Riddhi PRINCE and daughter Monica with concerns of increased confusion, altered mental status, and pt having hallucinations. Monica stated her mom was talking to people in the apartment \"as if they were right there in front of her\" the other day.  Pt was sent to ER on Monday for a fall. No significant findings. Labs reviewed again under \"media tab\" that were obtained 9/10 at facility and all were WNL. Riddhi PRINCE states pt had another fall on Wed 9/24 in apartment bathroom, and is unclear if pt hit head or not, pt did not go to ER. Riddhi stated neuro checks have been wnl for patient, and pt's urine was dipped and negative for signs of UTI. Riddhi PRINCE states pt denied headache, dizziness, changes in vision, nausea/vomiting, chest pain and sob. Pt with complaints of polyuria. Riddhi LPN states pt is laying in bed \"feeling miserable, and very confused\". Recommended to send to ER for further evaluation and treatment. Pt is a DNR-CCA.   "

## 2024-09-27 VITALS
OXYGEN SATURATION: 95 % | HEART RATE: 89 BPM | WEIGHT: 89.95 LBS | TEMPERATURE: 98.2 F | SYSTOLIC BLOOD PRESSURE: 125 MMHG | BODY MASS INDEX: 16.55 KG/M2 | HEIGHT: 62 IN | RESPIRATION RATE: 16 BRPM | DIASTOLIC BLOOD PRESSURE: 57 MMHG

## 2024-09-27 PROCEDURE — 2500000001 HC RX 250 WO HCPCS SELF ADMINISTERED DRUGS (ALT 637 FOR MEDICARE OP): Performed by: EMERGENCY MEDICINE

## 2024-09-27 PROCEDURE — 2500000002 HC RX 250 W HCPCS SELF ADMINISTERED DRUGS (ALT 637 FOR MEDICARE OP, ALT 636 FOR OP/ED): Performed by: EMERGENCY MEDICINE

## 2024-09-27 RX ADMIN — DONEPEZIL HYDROCHLORIDE 10 MG: 10 TABLET, FILM COATED ORAL at 00:01

## 2024-09-27 RX ADMIN — LEVOTHYROXINE SODIUM 25 MCG: 0.03 TABLET ORAL at 08:35

## 2024-09-27 RX ADMIN — GABAPENTIN 200 MG: 100 CAPSULE ORAL at 00:00

## 2024-09-27 RX ADMIN — AMLODIPINE BESYLATE 10 MG: 5 TABLET ORAL at 08:33

## 2024-09-27 RX ADMIN — ESCITALOPRAM OXALATE 5 MG: 10 TABLET ORAL at 08:34

## 2024-09-27 RX ADMIN — ROSUVASTATIN CALCIUM 40 MG: 20 TABLET, COATED ORAL at 00:01

## 2024-09-27 RX ADMIN — PANTOPRAZOLE SODIUM 40 MG: 40 TABLET, DELAYED RELEASE ORAL at 08:33

## 2024-09-27 NOTE — ED NOTES
Pt was declined by 2N due to acuity . [facility]. SW will continue to follow-up regarding disposition .

## 2024-09-27 NOTE — ED NOTES
Pt was accepted to [facility] by Dr. Cho. N-2-N [phone]. SW provided clinical information to RN for report. BH signing off.
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
## 2024-09-29 LAB
ATRIAL RATE: 340 BPM
P AXIS: 73 DEGREES
P OFFSET: 176 MS
P ONSET: 132 MS
Q ONSET: 212 MS
QRS COUNT: 11 BEATS
QRS DURATION: 122 MS
QT INTERVAL: 440 MS
QTC CALCULATION(BAZETT): 478 MS
QTC FREDERICIA: 465 MS
R AXIS: 29 DEGREES
T AXIS: 57 DEGREES
T OFFSET: 432 MS
VENTRICULAR RATE: 71 BPM

## 2024-10-09 DIAGNOSIS — G62.9 NEUROPATHY: ICD-10-CM

## 2024-10-09 RX ORDER — GABAPENTIN 100 MG/1
100 CAPSULE ORAL 3 TIMES DAILY
Qty: 90 CAPSULE | Refills: 5 | Status: SHIPPED | OUTPATIENT
Start: 2024-10-09 | End: 2025-04-07

## 2024-10-14 ENCOUNTER — TELEPHONE (OUTPATIENT)
Dept: UROLOGY | Facility: CLINIC | Age: 89
End: 2024-10-14

## 2024-10-14 NOTE — TELEPHONE ENCOUNTER
Left message for patient. 10/18 appointment needs to be rescheduled to earlier time before 3pm or different date

## 2024-10-15 ENCOUNTER — HOSPITAL ENCOUNTER (OUTPATIENT)
Dept: CARDIOLOGY | Facility: HOSPITAL | Age: 89
Discharge: HOME | End: 2024-10-15
Payer: MEDICARE

## 2024-10-15 ENCOUNTER — APPOINTMENT (OUTPATIENT)
Dept: RADIOLOGY | Facility: HOSPITAL | Age: 89
End: 2024-10-15
Payer: MEDICARE

## 2024-10-15 ENCOUNTER — APPOINTMENT (OUTPATIENT)
Dept: CARDIOLOGY | Facility: HOSPITAL | Age: 89
End: 2024-10-15
Payer: MEDICARE

## 2024-10-15 ENCOUNTER — HOSPITAL ENCOUNTER (EMERGENCY)
Facility: HOSPITAL | Age: 89
Discharge: HOME | End: 2024-10-15
Payer: MEDICARE

## 2024-10-15 VITALS
OXYGEN SATURATION: 98 % | SYSTOLIC BLOOD PRESSURE: 128 MMHG | RESPIRATION RATE: 16 BRPM | HEART RATE: 64 BPM | DIASTOLIC BLOOD PRESSURE: 58 MMHG | TEMPERATURE: 97.8 F

## 2024-10-15 DIAGNOSIS — M25.551 BILATERAL HIP PAIN: ICD-10-CM
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
## 2024-10-15 DIAGNOSIS — M25.552 BILATERAL HIP PAIN: ICD-10-CM

## 2024-10-15 LAB
ALBUMIN SERPL BCP-MCNC: 3.4 G/DL (ref 3.4–5)
ALP SERPL-CCNC: 199 U/L (ref 33–136)
ALT SERPL W P-5'-P-CCNC: 22 U/L (ref 7–45)
ANION GAP SERPL CALCULATED.3IONS-SCNC: 11 MMOL/L (ref 10–20)
AST SERPL W P-5'-P-CCNC: 22 U/L (ref 9–39)
ATRIAL RATE: 58 BPM
BASOPHILS # BLD AUTO: 0.02 X10*3/UL (ref 0–0.1)
BASOPHILS NFR BLD AUTO: 0.2 %
BILIRUB SERPL-MCNC: 1 MG/DL (ref 0–1.2)
BUN SERPL-MCNC: 14 MG/DL (ref 6–23)
CALCIUM SERPL-MCNC: 9.1 MG/DL (ref 8.6–10.3)
CHLORIDE SERPL-SCNC: 105 MMOL/L (ref 98–107)
CO2 SERPL-SCNC: 31 MMOL/L (ref 21–32)
CREAT SERPL-MCNC: 0.71 MG/DL (ref 0.5–1.05)
EGFRCR SERPLBLD CKD-EPI 2021: 81 ML/MIN/1.73M*2
EOSINOPHIL # BLD AUTO: 0.02 X10*3/UL (ref 0–0.4)
EOSINOPHIL NFR BLD AUTO: 0.2 %
ERYTHROCYTE [DISTWIDTH] IN BLOOD BY AUTOMATED COUNT: 13.1 % (ref 11.5–14.5)
GLUCOSE BLD MANUAL STRIP-MCNC: 92 MG/DL (ref 74–99)
GLUCOSE SERPL-MCNC: 92 MG/DL (ref 74–99)
HCT VFR BLD AUTO: 36.6 % (ref 36–46)
HGB BLD-MCNC: 11.7 G/DL (ref 12–16)
IMM GRANULOCYTES # BLD AUTO: 0.05 X10*3/UL (ref 0–0.5)
IMM GRANULOCYTES NFR BLD AUTO: 0.4 % (ref 0–0.9)
LYMPHOCYTES # BLD AUTO: 1.84 X10*3/UL (ref 0.8–3)
LYMPHOCYTES NFR BLD AUTO: 15.9 %
MAGNESIUM SERPL-MCNC: 2.18 MG/DL (ref 1.6–2.4)
MCH RBC QN AUTO: 30.8 PG (ref 26–34)
MCHC RBC AUTO-ENTMCNC: 32 G/DL (ref 32–36)
MCV RBC AUTO: 96 FL (ref 80–100)
MONOCYTES # BLD AUTO: 0.91 X10*3/UL (ref 0.05–0.8)
MONOCYTES NFR BLD AUTO: 7.9 %
NEUTROPHILS # BLD AUTO: 8.7 X10*3/UL (ref 1.6–5.5)
NEUTROPHILS NFR BLD AUTO: 75.4 %
NRBC BLD-RTO: 0 /100 WBCS (ref 0–0)
OVALOCYTES BLD QL SMEAR: NORMAL
P AXIS: 97 DEGREES
P OFFSET: 179 MS
P ONSET: 135 MS
PLATELET # BLD AUTO: 216 X10*3/UL (ref 150–450)
POTASSIUM SERPL-SCNC: 3.5 MMOL/L (ref 3.5–5.3)
PR INTERVAL: 172 MS
PROT SERPL-MCNC: 6.2 G/DL (ref 6.4–8.2)
Q ONSET: 221 MS
QRS COUNT: 9 BEATS
QRS DURATION: 78 MS
QT INTERVAL: 458 MS
QTC CALCULATION(BAZETT): 449 MS
QTC FREDERICIA: 452 MS
R AXIS: 47 DEGREES
RBC # BLD AUTO: 3.8 X10*6/UL (ref 4–5.2)
RBC MORPH BLD: NORMAL
SODIUM SERPL-SCNC: 143 MMOL/L (ref 136–145)
T AXIS: 73 DEGREES
T OFFSET: 450 MS
VENTRICULAR RATE: 58 BPM
WBC # BLD AUTO: 11.5 X10*3/UL (ref 4.4–11.3)

## 2024-10-15 PROCEDURE — 93005 ELECTROCARDIOGRAM TRACING: CPT

## 2024-10-15 PROCEDURE — 80053 COMPREHEN METABOLIC PANEL: CPT

## 2024-10-15 PROCEDURE — 70450 CT HEAD/BRAIN W/O DYE: CPT

## 2024-10-15 PROCEDURE — 72170 X-RAY EXAM OF PELVIS: CPT | Performed by: STUDENT IN AN ORGANIZED HEALTH CARE EDUCATION/TRAINING PROGRAM

## 2024-10-15 PROCEDURE — 94760 N-INVAS EAR/PLS OXIMETRY 1: CPT

## 2024-10-15 PROCEDURE — 36415 COLL VENOUS BLD VENIPUNCTURE: CPT

## 2024-10-15 PROCEDURE — 93010 ELECTROCARDIOGRAM REPORT: CPT | Performed by: INTERNAL MEDICINE

## 2024-10-15 PROCEDURE — 72125 CT NECK SPINE W/O DYE: CPT | Performed by: STUDENT IN AN ORGANIZED HEALTH CARE EDUCATION/TRAINING PROGRAM

## 2024-10-15 PROCEDURE — 85025 COMPLETE CBC W/AUTO DIFF WBC: CPT

## 2024-10-15 PROCEDURE — 82947 ASSAY GLUCOSE BLOOD QUANT: CPT

## 2024-10-15 PROCEDURE — 72125 CT NECK SPINE W/O DYE: CPT

## 2024-10-15 PROCEDURE — 70450 CT HEAD/BRAIN W/O DYE: CPT | Performed by: STUDENT IN AN ORGANIZED HEALTH CARE EDUCATION/TRAINING PROGRAM

## 2024-10-15 PROCEDURE — 83735 ASSAY OF MAGNESIUM: CPT

## 2024-10-15 PROCEDURE — 73030 X-RAY EXAM OF SHOULDER: CPT | Mod: LT

## 2024-10-15 PROCEDURE — 99285 EMERGENCY DEPT VISIT HI MDM: CPT | Mod: 25

## 2024-10-15 PROCEDURE — 72170 X-RAY EXAM OF PELVIS: CPT

## 2024-10-15 PROCEDURE — 73030 X-RAY EXAM OF SHOULDER: CPT | Mod: LEFT SIDE | Performed by: STUDENT IN AN ORGANIZED HEALTH CARE EDUCATION/TRAINING PROGRAM

## 2024-10-15 ASSESSMENT — COLUMBIA-SUICIDE SEVERITY RATING SCALE - C-SSRS
2. HAVE YOU ACTUALLY HAD ANY THOUGHTS OF KILLING YOURSELF?: NO
6. HAVE YOU EVER DONE ANYTHING, STARTED TO DO ANYTHING, OR PREPARED TO DO ANYTHING TO END YOUR LIFE?: NO
1. IN THE PAST MONTH, HAVE YOU WISHED YOU WERE DEAD OR WISHED YOU COULD GO TO SLEEP AND NOT WAKE UP?: NO

## 2024-10-15 ASSESSMENT — LIFESTYLE VARIABLES
HAVE PEOPLE ANNOYED YOU BY CRITICIZING YOUR DRINKING: NO
HAVE YOU EVER FELT YOU SHOULD CUT DOWN ON YOUR DRINKING: NO
EVER HAD A DRINK FIRST THING IN THE MORNING TO STEADY YOUR NERVES TO GET RID OF A HANGOVER: NO
TOTAL SCORE: 0
EVER FELT BAD OR GUILTY ABOUT YOUR DRINKING: NO

## 2024-10-15 ASSESSMENT — PAIN DESCRIPTION - PAIN TYPE: TYPE: ACUTE PAIN

## 2024-10-15 ASSESSMENT — PAIN DESCRIPTION - LOCATION: LOCATION: PELVIS

## 2024-10-15 ASSESSMENT — PAIN SCALES - GENERAL: PAINLEVEL_OUTOF10: 3

## 2024-10-15 ASSESSMENT — PAIN - FUNCTIONAL ASSESSMENT: PAIN_FUNCTIONAL_ASSESSMENT: 0-10

## 2024-10-15 NOTE — DISCHARGE INSTRUCTIONS
You were seen today for an accidental fall, your lab work and imaging are reassuring, please follow-up with your primary care provider, please return the ER for worsening symptoms.

## 2024-10-15 NOTE — ED PROVIDER NOTES
HPI   No chief complaint on file.      Patient is a 89-year-old female who presents today with a chief complaint of an accidental fall.  Patient was at her assisted living facility, patient had a ground-level mechanical fall, she was unable to get off the floor, was complained of hip pain, left shoulder pain, neck pain.  EMS placed her in a pelvic binder, and cervical collar.  Patient is complaining of the left shoulder pain and worsening left hip pain, patient had no loss of consciousness, patient does not take a blood thinner, initially just activated as a trauma, on upon arrival I downgraded this as does not meet any trauma criteria.      History provided by:  EMS personnel and patient          Patient History   Past Medical History:   Diagnosis Date    Age-related nuclear cataract of left eye 09/03/2023    Anxiety 09/03/2023    Benign paroxysmal positional vertigo 09/03/2023    Coronary artery disease 09/03/2023    Gastroesophageal reflux disease 09/03/2023    Hip dislocation, right, initial encounter (Multi) 08/23/2024    Hyperlipidemia 09/03/2023    Osteoarthritis 09/03/2023    Unspecified cataract     Cataract of right eye, unspecified cataract type    Urinary incontinence 09/03/2023    Venous thrombosis 09/03/2023    Vitamin D deficiency 09/03/2023     Past Surgical History:   Procedure Laterality Date    CATARACT EXTRACTION  09/04/2018    Cataract Surgery    CT GUIDED IMAGING FOR NEEDLE PLACEMENT  9/27/2016    CT GUIDED IMAGING FOR NEEDLE PLACEMENT [facility]     Family History   Problem Relation Name Age of Onset    Hypertension Mother      Stroke Mother      Arthritis Father      Parkinsonism Sister      Dementia Sister      Cancer Maternal Grandfather      Heart disease Other ukn     Hypertension Other ukn     Stroke Other uk     Hypertension Sibling      Stroke Sibling       Social History     Tobacco Use    Smoking status: Never    Smokeless tobacco: Never   Substance Use Topics    Alcohol  use: Never    Drug use: Never       Physical Exam   ED Triage Vitals   Temp Pulse Resp BP   -- -- -- --      SpO2 Temp src Heart Rate Source Patient Position   -- -- -- --      BP Location FiO2 (%)     -- --       Physical Exam  Vitals and nursing note reviewed. Exam conducted with a chaperone present.   Constitutional:       General: She is not in acute distress.     Appearance: Normal appearance. She is normal weight. She is not ill-appearing, toxic-appearing or diaphoretic.   HENT:      Head: Normocephalic and atraumatic.      Nose: Nose normal.      Mouth/Throat:      Mouth: Mucous membranes are moist.      Pharynx: Oropharynx is clear.   Eyes:      Extraocular Movements: Extraocular movements intact.      Conjunctiva/sclera: Conjunctivae normal.      Pupils: Pupils are equal, round, and reactive to light.   Cardiovascular:      Rate and Rhythm: Regular rhythm. Bradycardia present.      Pulses: Normal pulses.      Heart sounds: Normal heart sounds.   Pulmonary:      Effort: Pulmonary effort is normal.      Breath sounds: Normal breath sounds.   Abdominal:      General: Abdomen is flat. Bowel sounds are normal.      Palpations: Abdomen is soft.   Musculoskeletal:         General: Tenderness present.      Cervical back: Normal range of motion and neck supple.   Skin:     General: Skin is warm and dry.      Capillary Refill: Capillary refill takes less than 2 seconds.   Neurological:      General: No focal deficit present.      Mental Status: She is alert. Mental status is at baseline.   Psychiatric:         Mood and Affect: Mood normal.         Behavior: Behavior normal.         Thought Content: Thought content normal.           ED Course & MDM   ED Course as of 10/15/24 0429   Tue Oct 15, 2024   0321 EKG interpreted by myself independently, EKG shows sinus bradycardia, rate of 58 bpm, , QRS 78, , QTc 449, patient has no ST elevations or depressions, negative for acute MI. [ALBA]      ED Course User  Index  [ALBA] Terrance Busby, DO         Diagnoses as of 10/15/24 0429   Accidental fall, initial encounter   Bilateral hip pain                 No data recorded                                 Medical Decision Making  Patient seen and evaluated at bedside, patient is in no acute distress.  I will order a CT head, CT cervical spine, x-ray shoulder, x-ray pelvis, CBC, CMP, urinalysis, EKG. Differential diagnosis includes but is not limited to accidental fall, hip fracture,.  Patient's lab work and imaging are reassuring, no acute traumatic injuries, patiently discharged back to her nursing facility, this was discussed with her family ember and the patient, they are both agreeable this discharge plan.    Diagnosis: Accidental fall, hip pain    CT head wo IV contrast   Final Result    CT HEAD:    1. No evidence of hemorrhage, skull fracture, or other acute    intracranial trauma.    2. Patchy attenuation changes present in the periventricular and    subcortical white matter of bilateral cerebral hemispheres are    favored to represent sequela of microvascular disease.          CT C-SPINE:    1. No evidence of acute trauma to the cervical spine.    2. Multilevel degenerative changes of the cervical spine with    intervertebral disc height loss, facet osteoarthropathy and possibly    mild spinal canal narrowing due to disc osteophyte complex and    hypertrophic facet changes are similar in appearance to recent exam    on 09/26/2024.          MACRO:    None          Signed by: Brigette Beck 10/15/2024 4:06 AM    Dictation workstation:   [id number]     CT cervical spine wo IV contrast   Final Result    CT HEAD:    1. No evidence of hemorrhage, skull fracture, or other acute    intracranial trauma.    2. Patchy attenuation changes present in the periventricular and    subcortical white matter of bilateral cerebral hemispheres are    favored to represent sequela of microvascular disease.          CT C-SPINE:    1. No  evidence of acute trauma to the cervical spine.    2. Multilevel degenerative changes of the cervical spine with    intervertebral disc height loss, facet osteoarthropathy and possibly    mild spinal canal narrowing due to disc osteophyte complex and    hypertrophic facet changes are similar in appearance to recent exam    on 09/26/2024.          MACRO:    None          Signed by: Brigette Beck 10/15/2024 4:06 AM    Dictation workstation:   [id number]     XR pelvis 1-2 views   Final Result    Status post bilateral hip replacements without evidence of new    fracture or traumatic malalignment.                MACRO:    None          Signed by: Brigette Beck 10/15/2024 3:50 AM    Dictation workstation:   [id number]     XR shoulder left 2+ views   Final Result    No evidence of fracture or traumatic malalignment of the left    shoulder.                MACRO:    None          Signed by: Brigette Beck 10/15/2024 3:51 AM    Dictation workstation:   [id number]     Results for orders placed or performed during the hospital encounter of 10/15/24  -CBC and Auto Differential:        Result                      Value             Ref Range           WBC                         11.5 (H)          4.4 - 11.3 x*       nRBC                        0.0               0.0 - 0.0 /1*       RBC                         3.80 (L)          4.00 - 5.20 *       Hemoglobin                  11.7 (L)          12.0 - 16.0 *       Hematocrit                  36.6              36.0 - 46.0 %       MCV                         96                80 - 100 fL         MCH                         30.8              26.0 - 34.0 *       MCHC                        32.0              32.0 - 36.0 *       RDW                         13.1              11.5 - 14.5 %       Platelets                   216               150 - 450 x1*       Neutrophils %               75.4              40.0 - 80.0 %       Immature Granulocytes *     0.4                0.0 - 0.9 %         Lymphocytes %               15.9              13.0 - 44.0 %       Monocytes %                 7.9               2.0 - 10.0 %        Eosinophils %               0.2               0.0 - 6.0 %         Basophils %                 0.2               0.0 - 2.0 %         Neutrophils Absolute        8.70 (H)          1.60 - 5.50 *       Immature Granulocytes *     0.05              0.00 - 0.50 *       Lymphocytes Absolute        1.84              0.80 - 3.00 *       Monocytes Absolute          0.91 (H)          0.05 - 0.80 *       Eosinophils Absolute        0.02              0.00 - 0.40 *       Basophils Absolute          0.02              0.00 - 0.10 *  -Magnesium:        Result                      Value             Ref Range           Magnesium                   2.18              1.60 - 2.40 *  -Comprehensive metabolic panel:        Result                      Value             Ref Range           Glucose                     92                74 - 99 mg/dL       Sodium                      143               136 - 145 mm*       Potassium                   3.5               3.5 - 5.3 mm*       Chloride                    105               98 - 107 mmo*       Bicarbonate                 31                21 - 32 mmol*       Anion Gap                   11                10 - 20 mmol*       Urea Nitrogen               14                6 - 23 mg/dL        Creatinine                  0.71              0.50 - 1.05 *       eGFR                        81                >60 mL/min/1*       Calcium                     9.1               8.6 - 10.3 m*       Albumin                     3.4               3.4 - 5.0 g/*       Alkaline Phosphatase        199 (H)           33 - 136 U/L        Total Protein               6.2 (L)           6.4 - 8.2 g/*       AST                         22                9 - 39 U/L          Bilirubin, Total            1.0               0.0 - 1.2 mg*       ALT                         22                 7 - 45 U/L     -POCT GLUCOSE:        Result                      Value             Ref Range           POCT Glucose                92                74 - 99 mg/dL  -Morphology:        Result                      Value             Ref Range           RBC Morphology              See Below                             Ovalocytes                  Few                                      Procedure  Procedures    Sections of this report were created using voice-to-text technology and may contain errors in translation    Terrance Busby DO  Emergency Medicine         Terrance Busby DO  10/15/24 7817

## 2024-10-15 NOTE — ED TRIAGE NOTES
Pt got up to use the restroom and fell. No LOC reported. Pt reported pain to neck and pelvis. Pt on 81mg ASA, EMS called trauma alert. Pt received into T2 A/Ox2, Pt is resident to memory care unit. W/o CP SOB or N/V.

## 2024-10-15 NOTE — CARE PLAN
Limited trauma called; patient arrived 0309 awake and talking; 0312 SpO2 99% hr 61 resp rate 18, on Room Air.

## 2024-10-18 ENCOUNTER — APPOINTMENT (OUTPATIENT)
Dept: UROLOGY | Facility: CLINIC | Age: 89
End: 2024-10-18
Payer: MEDICARE

## 2024-10-22 ENCOUNTER — TELEPHONE (OUTPATIENT)
Dept: POST ACUTE CARE | Facility: EXTERNAL LOCATION | Age: 89
End: 2024-10-22
Payer: MEDICARE

## 2024-10-22 NOTE — TELEPHONE ENCOUNTER
Jazmin PRINCE from assisted living calls to review antibiotic sensitivity report for UTI. Allergies and recent labs reviewed. Will start pt on ampicillin 500mg po q 6 hrs x 7 days. Spoke to Aydin at [facility] pharmacy, and rx ordered.

## 2024-10-25 ENCOUNTER — NURSING HOME VISIT (OUTPATIENT)
Dept: POST ACUTE CARE | Facility: EXTERNAL LOCATION | Age: 89
End: 2024-10-25
Payer: MEDICARE

## 2024-10-25 VITALS
BODY MASS INDEX: 16.25 KG/M2 | HEIGHT: 62 IN | HEART RATE: 67 BPM | SYSTOLIC BLOOD PRESSURE: 134 MMHG | OXYGEN SATURATION: 96 % | DIASTOLIC BLOOD PRESSURE: 76 MMHG | RESPIRATION RATE: 16 BRPM | WEIGHT: 88.3 LBS

## 2024-10-25 DIAGNOSIS — R29.6 FALL IN ELDERLY PATIENT: ICD-10-CM

## 2024-10-25 DIAGNOSIS — N30.00 ACUTE CYSTITIS WITHOUT HEMATURIA: Primary | ICD-10-CM

## 2024-10-25 PROCEDURE — 99348 HOME/RES VST EST LOW MDM 30: CPT

## 2024-10-25 RX ORDER — AMPICILLIN 500 MG/1
500 CAPSULE ORAL EVERY 6 HOURS SCHEDULED
Start: 2024-10-25 | End: 2024-11-01

## 2024-10-25 ASSESSMENT — ENCOUNTER SYMPTOMS
ACTIVITY CHANGE: 1
CONSTIPATION: 0
RHINORRHEA: 0
VOMITING: 0
FEVER: 0
FREQUENCY: 0
COUGH: 0
DYSURIA: 0
SLEEP DISTURBANCE: 0
APPETITE CHANGE: 0
PALPITATIONS: 0
CHILLS: 0
ARTHRALGIAS: 1
DIARRHEA: 0
SHORTNESS OF BREATH: 0
NERVOUS/ANXIOUS: 0
DIZZINESS: 0
WEAKNESS: 1
NAUSEA: 0
HEADACHES: 0
FATIGUE: 0

## 2024-10-25 ASSESSMENT — PAIN SCALES - GENERAL: PAINLEVEL_OUTOF10: 0-NO PAIN

## 2024-10-25 NOTE — LETTER
Patient: Mariaelena Tavarez  : 1935    Encounter Date: 10/25/2024    Subjective  Patient ID: Mariaelena Tavarez is a 89 y.o. female who is assisted living/ home patient being seen at [facility], and evaluated for UTI.     UTI   Pertinent negatives include no chills, frequency, nausea or vomiting.      HPI:  Pt visited in apartment for complaints of UTI. Pt up in recliner resting, oriented x3, comfortable and denies pain. Pt admits to frequent urination. Denies fever, chills, headaches, dizziness, burning or retention. Pt recently was sent to ER for a fall. No significant findings and pt returned to AL.  Pt with purple bruising to left eye. Pt states she slipped and hit the closet door. Pt with baseline ROM to extremities. Pt admits appetite is good, denies nausea/vomiting. Pt with no concerns at this time.    Current Outpatient Medications on File Prior to Visit   Medication Sig Dispense Refill   • acetaminophen (Tylenol) 325 mg tablet Take 2 tablets (650 mg) by mouth every 4 hours if needed for mild pain (1 - 3), headaches or fever (temp greater than 38.0 C).     • alum-mag hydroxide-simeth (Mylanta) 200-200-20 mg/5 mL oral suspension Take 30 mL by mouth every 4 hours if needed for indigestion or heartburn.     • amLODIPine (Norvasc) 10 mg tablet Take 1 tablet (10 mg) by mouth once daily. (Patient taking differently: Take 1 tablet (10 mg) by mouth once daily. Hold for SBP less than 120, and HR less than 60.) 90 tablet 3   • aspirin 81 mg chewable tablet Chew 1 tablet (81 mg) once daily.     • atenolol (Tenormin) 25 mg tablet Take 1 tablet (25 mg) by mouth 2 times a day. 25 mg 2x a day (Patient taking differently: Take 1 tablet (25 mg) by mouth 2 times a day. Hold for SBP less than 120, and HR less than 60.) 180 tablet 3   • cholecalciferol (Vitamin D-3) 50 MCG (2000 UT) tablet Take 1 tablet (2,000 Units) by mouth once daily.     • cranberry fruit (cranberry) 450 mg tablet Take 1 tablet by mouth 2 times a day.  "90 tablet 3   • d-mannose 500 mg capsule Take 1 capsule by mouth once daily. 30 capsule 3   • donepezil (Aricept) 10 mg tablet TAKE ONE TABLET BY MOUTH DAILY AT BEDTIME AS DIRECTED 90 tablet 1   • escitalopram (Lexapro) 5 mg tablet 1 tablet a day 90 tablet 3   • gabapentin (Neurontin) 100 mg capsule Take 1 capsule (100 mg) by mouth 3 times a day. 90 capsule 5   • levothyroxine (Synthroid, Levoxyl) 25 mcg tablet Take 1 tablet (25 mcg) by mouth once daily in the morning. Take before meals. . for 90 90 tablet 3   • magnesium hydroxide (Milk of Magnesia) 400 mg/5 mL suspension Take 30 mL by mouth once daily as needed for constipation (If no BM in 3 days.).     • pantoprazole (ProtoNix) 40 mg EC tablet Take 1 tablet (40 mg) by mouth once daily. 90 tablet 3   • rosuvastatin (Crestor) 40 mg tablet Take 1 tablet (40 mg) by mouth once daily. 90 tablet 3   • traMADol (Ultram) 50 mg tablet Take 1 tablet (50 mg) by mouth every 6 hours if needed for severe pain (7 - 10) for up to 10 doses. 10 tablet 0     No current facility-administered medications on file prior to visit.        Review of Systems   Constitutional:  Positive for activity change. Negative for appetite change, chills, fatigue and fever.   HENT:  Negative for congestion, hearing loss and rhinorrhea.    Eyes:  Positive for visual disturbance.        Glasses   Respiratory:  Negative for cough and shortness of breath.    Cardiovascular:  Negative for chest pain, palpitations and leg swelling.   Gastrointestinal:  Negative for constipation, diarrhea, nausea and vomiting.   Genitourinary:  Negative for dysuria and frequency.   Musculoskeletal:  Positive for arthralgias and gait problem.        Utilizing rollator for mobility   Neurological:  Positive for weakness. Negative for dizziness and headaches.   Psychiatric/Behavioral:  Negative for sleep disturbance. The patient is not nervous/anxious.        Objective  /76   Pulse 67   Resp 16   Ht 1.575 m (5' 2\")   " Wt (!) 40.1 kg (88 lb 4.8 oz)   SpO2 96%   BMI 16.15 kg/m²     Physical Exam  Constitutional:       General: She is awake. She is not in acute distress.     Appearance: Normal appearance. She is not ill-appearing.   HENT:      Head: Normocephalic.      Nose: No congestion or rhinorrhea.      Mouth/Throat:      Mouth: Mucous membranes are moist.   Eyes:      Conjunctiva/sclera: Conjunctivae normal.      Pupils: Pupils are equal, round, and reactive to light.   Cardiovascular:      Rate and Rhythm: Normal rate and regular rhythm.      Pulses: Normal pulses.      Heart sounds: Normal heart sounds.   Pulmonary:      Effort: Pulmonary effort is normal. No respiratory distress.      Breath sounds: Normal breath sounds. No wheezing or rhonchi.   Abdominal:      General: Bowel sounds are normal.      Palpations: Abdomen is soft.   Musculoskeletal:         General: Normal range of motion.      Cervical back: Normal range of motion.      Right lower leg: No edema.      Left lower leg: No edema.   Skin:     General: Skin is warm.      Capillary Refill: Capillary refill takes less than 2 seconds.      Findings: Bruising present.          Neurological:      General: No focal deficit present.      Mental Status: She is alert and oriented to person, place, and time. Mental status is at baseline.   Psychiatric:         Attention and Perception: Attention normal.         Mood and Affect: Mood normal.         Speech: Speech normal.         Behavior: Behavior normal. Behavior is cooperative.         Thought Content: Thought content normal.         Cognition and Memory: Cognition and memory normal. Cognition is not impaired. Memory is not impaired.         Judgment: Judgment normal.         Assessment/Plan  Diagnoses and all orders for this visit:  Acute cystitis without hematuria  Comments:  Culture and sensitivity reviewed.  Orders:  -     ampicillin (Principen) 500 mg capsule; Take 1 capsule (500 mg) by mouth every 6 hours for 7  days.  Fall in elderly patient  Comments:  Continue to work with PT.            Electronically Signed By: SHANIKA Bhatia-CNP   10/25/24  5:06 PM

## 2024-10-25 NOTE — PROGRESS NOTES
Subjective   Patient ID: Mariaelena Tavarez is a 89 y.o. female who is assisted living/ home patient being seen at [facility], and evaluated for UTI.     UTI   Pertinent negatives include no chills, frequency, nausea or vomiting.      HPI:  Pt visited in apartment for complaints of UTI. Pt up in recliner resting, oriented x3, comfortable and denies pain. Pt admits to frequent urination. Denies fever, chills, headaches, dizziness, burning or retention. Pt recently was sent to ER for a fall. No significant findings and pt returned to AL.  Pt with purple bruising to left eye. Pt states she slipped and hit the closet door. Pt with baseline ROM to extremities. Pt admits appetite is good, denies nausea/vomiting. Pt with no concerns at this time.    Current Outpatient Medications on File Prior to Visit   Medication Sig Dispense Refill    acetaminophen (Tylenol) 325 mg tablet Take 2 tablets (650 mg) by mouth every 4 hours if needed for mild pain (1 - 3), headaches or fever (temp greater than 38.0 C).      alum-mag hydroxide-simeth (Mylanta) 200-200-20 mg/5 mL oral suspension Take 30 mL by mouth every 4 hours if needed for indigestion or heartburn.      amLODIPine (Norvasc) 10 mg tablet Take 1 tablet (10 mg) by mouth once daily. (Patient taking differently: Take 1 tablet (10 mg) by mouth once daily. Hold for SBP less than 120, and HR less than 60.) 90 tablet 3    aspirin 81 mg chewable tablet Chew 1 tablet (81 mg) once daily.      atenolol (Tenormin) 25 mg tablet Take 1 tablet (25 mg) by mouth 2 times a day. 25 mg 2x a day (Patient taking differently: Take 1 tablet (25 mg) by mouth 2 times a day. Hold for SBP less than 120, and HR less than 60.) 180 tablet 3    cholecalciferol (Vitamin D-3) 50 MCG (2000 UT) tablet Take 1 tablet (2,000 Units) by mouth once daily.      cranberry fruit (cranberry) 450 mg tablet Take 1 tablet by mouth 2 times a day. 90 tablet 3    d-mannose 500 mg capsule Take 1 capsule by mouth once daily.  "30 capsule 3    donepezil (Aricept) 10 mg tablet TAKE ONE TABLET BY MOUTH DAILY AT BEDTIME AS DIRECTED 90 tablet 1    escitalopram (Lexapro) 5 mg tablet 1 tablet a day 90 tablet 3    gabapentin (Neurontin) 100 mg capsule Take 1 capsule (100 mg) by mouth 3 times a day. 90 capsule 5    levothyroxine (Synthroid, Levoxyl) 25 mcg tablet Take 1 tablet (25 mcg) by mouth once daily in the morning. Take before meals. . for 90 90 tablet 3    magnesium hydroxide (Milk of Magnesia) 400 mg/5 mL suspension Take 30 mL by mouth once daily as needed for constipation (If no BM in 3 days.).      pantoprazole (ProtoNix) 40 mg EC tablet Take 1 tablet (40 mg) by mouth once daily. 90 tablet 3    rosuvastatin (Crestor) 40 mg tablet Take 1 tablet (40 mg) by mouth once daily. 90 tablet 3    traMADol (Ultram) 50 mg tablet Take 1 tablet (50 mg) by mouth every 6 hours if needed for severe pain (7 - 10) for up to 10 doses. 10 tablet 0     No current facility-administered medications on file prior to visit.        Review of Systems   Constitutional:  Positive for activity change. Negative for appetite change, chills, fatigue and fever.   HENT:  Negative for congestion, hearing loss and rhinorrhea.    Eyes:  Positive for visual disturbance.        Glasses   Respiratory:  Negative for cough and shortness of breath.    Cardiovascular:  Negative for chest pain, palpitations and leg swelling.   Gastrointestinal:  Negative for constipation, diarrhea, nausea and vomiting.   Genitourinary:  Negative for dysuria and frequency.   Musculoskeletal:  Positive for arthralgias and gait problem.        Utilizing rollator for mobility   Neurological:  Positive for weakness. Negative for dizziness and headaches.   Psychiatric/Behavioral:  Negative for sleep disturbance. The patient is not nervous/anxious.        Objective   /76   Pulse 67   Resp 16   Ht 1.575 m (5' 2\")   Wt (!) 40.1 kg (88 lb 4.8 oz)   SpO2 96%   BMI 16.15 kg/m²     Physical " Exam  Constitutional:       General: She is awake. She is not in acute distress.     Appearance: Normal appearance. She is not ill-appearing.   HENT:      Head: Normocephalic.      Nose: No congestion or rhinorrhea.      Mouth/Throat:      Mouth: Mucous membranes are moist.   Eyes:      Conjunctiva/sclera: Conjunctivae normal.      Pupils: Pupils are equal, round, and reactive to light.   Cardiovascular:      Rate and Rhythm: Normal rate and regular rhythm.      Pulses: Normal pulses.      Heart sounds: Normal heart sounds.   Pulmonary:      Effort: Pulmonary effort is normal. No respiratory distress.      Breath sounds: Normal breath sounds. No wheezing or rhonchi.   Abdominal:      General: Bowel sounds are normal.      Palpations: Abdomen is soft.   Musculoskeletal:         General: Normal range of motion.      Cervical back: Normal range of motion.      Right lower leg: No edema.      Left lower leg: No edema.   Skin:     General: Skin is warm.      Capillary Refill: Capillary refill takes less than 2 seconds.      Findings: Bruising present.          Neurological:      General: No focal deficit present.      Mental Status: She is alert and oriented to person, place, and time. Mental status is at baseline.   Psychiatric:         Attention and Perception: Attention normal.         Mood and Affect: Mood normal.         Speech: Speech normal.         Behavior: Behavior normal. Behavior is cooperative.         Thought Content: Thought content normal.         Cognition and Memory: Cognition and memory normal. Cognition is not impaired. Memory is not impaired.         Judgment: Judgment normal.         Assessment/Plan   Diagnoses and all orders for this visit:  Acute cystitis without hematuria  Comments:  Culture and sensitivity reviewed.  Orders:  -     ampicillin (Principen) 500 mg capsule; Take 1 capsule (500 mg) by mouth every 6 hours for 7 days.  Fall in elderly patient  Comments:  Continue to work with  PT.

## 2024-11-13 ENCOUNTER — HOSPITAL ENCOUNTER (EMERGENCY)
Facility: HOSPITAL | Age: 89
Discharge: HOME | End: 2024-11-13
Attending: EMERGENCY MEDICINE
Payer: MEDICARE

## 2024-11-13 ENCOUNTER — APPOINTMENT (OUTPATIENT)
Dept: RADIOLOGY | Facility: HOSPITAL | Age: 89
End: 2024-11-13
Payer: MEDICARE

## 2024-11-13 ENCOUNTER — NURSING HOME VISIT (OUTPATIENT)
Dept: POST ACUTE CARE | Facility: EXTERNAL LOCATION | Age: 89
End: 2024-11-13

## 2024-11-13 VITALS
SYSTOLIC BLOOD PRESSURE: 124 MMHG | TEMPERATURE: 97.2 F | DIASTOLIC BLOOD PRESSURE: 92 MMHG | WEIGHT: 85.4 LBS | RESPIRATION RATE: 18 BRPM | HEART RATE: 65 BPM | OXYGEN SATURATION: 99 % | HEIGHT: 60 IN | BODY MASS INDEX: 16.77 KG/M2
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
## 2024-11-13 DIAGNOSIS — S09.90XA HEAD INJURY, INITIAL ENCOUNTER: Primary | ICD-10-CM

## 2024-11-13 DIAGNOSIS — R29.6 FALL IN ELDERLY PATIENT: Primary | ICD-10-CM

## 2024-11-13 LAB
APPEARANCE UR: CLEAR
BILIRUB UR STRIP.AUTO-MCNC: NEGATIVE MG/DL
COLOR UR: COLORLESS
GLUCOSE UR STRIP.AUTO-MCNC: NORMAL MG/DL
KETONES UR STRIP.AUTO-MCNC: NEGATIVE MG/DL
LEUKOCYTE ESTERASE UR QL STRIP.AUTO: NEGATIVE
NITRITE UR QL STRIP.AUTO: NEGATIVE
PH UR STRIP.AUTO: 7.5 [PH]
PROT UR STRIP.AUTO-MCNC: NEGATIVE MG/DL
RBC # UR STRIP.AUTO: NEGATIVE /UL
SP GR UR STRIP.AUTO: 1
UROBILINOGEN UR STRIP.AUTO-MCNC: NORMAL MG/DL

## 2024-11-13 PROCEDURE — 72125 CT NECK SPINE W/O DYE: CPT

## 2024-11-13 PROCEDURE — 81003 URINALYSIS AUTO W/O SCOPE: CPT | Performed by: EMERGENCY MEDICINE

## 2024-11-13 PROCEDURE — 2500000001 HC RX 250 WO HCPCS SELF ADMINISTERED DRUGS (ALT 637 FOR MEDICARE OP): Performed by: EMERGENCY MEDICINE

## 2024-11-13 PROCEDURE — 72170 X-RAY EXAM OF PELVIS: CPT

## 2024-11-13 PROCEDURE — 72125 CT NECK SPINE W/O DYE: CPT | Performed by: STUDENT IN AN ORGANIZED HEALTH CARE EDUCATION/TRAINING PROGRAM

## 2024-11-13 PROCEDURE — 71046 X-RAY EXAM CHEST 2 VIEWS: CPT

## 2024-11-13 PROCEDURE — 71046 X-RAY EXAM CHEST 2 VIEWS: CPT | Performed by: RADIOLOGY

## 2024-11-13 PROCEDURE — 70450 CT HEAD/BRAIN W/O DYE: CPT

## 2024-11-13 PROCEDURE — 72170 X-RAY EXAM OF PELVIS: CPT | Performed by: RADIOLOGY

## 2024-11-13 PROCEDURE — 99285 EMERGENCY DEPT VISIT HI MDM: CPT | Mod: 25

## 2024-11-13 PROCEDURE — 99348 HOME/RES VST EST LOW MDM 30: CPT

## 2024-11-13 PROCEDURE — 70450 CT HEAD/BRAIN W/O DYE: CPT | Performed by: STUDENT IN AN ORGANIZED HEALTH CARE EDUCATION/TRAINING PROGRAM

## 2024-11-13 RX ORDER — ACETAMINOPHEN 325 MG/1
975 TABLET ORAL ONCE
Status: COMPLETED | OUTPATIENT
Start: 2024-11-13 | End: 2024-11-13

## 2024-11-13 ASSESSMENT — PAIN SCALES - GENERAL
PAINLEVEL_OUTOF10: 0 - NO PAIN
PAINLEVEL_OUTOF10: 0 - NO PAIN

## 2024-11-13 ASSESSMENT — PAIN - FUNCTIONAL ASSESSMENT
PAIN_FUNCTIONAL_ASSESSMENT: 0-10
PAIN_FUNCTIONAL_ASSESSMENT: 0-10

## 2024-11-13 NOTE — ED PROVIDER NOTES
"HPI   Chief Complaint   Patient presents with    Fall     BIB ems from [facility]. Nurse entered room to admin medications and saw hematoma on left forehead. Per nurse, pt must have fallen and got herself back up in bed independently. Takes asa 81mg daily       89-year-old female with history of dementia presents for suspected fall at her skilled nursing facility.  Nursing went in to give her her medications noted she had a large hematoma on her left forehead.  Patient is somewhat of a poor historian not entirely certain how she fell but believes she was coming back from or going to the bathroom.  She recalls falling forward and holding herself up on her forearms until she was \"certain that nothing was broken\" then got up and went back to bed.  She is complaining of some soreness in her hips and neck.  She takes aspirin but no anticoagulation.  Denies any other significant areas of pain or injury.  Denies any recent ill symptoms, dizziness or headache.      History provided by:  Patient, medical records and EMS personnel  History limited by:  Dementia          Patient History   Past Medical History:   Diagnosis Date    Age-related nuclear cataract of left eye 09/03/2023    Anxiety 09/03/2023    Benign paroxysmal positional vertigo 09/03/2023    Coronary artery disease 09/03/2023    Gastroesophageal reflux disease 09/03/2023    Hip dislocation, right, initial encounter (Multi) 08/23/2024    Hyperlipidemia 09/03/2023    Osteoarthritis 09/03/2023    Unspecified cataract     Cataract of right eye, unspecified cataract type    Urinary incontinence 09/03/2023    Venous thrombosis 09/03/2023    Vitamin D deficiency 09/03/2023     Past Surgical History:   Procedure Laterality Date    CATARACT EXTRACTION  09/04/2018    Cataract Surgery    CT GUIDED IMAGING FOR NEEDLE PLACEMENT  9/27/2016    CT GUIDED IMAGING FOR NEEDLE PLACEMENT [facility]     Family History   Problem Relation Name Age of Onset    " Hypertension Mother      Stroke Mother      Arthritis Father      Parkinsonism Sister      Dementia Sister      Cancer Maternal Grandfather      Heart disease Other [facility]     Hypertension Other [facility]     Stroke Other [facility]     Hypertension Sibling      Stroke Sibling       Social History     Tobacco Use    Smoking status: Never    Smokeless tobacco: Never   Substance Use Topics    Alcohol use: Never    Drug use: Never       Physical Exam   ED Triage Vitals [11/13/24 0558]   Temperature Heart Rate Respirations BP   36.2 °C (97.2 °F) 59 17 151/72      Pulse Ox Temp Source Heart Rate Source Patient Position   98 % Temporal Monitor Sitting      BP Location FiO2 (%)     Left arm --       Physical Exam  Vitals and nursing note reviewed.     General: Vitals reviewed. Awake, alert, well-developed, well-nourished, NAD, airway patent  HEENT: NC, hematoma over left frontal scalp, no bony step-offs, PERRL, MMM, no facial contusions,  no bleeding per nares, septum midline   Neck: Supple, trachea midline, no midline C-spine tenderness , step-offs , or deformities, no expanding hematomas, no stridor   Respiratory: No respiratory distress, lungs clear to auscultation bilaterally, no wheezes, rhonchi, or rales  CV: Regular rate and regular rhythm, no murmur/gallop/rubs  Abdomen/GI: Soft, non-tender, non-distended, no rebound, guarding, or rigidity, normal bowel sounds  Extremities/MSK : Moving all extremities, no deformities, mild tenderness over bilateral hips/pelvis, no notable abrasions or contusions, no other bony tenderness to palpation in the extremities and range of motion of all joints within normal limits, no midline TLS tenderness, step-offs , or deformities, small contusion over left lateral upper arm but no bony tenderness palpation in this area, 2+ symmetrical bilateral radial and DP pulses  Neuro: A/Ox2, GCS 14 which is her baseline due to dementia uncertain of date/time, cranial nerves intact, no new focal motor or sensory  deficits  Skin: Warm, dry. No rashes identified      ED Course & MDM   Diagnoses as of 11/14/24 0825   Head injury, initial encounter   Fall, initial encounter                 No data recorded     [place] Coma Scale Score: 15 (11/13/24 0600 : Maxwell Singh, [facility])                           Medical Decision Making  89-year-old female presents for suspected head injury/unwitnessed fall although was found in bed by nursing facility.  She does have baseline dementia limiting history which was assisted by EMS and chart review.  ABCs intact, GCS 14 which is her baseline she is disoriented to time which is per chart review her usual mental state.  She is otherwise hemodynamically stable nontoxic-appearing with a large left frontal forehead hematoma small contusion over the left upper arm with no bony tenderness palpation range of motion of all joints within normal limits although tender over bilateral hip/pelvis.  Will obtain CT brain to rule out intracranial hemorrhage, CT cervical spine to rule out cervical fracture as well as x-ray chest and pelvis to rule out fractures in these areas as well.  Patient denies any symptoms of lightheadedness or dizziness therefore low suspicion for arrhythmia, syncope, CVA and do not feel further workup is indicated for these etiologies at this time.  CT brain, cervical spine with no acute traumatic injuries.  X-ray chest and pelvis on my independent interpretation with no acute traumatic injuries.  Urinalysis negative for UTI.  Patient treated symptomatically with Tylenol with improvement.  At this time do feel she is stable for discharge back to her facility with close head injury instructions.  Return precautions discussed.    Amount and/or Complexity of Data Reviewed  Independent Historian: caregiver and EMS     Details: EMS provides much of history from skilled nursing facility.  Family at bedside also contributes.  External Data Reviewed: notes.     Details: 11/7/2024 outpatient  primary care note reviewed  Labs: ordered. Decision-making details documented in ED Course.  Radiology: ordered and independent interpretation performed. Decision-making details documented in ED Course.        Procedure  Procedures     Priyanka Clancy MD  11/14/24 7117

## 2024-11-13 NOTE — LETTER
"Patient: Mariaelena Tavarez  : 1935    Encounter Date: 2024    Subjective  Patient ID: Mariaelena Tavarez is a 89 y.o. female who is assisted living/ home patient being seen at [facility], and evaluated for status post fall.     HPI   Pt visited in secured memory care unit. Pt with an unwitnessed fall in apartment. Nursing reports \"pt broke the paper towel mcleod with her head\". Pt with a significant hematoma to left frontal forehead. Significant ecchymosis around left eye. Pt went to ER for evaluation and treatment. Pt currently on aspirin 81mg. ER found no significant findings and returned to assisted living. Pt denies pain, headaches, dizziness, blurred vision, and nausea/vomiting. Pt eating without difficulty. Appetite is fair. Pt denies chest pain and sob at rest/exertion. Pt denies voiding symptoms and constipation. UA obtained in ER, culture still pending. Pt with full ROM of extremities at baseline. Pt states \"I feel fine, nothing hurts\".     Current Outpatient Medications on File Prior to Visit   Medication Sig Dispense Refill   • acetaminophen (Tylenol) 325 mg tablet Take 2 tablets (650 mg) by mouth every 4 hours if needed for mild pain (1 - 3), headaches or fever (temp greater than 38.0 C).     • alum-mag hydroxide-simeth (Mylanta) 200-200-20 mg/5 mL oral suspension Take 30 mL by mouth every 4 hours if needed for indigestion or heartburn.     • amLODIPine (Norvasc) 10 mg tablet Take 1 tablet (10 mg) by mouth once daily. (Patient taking differently: Take 1 tablet (10 mg) by mouth once daily. Hold for SBP less than 120, and HR less than 60.) 90 tablet 3   • aspirin 81 mg chewable tablet Chew 1 tablet (81 mg) once daily.     • atenolol (Tenormin) 25 mg tablet Take 1 tablet (25 mg) by mouth 2 times a day. 25 mg 2x a day (Patient taking differently: Take 1 tablet (25 mg) by mouth 2 times a day. Hold for SBP less than 120, and HR less than 60.) 180 tablet 3   • cholecalciferol (Vitamin D-3) 50 MCG " (2000 UT) tablet Take 1 tablet (2,000 Units) by mouth once daily.     • cranberry fruit (cranberry) 450 mg tablet Take 1 tablet by mouth 2 times a day. 90 tablet 3   • d-mannose 500 mg capsule Take 1 capsule by mouth once daily. 30 capsule 3   • donepezil (Aricept) 10 mg tablet TAKE ONE TABLET BY MOUTH DAILY AT BEDTIME AS DIRECTED 90 tablet 1   • escitalopram (Lexapro) 5 mg tablet 1 tablet a day 90 tablet 3   • gabapentin (Neurontin) 100 mg capsule Take 1 capsule (100 mg) by mouth 3 times a day. 90 capsule 5   • levothyroxine (Synthroid, Levoxyl) 25 mcg tablet Take 1 tablet (25 mcg) by mouth once daily in the morning. Take before meals. . for 90 90 tablet 3   • magnesium hydroxide (Milk of Magnesia) 400 mg/5 mL suspension Take 30 mL by mouth once daily as needed for constipation (If no BM in 3 days.).     • pantoprazole (ProtoNix) 40 mg EC tablet Take 1 tablet (40 mg) by mouth once daily. 90 tablet 3   • rosuvastatin (Crestor) 40 mg tablet Take 1 tablet (40 mg) by mouth once daily. 90 tablet 3   • traMADol (Ultram) 50 mg tablet Take 1 tablet (50 mg) by mouth every 6 hours if needed for severe pain (7 - 10) for up to 10 doses. 10 tablet 0     No current facility-administered medications on file prior to visit.      Review of Systems   Constitutional:  Positive for activity change. Negative for appetite change, chills, fatigue and fever.   HENT:  Negative for congestion, hearing loss and rhinorrhea.    Eyes:  Positive for visual disturbance.        Glasses   Respiratory:  Negative for cough and shortness of breath.    Cardiovascular:  Negative for chest pain, palpitations and leg swelling.   Gastrointestinal:  Negative for constipation, diarrhea, nausea and vomiting.   Genitourinary:  Negative for dysuria and frequency.   Musculoskeletal:  Positive for arthralgias and gait problem.        Utilizing wheelchair for mobility   Neurological:  Positive for weakness. Negative for dizziness, light-headedness and headaches.    Psychiatric/Behavioral:  Negative for sleep disturbance. The patient is not nervous/anxious.        Objective  /58 (BP Location: Left arm, Patient Position: Sitting, BP Cuff Size: Adult)   Pulse 63   Resp 16   SpO2 97%     Physical Exam  Constitutional:       General: She is awake. She is not in acute distress.     Appearance: Normal appearance. She is not ill-appearing.   HENT:      Head:        Comments: Raised soft hematoma, approximately size of golf ball.      Nose: No congestion or rhinorrhea.      Mouth/Throat:      Mouth: Mucous membranes are moist.   Eyes:      Conjunctiva/sclera: Conjunctivae normal.      Pupils: Pupils are equal, round, and reactive to light.        Comments: ecchymosis   Cardiovascular:      Rate and Rhythm: Normal rate and regular rhythm.      Pulses: Normal pulses.      Heart sounds: Normal heart sounds.   Pulmonary:      Effort: Pulmonary effort is normal. No respiratory distress.      Breath sounds: Normal breath sounds. No wheezing or rhonchi.   Abdominal:      General: Bowel sounds are normal.      Palpations: Abdomen is soft.   Musculoskeletal:         General: Normal range of motion.      Cervical back: Normal range of motion.      Right lower leg: No edema.      Left lower leg: No edema.   Skin:     General: Skin is warm.      Capillary Refill: Capillary refill takes less than 2 seconds.      Findings: Bruising present.          Neurological:      General: No focal deficit present.      Mental Status: She is alert and oriented to person, place, and time. Mental status is at baseline.   Psychiatric:         Attention and Perception: Attention normal.         Mood and Affect: Mood normal.         Speech: Speech normal.         Behavior: Behavior normal. Behavior is cooperative.         Thought Content: Thought content normal.         Cognition and Memory: Cognition and memory normal. Cognition is not impaired. Memory is not impaired.         Judgment: Judgment normal.          Assessment/Plan  Diagnoses and all orders for this visit:  Fall in elderly patient  Comments:  Refer to PT/OT to eval and treat.            Electronically Signed By: DESTINEE Bhatia   11/17/24 12:57 PM

## 2024-11-13 NOTE — DISCHARGE INSTRUCTIONS
"After a head injury it is recommended that you rest your brain for 24 hours which means avoid \"screen time\" such as tv, computer, or phone and rest in a quiet space as much as possible. You should avoid any contact sports or other activities that put you at risk for a second head injury for at least one week or until all of your symptoms have resolved.  Over-the-counter medications as needed for pain.    Post-concussive syndrome can occur after head injury and includes headache, nausea, lightheadedness, difficulty concentrating, and many other symptoms that may last days to weeks, but most often resolve on their own.     There is a small percentage of patients who will have delayed bleeding in the brain after a head injury, especially if they are on blood thinner medications. This can develop in the first 12-24 hours after the injury.  If you develop any significant headaches, persistent vomiting, change in mental status, weakness in an arm or leg, confusion, or other neurologic symptoms, please return to the emergency department immediately for reevaluation.    "

## 2024-11-15 ASSESSMENT — PAIN SCALES - GENERAL: PAINLEVEL_OUTOF10: 0-NO PAIN

## 2024-11-17 VITALS
HEART RATE: 63 BPM | OXYGEN SATURATION: 97 % | SYSTOLIC BLOOD PRESSURE: 110 MMHG | RESPIRATION RATE: 16 BRPM | DIASTOLIC BLOOD PRESSURE: 58 MMHG

## 2024-11-17 ASSESSMENT — ENCOUNTER SYMPTOMS
ACTIVITY CHANGE: 1
FATIGUE: 0
DYSURIA: 0
HEADACHES: 0
NERVOUS/ANXIOUS: 0
FEVER: 0
VOMITING: 0
COUGH: 0
LIGHT-HEADEDNESS: 0
SHORTNESS OF BREATH: 0
RHINORRHEA: 0
DIZZINESS: 0
ARTHRALGIAS: 1
CONSTIPATION: 0
DIARRHEA: 0
SLEEP DISTURBANCE: 0
APPETITE CHANGE: 0
CHILLS: 0
PALPITATIONS: 0
WEAKNESS: 1
NAUSEA: 0
FREQUENCY: 0

## 2024-11-17 NOTE — PROGRESS NOTES
"Subjective   Patient ID: Mariaelena Tavarez is a 89 y.o. female who is assisted living/ home patient being seen at [facility], and evaluated for status post fall.     HPI   Pt visited in secured memory care unit. Pt with an unwitnessed fall in apartment. Nursing reports \"pt broke the paper towel mcleod with her head\". Pt with a significant hematoma to left frontal forehead. Significant ecchymosis around left eye. Pt went to ER for evaluation and treatment. Pt currently on aspirin 81mg. ER found no significant findings and returned to assisted living. Pt denies pain, headaches, dizziness, blurred vision, and nausea/vomiting. Pt eating without difficulty. Appetite is fair. Pt denies chest pain and sob at rest/exertion. Pt denies voiding symptoms and constipation. UA obtained in ER, culture still pending. Pt with full ROM of extremities at baseline. Pt states \"I feel fine, nothing hurts\".     Current Outpatient Medications on File Prior to Visit   Medication Sig Dispense Refill    acetaminophen (Tylenol) 325 mg tablet Take 2 tablets (650 mg) by mouth every 4 hours if needed for mild pain (1 - 3), headaches or fever (temp greater than 38.0 C).      alum-mag hydroxide-simeth (Mylanta) 200-200-20 mg/5 mL oral suspension Take 30 mL by mouth every 4 hours if needed for indigestion or heartburn.      amLODIPine (Norvasc) 10 mg tablet Take 1 tablet (10 mg) by mouth once daily. (Patient taking differently: Take 1 tablet (10 mg) by mouth once daily. Hold for SBP less than 120, and HR less than 60.) 90 tablet 3    aspirin 81 mg chewable tablet Chew 1 tablet (81 mg) once daily.      atenolol (Tenormin) 25 mg tablet Take 1 tablet (25 mg) by mouth 2 times a day. 25 mg 2x a day (Patient taking differently: Take 1 tablet (25 mg) by mouth 2 times a day. Hold for SBP less than 120, and HR less than 60.) 180 tablet 3    cholecalciferol (Vitamin D-3) 50 MCG (2000 UT) tablet Take 1 tablet (2,000 Units) by mouth once daily.      " cranberry fruit (cranberry) 450 mg tablet Take 1 tablet by mouth 2 times a day. 90 tablet 3    d-mannose 500 mg capsule Take 1 capsule by mouth once daily. 30 capsule 3    donepezil (Aricept) 10 mg tablet TAKE ONE TABLET BY MOUTH DAILY AT BEDTIME AS DIRECTED 90 tablet 1    escitalopram (Lexapro) 5 mg tablet 1 tablet a day 90 tablet 3    gabapentin (Neurontin) 100 mg capsule Take 1 capsule (100 mg) by mouth 3 times a day. 90 capsule 5    levothyroxine (Synthroid, Levoxyl) 25 mcg tablet Take 1 tablet (25 mcg) by mouth once daily in the morning. Take before meals. . for 90 90 tablet 3    magnesium hydroxide (Milk of Magnesia) 400 mg/5 mL suspension Take 30 mL by mouth once daily as needed for constipation (If no BM in 3 days.).      pantoprazole (ProtoNix) 40 mg EC tablet Take 1 tablet (40 mg) by mouth once daily. 90 tablet 3    rosuvastatin (Crestor) 40 mg tablet Take 1 tablet (40 mg) by mouth once daily. 90 tablet 3    traMADol (Ultram) 50 mg tablet Take 1 tablet (50 mg) by mouth every 6 hours if needed for severe pain (7 - 10) for up to 10 doses. 10 tablet 0     No current facility-administered medications on file prior to visit.      Review of Systems   Constitutional:  Positive for activity change. Negative for appetite change, chills, fatigue and fever.   HENT:  Negative for congestion, hearing loss and rhinorrhea.    Eyes:  Positive for visual disturbance.        Glasses   Respiratory:  Negative for cough and shortness of breath.    Cardiovascular:  Negative for chest pain, palpitations and leg swelling.   Gastrointestinal:  Negative for constipation, diarrhea, nausea and vomiting.   Genitourinary:  Negative for dysuria and frequency.   Musculoskeletal:  Positive for arthralgias and gait problem.        Utilizing wheelchair for mobility   Neurological:  Positive for weakness. Negative for dizziness, light-headedness and headaches.   Psychiatric/Behavioral:  Negative for sleep disturbance. The patient is not  nervous/anxious.        Objective   /58 (BP Location: Left arm, Patient Position: Sitting, BP Cuff Size: Adult)   Pulse 63   Resp 16   SpO2 97%     Physical Exam  Constitutional:       General: She is awake. She is not in acute distress.     Appearance: Normal appearance. She is not ill-appearing.   HENT:      Head:        Comments: Raised soft hematoma, approximately size of golf ball.      Nose: No congestion or rhinorrhea.      Mouth/Throat:      Mouth: Mucous membranes are moist.   Eyes:      Conjunctiva/sclera: Conjunctivae normal.      Pupils: Pupils are equal, round, and reactive to light.        Comments: ecchymosis   Cardiovascular:      Rate and Rhythm: Normal rate and regular rhythm.      Pulses: Normal pulses.      Heart sounds: Normal heart sounds.   Pulmonary:      Effort: Pulmonary effort is normal. No respiratory distress.      Breath sounds: Normal breath sounds. No wheezing or rhonchi.   Abdominal:      General: Bowel sounds are normal.      Palpations: Abdomen is soft.   Musculoskeletal:         General: Normal range of motion.      Cervical back: Normal range of motion.      Right lower leg: No edema.      Left lower leg: No edema.   Skin:     General: Skin is warm.      Capillary Refill: Capillary refill takes less than 2 seconds.      Findings: Bruising present.          Neurological:      General: No focal deficit present.      Mental Status: She is alert and oriented to person, place, and time. Mental status is at baseline.   Psychiatric:         Attention and Perception: Attention normal.         Mood and Affect: Mood normal.         Speech: Speech normal.         Behavior: Behavior normal. Behavior is cooperative.         Thought Content: Thought content normal.         Cognition and Memory: Cognition and memory normal. Cognition is not impaired. Memory is not impaired.         Judgment: Judgment normal.         Assessment/Plan   Diagnoses and all orders for this visit:  Fall in  elderly patient  Comments:  Refer to PT/OT to eval and treat.

## 2025-01-19 ENCOUNTER — HOSPITAL ENCOUNTER (EMERGENCY)
Facility: HOSPITAL | Age: OVER 89
Discharge: HOME | End: 2025-01-19
Attending: STUDENT IN AN ORGANIZED HEALTH CARE EDUCATION/TRAINING PROGRAM
Payer: MEDICARE

## 2025-01-19 ENCOUNTER — APPOINTMENT (OUTPATIENT)
Dept: RADIOLOGY | Facility: HOSPITAL | Age: OVER 89
End: 2025-01-19
Payer: MEDICARE

## 2025-01-19 VITALS
HEART RATE: 70 BPM | SYSTOLIC BLOOD PRESSURE: 116 MMHG | OXYGEN SATURATION: 95 % | HEIGHT: 60 IN | DIASTOLIC BLOOD PRESSURE: 68 MMHG | TEMPERATURE: 97.7 F | RESPIRATION RATE: 12 BRPM | BODY MASS INDEX: 19.18 KG/M2 | WEIGHT: 97.7 LBS

## 2025-01-19 DIAGNOSIS — S73.004A DISLOCATION OF RIGHT HIP, INITIAL ENCOUNTER (MULTI): Primary | ICD-10-CM

## 2025-01-19 PROCEDURE — 73501 X-RAY EXAM HIP UNI 1 VIEW: CPT | Mod: RT

## 2025-01-19 PROCEDURE — 73502 X-RAY EXAM HIP UNI 2-3 VIEWS: CPT | Mod: RT

## 2025-01-19 PROCEDURE — 96375 TX/PRO/DX INJ NEW DRUG ADDON: CPT | Mod: 59

## 2025-01-19 PROCEDURE — 2500000004 HC RX 250 GENERAL PHARMACY W/ HCPCS (ALT 636 FOR OP/ED): Performed by: PHYSICIAN ASSISTANT

## 2025-01-19 PROCEDURE — 99152 MOD SED SAME PHYS/QHP 5/>YRS: CPT | Performed by: STUDENT IN AN ORGANIZED HEALTH CARE EDUCATION/TRAINING PROGRAM

## 2025-01-19 PROCEDURE — 96361 HYDRATE IV INFUSION ADD-ON: CPT

## 2025-01-19 PROCEDURE — 73502 X-RAY EXAM HIP UNI 2-3 VIEWS: CPT | Mod: RIGHT SIDE | Performed by: RADIOLOGY

## 2025-01-19 PROCEDURE — 96374 THER/PROPH/DIAG INJ IV PUSH: CPT

## 2025-01-19 PROCEDURE — 99285 EMERGENCY DEPT VISIT HI MDM: CPT | Performed by: STUDENT IN AN ORGANIZED HEALTH CARE EDUCATION/TRAINING PROGRAM

## 2025-01-19 PROCEDURE — 27265 TREAT HIP DISLOCATION: CPT | Performed by: PHYSICIAN ASSISTANT

## 2025-01-19 PROCEDURE — 27265 TREAT HIP DISLOCATION: CPT | Mod: RT

## 2025-01-19 PROCEDURE — 2500000005 HC RX 250 GENERAL PHARMACY W/O HCPCS: Performed by: STUDENT IN AN ORGANIZED HEALTH CARE EDUCATION/TRAINING PROGRAM

## 2025-01-19 PROCEDURE — 2500000004 HC RX 250 GENERAL PHARMACY W/ HCPCS (ALT 636 FOR OP/ED): Performed by: STUDENT IN AN ORGANIZED HEALTH CARE EDUCATION/TRAINING PROGRAM

## 2025-01-19 RX ORDER — FENTANYL CITRATE 50 UG/ML
50 INJECTION, SOLUTION INTRAMUSCULAR; INTRAVENOUS ONCE
Status: COMPLETED | OUTPATIENT
Start: 2025-01-19 | End: 2025-01-19

## 2025-01-19 RX ORDER — PROPOFOL 10 MG/ML
0.5 INJECTION, EMULSION INTRAVENOUS AS NEEDED
Status: DISCONTINUED | OUTPATIENT
Start: 2025-01-19 | End: 2025-01-20 | Stop reason: HOSPADM

## 2025-01-19 RX ORDER — MORPHINE SULFATE 4 MG/ML
4 INJECTION, SOLUTION INTRAMUSCULAR; INTRAVENOUS ONCE
Status: COMPLETED | OUTPATIENT
Start: 2025-01-19 | End: 2025-01-19

## 2025-01-19 RX ORDER — ONDANSETRON HYDROCHLORIDE 2 MG/ML
4 INJECTION, SOLUTION INTRAVENOUS ONCE
Status: COMPLETED | OUTPATIENT
Start: 2025-01-19 | End: 2025-01-19

## 2025-01-19 RX ORDER — PROPOFOL 10 MG/ML
1 INJECTION, EMULSION INTRAVENOUS ONCE
Status: COMPLETED | OUTPATIENT
Start: 2025-01-19 | End: 2025-01-19

## 2025-01-19 RX ADMIN — MORPHINE SULFATE 4 MG: 4 INJECTION, SOLUTION INTRAMUSCULAR; INTRAVENOUS at 20:25

## 2025-01-19 RX ADMIN — PROPOFOL 22 MG: 10 INJECTION, EMULSION INTRAVENOUS at 21:48

## 2025-01-19 RX ADMIN — SODIUM CHLORIDE 500 ML: 900 INJECTION, SOLUTION INTRAVENOUS at 21:32

## 2025-01-19 RX ADMIN — ONDANSETRON 4 MG: 2 INJECTION INTRAMUSCULAR; INTRAVENOUS at 20:25

## 2025-01-19 RX ADMIN — PROPOFOL 44 MG: 10 INJECTION, EMULSION INTRAVENOUS at 21:32

## 2025-01-19 RX ADMIN — FENTANYL CITRATE 50 MCG: 50 INJECTION INTRAMUSCULAR; INTRAVENOUS at 21:32

## 2025-01-19 RX ADMIN — Medication 4 L/MIN: at 22:02

## 2025-01-19 ASSESSMENT — PAIN DESCRIPTION - LOCATION
LOCATION: HIP
LOCATION: HIP

## 2025-01-19 ASSESSMENT — PAIN - FUNCTIONAL ASSESSMENT: PAIN_FUNCTIONAL_ASSESSMENT: 0-10

## 2025-01-19 ASSESSMENT — PAIN DESCRIPTION - FREQUENCY: FREQUENCY: CONSTANT/CONTINUOUS

## 2025-01-19 ASSESSMENT — PAIN DESCRIPTION - ORIENTATION
ORIENTATION: RIGHT
ORIENTATION: RIGHT

## 2025-01-19 ASSESSMENT — PAIN SCALES - GENERAL
PAINLEVEL_OUTOF10: 7
PAINLEVEL_OUTOF10: 7

## 2025-01-19 ASSESSMENT — PAIN DESCRIPTION - DESCRIPTORS: DESCRIPTORS: SHARP;DISCOMFORT

## 2025-01-19 ASSESSMENT — PAIN DESCRIPTION - PROGRESSION: CLINICAL_PROGRESSION: NOT CHANGED

## 2025-01-19 ASSESSMENT — PAIN DESCRIPTION - ONSET: ONSET: SUDDEN

## 2025-01-19 ASSESSMENT — PAIN DESCRIPTION - PAIN TYPE: TYPE: ACUTE PAIN

## 2025-01-20 NOTE — NURSING NOTE
Assisted in conscious sedation on a hip dislocation. Pt. Was placed on a 2 L NC with entitle CO2 26...during procedure pt. began to desat to 60% and had to bag pt. for not even 1 min. Pts. SAT began to improve right away to 97%. Pt. was then placed on 4 L NC and stayed at 97%. Pt. Slowly woke up and is sating fine on the 4 L NC SAT 97%.

## 2025-01-20 NOTE — ED PROVIDER NOTES
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 No blood thinners, no headstrike, no LOC. Pt has shortening of right leg. Pt has been unable to bare weight. Pt reports bilateral hip replacements and states her hips have dislocated before.        89-year-old female presented emergency department with a chief complaint of pain in her right hip.  She states that she bent over felt her hip pop.  She has a prosthetic hip.  She is dislocated her previously.  She believes it is dislocated.  Pain is severe.  No head injury or trauma.  Well-appearing nontoxic afebrile.  Denies lightheadedness dizziness numbness weakness.  Denies chest pain shortness of breath.  No other complaint.              Patient History   Past Medical History:   Diagnosis Date    Age-related nuclear cataract of left eye 09/03/2023    Anxiety 09/03/2023    Benign paroxysmal positional vertigo 09/03/2023    Coronary artery disease 09/03/2023    Gastroesophageal reflux disease 09/03/2023    Hip dislocation, right, initial encounter (Multi) 08/23/2024    Hyperlipidemia 09/03/2023    Osteoarthritis 09/03/2023    Unspecified cataract     Cataract of right eye, unspecified cataract type    Urinary incontinence 09/03/2023    Venous thrombosis 09/03/2023    Vitamin D deficiency 09/03/2023     Past Surgical History:   Procedure Laterality Date    CATARACT EXTRACTION  09/04/2018    Cataract Surgery    CT GUIDED IMAGING FOR NEEDLE PLACEMENT  9/27/2016    CT GUIDED IMAGING FOR NEEDLE PLACEMENT [facility]     Family History   Problem Relation Name Age of Onset    Hypertension Mother      Stroke Mother      Arthritis Father      Parkinsonism Sister      Dementia Sister      Cancer Maternal Grandfather      Heart disease Other [place]     Hypertension Other [place]     Stroke Other [place]     Hypertension Sibling      Stroke Sibling       Social  History     Tobacco Use    Smoking status: Never    Smokeless tobacco: Never   Substance Use Topics    Alcohol use: Never    Drug use: Never       Physical Exam   ED Triage Vitals [01/19/25 2012]   Temperature Heart Rate Respirations BP   36.5 °C (97.7 °F) 60 16 156/70      Pulse Ox Temp Source Heart Rate Source Patient Position   96 % Tympanic Monitor Lying      BP Location FiO2 (%)     Left arm --       Physical Exam  Vitals and nursing note reviewed.   Constitutional:       Appearance: Normal appearance.   HENT:      Head: Normocephalic.      Nose: Nose normal.   Cardiovascular:      Rate and Rhythm: Normal rate.      Pulses: Normal pulses.   Pulmonary:      Effort: Pulmonary effort is normal.   Abdominal:      General: Abdomen is flat.   Musculoskeletal:      Cervical back: Normal range of motion.      Comments: Right lower extremity shortened, externally rotated, there is a deformity at proximal right hip, able to move lower extremity digit   Skin:     General: Skin is warm.   Neurological:      General: No focal deficit present.      Mental Status: She is alert and oriented to person, place, and time.      Comments: Strength limited to pain, sensation intact in distal lower extremities   Psychiatric:         Mood and Affect: Mood normal.           ED Course & MDM                  No data recorded     Hamilton Coma Scale Score: 15 (01/19/25 2014 : Helen Zamora, EMT)                           Medical Decision Making  I have seen and evaluated this patient.  The attending physician has also seen and evaluated this patient.  Vital signs, laboratory testing and diagnostic images if applicable have been reviewed.  All laboratory and imaging is interpreted by myself unless otherwise stated.  Radiology studies are also formally interpreted by radiologist.    X-ray shows dislocated hip.  Patient is sedated and reduction performed with satisfactory alignment.  Remains neurovascularly intact.  Pain control.  Released  in stable condition from urgent appointment with outpatient Ortho follow-up.        Labs Reviewed - No data to display  XR hip right with pelvis when performed 2 or 3 views   Final Result   Superior lateral dislocation of the femoral component of the right   hip arthroplasty.             MACRO:   None        Signed by: Tiago Calix 1/19/2025 9:43 PM   Dictation workstation:   [id number]      XR hip right with pelvis when performed 1 view    (Results Pending)     Medications   oxygen (O2) therapy (4 L/min inhalation Start 1/19/25 2202)   propofol (Diprivan) injection 44 mg (44 mg intravenous Given 1/19/25 2132)     Followed by   propofol (Diprivan) injection 22 mg (22 mg intravenous Given 1/19/25 2148)   morphine injection 4 mg (4 mg intravenous Given 1/19/25 2025)   ondansetron (Zofran) injection 4 mg (4 mg intravenous Given 1/19/25 2025)   sodium chloride 0.9 % bolus 500 mL (0 mL intravenous Stopped 1/19/25 2215)   fentaNYL PF (Sublimaze) injection 50 mcg (50 mcg intravenous Given 1/19/25 2132)     New Prescriptions    No medications on file         Procedure  Orthopaedic Injury Treatment - Lower Extremity    Performed by: Lucian Taylor PA-C  Authorized by: Lucian Taylor PA-C    Consent:     Consent obtained:  Verbal    Risks discussed:  Fracture  Universal protocol:     Procedure explained and questions answered to patient or proxy's satisfaction: yes      Patient identity confirmed:  Verbally with patient  Location:     Location:  Hip    Hip injury location:  R hip    Hip dislocation type: posterior      Etiology: spontaneous      Prosthesis: yes    Pre-procedure details:     Distal perfusion: normal      Range of motion: reduced    Sedation:     Sedation type:  Moderate sedation  Anesthesia:     Anesthesia method:  None  Procedure details:     Manipulation performed: yes      Hip reduction method:  Direct traction    Reduction successful: yes      Reduction confirmed with imaging: yes      Immobilization:   Brace  Post-procedure details:     Neurological function: normal      Distal perfusion: normal      Range of motion: normal      Procedure completion:  Tolerated       Lucian Taylor PA-C  01/19/25 8324

## 2025-01-20 NOTE — ED PROVIDER NOTES
The patient was seen in conjunction with the advanced practice provider, and I performed a substantive portion of the encounter. The following is my supervisory note.    History:  Patient presents to the ED by EMS with family for mechanical fall with right hip pain and gross deformity.  Family states she is experienced right hip dislocation from prior fall couple months back.  Patient states she missed a step and landed on her right hip but denies hitting her head or experiencing LOC.  Denies any neck/back pain.  Denies appreciable numbness/tingling of RLE.  Denies any prodromal symptoms to include headache, vision changes, cardiopulmonary symptoms, GI/ symptoms, or infectious symptoms to include fever/chills.  Denies any other significant systemic symptoms or complaints.  Denies any AC/AP medication use.    VS:  /68   Pulse 70   Temp 36.5 °C (97.7 °F) (Tympanic)   Resp 12   Ht 1.524 m (5')   Wt (!) 44.3 kg (97 lb 11.2 oz)   SpO2 95%   BMI 19.08 kg/m²      Physical exam:  Airway: patent  Breathing: bilateral breath sounds  Circulation: bilateral radial/DP/PT, central carotid/femoral  GCS: 15    CONST: alert, normal appearance, no acute distress, does not appear ill/toxic  HEAD: normocephalic, atraumatic, midface stable  NECK: trachea midline, no midline C-spine tenderness or appreciable spinous process step-offs/deformities  ENT: no septal hematoma or epistaxis, no perioral/intraoral injuries or dental malocclusion  EYES: Pupils 4-2 mm, no periorbital ecchymosis, periorbital ribs intact, EOMI, no appreciable ocular entrapment  CV: RRR, no murmurs, 2+ equal/symmetrical pulses x4, chest wall non-tender   PULM: CTAB, no respiratory distress, not requiring supplemental O2, equal/symmetrical chest wall rise, no appreciable paradoxical movement  ABD: soft, flat/non-tender/non-distended, no mass, no evidence of ecchymosis  MSK: pelvis stable to compression and with mild-mod pain of R hip, gross deformity of  right hip with shortening of RLE compared to LLE, no significant external/internal rotation, neurovascular intact distally of RLE  SKIN: no evidence of wounds/lacerations, no ecchymosis, warm/dry, no pallor  NEURO: A&Ox4, gross strength/motor/sensation intact x4, normal gait  PSYCH: Appropriate affect      I personally reviewed and interpreted the following studies: EKG is N/A, labs are significant for N/A, images are notable for XR (R hip/pelvis) posterior dislocation of the right arthroplasty .      MDM:  Patient presented to the ED for mechanical fall complicated by right hip deformity and shortening.  Concerning PMHx of HTN, HLD, hypothyroidism, GERD, BPPV, CAD, history of right hip dislocation.    Per Chart Review: Nothing pertinent to this ED encounter.    Assessment/evaluation consistent with traumatic posterior right hip dislocation. No concerning history, clinical evidence/work-up, or exam findings for the concerning differentials of fracture, right hip arthroplasty hardware failure, PIJ. These conditions have been thoroughly evaluated and determined to be sufficiently unlikely to be the etiology of patient's presenting symptoms.     Moderate Sedation    Performed by: Juan Sifuentes MD  Authorized by: Juan Sifuentes MD    Consent:     Consent obtained:  Verbal and written    Consent given by:  Patient and healthcare agent    Risks, benefits, and alternatives were discussed: yes      Risks discussed:  Allergic reaction, prolonged hypoxia resulting in organ damage, dysrhythmia, prolonged sedation necessitating reversal, inadequate sedation, respiratory compromise necessitating ventilatory assistance and intubation, nausea and vomiting    Alternatives discussed:  Analgesia without sedation  Universal protocol:     Protocol observed: The universal protocol was observed before the procedure and is documented in the nursing flowsheets    Indications:     Procedure performed:  Dislocation reduction     Procedure necessitating sedation performed by:  Physician performing sedation    Level of sedation:  Moderate  Pre-sedation assessment:     Mouth opening:  3 or more finger widths    Thyromental distance:  3 finger widths    Mallampati score:  III - soft palate, base of uvula visible    Neck mobility: normal      History of difficult intubation: no    Immediate pre-procedure details:     Monitoring: The patient is on appropriate monitoring (Including: 3 or 5 lead EKG, Pulse Oximetry, Capnography, and Blood Pressure monitoring), oxygenation has been addressed, and critical airway and emergency equipment is immediately available before the initiation of sedation      Reassessment: Patient reassessed immediately prior to procedure      Reviewed: vital signs and NPO status    Procedure details (see MAR for exact dosages):     Total sedation time (minutes):  15  Post-procedure details:     Attendance: Constant attendance by certified staff until patient recovered      Procedure completion:  Tolerated      ED Course/Diagnosis:  Diagnoses as of 01/20/25 1012   Dislocation of right hip, initial encounter (Multi)       1. Dislocation of right hip, initial encounter (Multi)                 Juan Sifuentes MD  01/20/25 1012

## 2025-01-22 ENCOUNTER — APPOINTMENT (OUTPATIENT)
Dept: RADIOLOGY | Facility: HOSPITAL | Age: OVER 89
End: 2025-01-22
Payer: MEDICARE

## 2025-01-22 ENCOUNTER — HOSPITAL ENCOUNTER (EMERGENCY)
Facility: HOSPITAL | Age: OVER 89
Discharge: HOME | End: 2025-01-22
Payer: MEDICARE

## 2025-01-22 VITALS
DIASTOLIC BLOOD PRESSURE: 65 MMHG | SYSTOLIC BLOOD PRESSURE: 112 MMHG | OXYGEN SATURATION: 98 % | RESPIRATION RATE: 14 BRPM | TEMPERATURE: 98.6 F | WEIGHT: 98.77 LBS | BODY MASS INDEX: 19.39 KG/M2 | HEIGHT: 60 IN | HEART RATE: 68 BPM

## 2025-01-22 DIAGNOSIS — M25.512 ACUTE PAIN OF LEFT SHOULDER: ICD-10-CM

## 2025-01-22 DIAGNOSIS — S09.90XA CLOSED HEAD INJURY, INITIAL ENCOUNTER: ICD-10-CM
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
## 2025-01-22 PROCEDURE — 99284 EMERGENCY DEPT VISIT MOD MDM: CPT | Mod: 25

## 2025-01-22 PROCEDURE — 72170 X-RAY EXAM OF PELVIS: CPT

## 2025-01-22 PROCEDURE — 2500000001 HC RX 250 WO HCPCS SELF ADMINISTERED DRUGS (ALT 637 FOR MEDICARE OP): Performed by: PHYSICIAN ASSISTANT

## 2025-01-22 PROCEDURE — 73030 X-RAY EXAM OF SHOULDER: CPT | Mod: LT

## 2025-01-22 PROCEDURE — 70450 CT HEAD/BRAIN W/O DYE: CPT

## 2025-01-22 PROCEDURE — 71045 X-RAY EXAM CHEST 1 VIEW: CPT

## 2025-01-22 PROCEDURE — 71045 X-RAY EXAM CHEST 1 VIEW: CPT | Mod: FOREIGN READ | Performed by: RADIOLOGY

## 2025-01-22 PROCEDURE — 72125 CT NECK SPINE W/O DYE: CPT

## 2025-01-22 PROCEDURE — 70450 CT HEAD/BRAIN W/O DYE: CPT | Performed by: RADIOLOGY

## 2025-01-22 PROCEDURE — 72125 CT NECK SPINE W/O DYE: CPT | Performed by: RADIOLOGY

## 2025-01-22 RX ORDER — ACETAMINOPHEN 325 MG/1
975 TABLET ORAL ONCE
Status: COMPLETED | OUTPATIENT
Start: 2025-01-22 | End: 2025-01-22

## 2025-01-22 RX ADMIN — ACETAMINOPHEN 975 MG: 325 TABLET ORAL at 21:46

## 2025-01-22 ASSESSMENT — PAIN - FUNCTIONAL ASSESSMENT
PAIN_FUNCTIONAL_ASSESSMENT: 0-10
PAIN_FUNCTIONAL_ASSESSMENT: 0-10

## 2025-01-22 ASSESSMENT — PAIN SCALES - GENERAL
PAINLEVEL_OUTOF10: 8
PAINLEVEL_OUTOF10: 4

## 2025-01-23 ENCOUNTER — TELEPHONE (OUTPATIENT)
Dept: PRIMARY CARE | Facility: CLINIC | Age: OVER 89
End: 2025-01-23
Payer: MEDICARE

## 2025-01-23 NOTE — DISCHARGE INSTRUCTIONS
Thank you for choosing [facility] for your healthcare needs today!  There were no acute findings on your workup today indicative of acute fracture, dislocation or bleeding.  Tylenol should be taken for relief of pain.  Follow-up with your orthopedic specialist as recommended after today's visit.  A referral has been provided for you.  Return to the emergency department should you develop new or worsening symptoms.  Follow-up with your primary care provider as recommended after today's visit.  Please call to schedule an appointment with them soon as you are able to.

## 2025-01-23 NOTE — TELEPHONE ENCOUNTER
FYI patient had an unwitnessed fall x 2 today.  This last time she was complaining of severe pain on her left side and unable to move her arm.  She was sent out due to inability to move her arm.  I noticed that after a complete work up in the ED, appears that there is no evidence of acute fractures noted.  Looks like she was discharged back to facility.  Thank you

## 2025-01-23 NOTE — ED PROVIDER NOTES
HPI   Chief Complaint   Patient presents with    Fall     Pt bib EMS for LEFT sided body pain that started following a fall around 1800. Pt states she turned a corner slipped on the carpet and fell landing on her left side. Pt states the entire left side hurts but the shoulder the most.        HPI    Is an 89-year-old female presenting for evaluation of left shoulder pain after suffering a fall approximately 3 hours prior to arrival.  Patient states that she was walking down her hallway when she tripped on her pajama pants, causing her to fall downwards onto her left shoulder.  Patient states she is unsure if she hit her head or not.  She is not on any blood thinners.  Patient states that she was recently just here in the emergency department for a fall and had to have her right hip put back into place as it was dislocated.  She is not complaining of any hip pain at this time.  She states that she is not necessarily supposed to be up and walking much because of the dislocation.  She denies any chest pain or shortness of breath.  No head or neck pain.  No abdominal pain, nausea, vomiting, diarrhea or constipation.  No dysuria or hematuria.    Patient History   Past Medical History:   Diagnosis Date    Age-related nuclear cataract of left eye 09/03/2023    Anxiety 09/03/2023    Benign paroxysmal positional vertigo 09/03/2023    Coronary artery disease 09/03/2023    Gastroesophageal reflux disease 09/03/2023    Hip dislocation, right, initial encounter (Multi) 08/23/2024    Hyperlipidemia 09/03/2023    Osteoarthritis 09/03/2023    Unspecified cataract     Cataract of right eye, unspecified cataract type    Urinary incontinence 09/03/2023    Venous thrombosis 09/03/2023    Vitamin D deficiency 09/03/2023     Past Surgical History:   Procedure Laterality Date    CATARACT EXTRACTION  09/04/2018    Cataract Surgery    CT GUIDED IMAGING FOR NEEDLE PLACEMENT  9/27/2016    CT GUIDED IMAGING FOR NEEDLE PLACEMENT [facility]     Family History   Problem Relation Name Age of Onset    Hypertension Mother      Stroke Mother      Arthritis Father      Parkinsonism Sister      Dementia Sister      Cancer Maternal Grandfather      Heart disease Other ukn     Hypertension Other ukn     Stroke Other ukn     Hypertension Sibling      Stroke Sibling       Social History     Tobacco Use    Smoking status: Never    Smokeless tobacco: Never   Substance Use Topics    Alcohol use: Never    Drug use: Never       Physical Exam   ED Triage Vitals [01/22/25 2122]   Temperature Heart Rate Respirations BP   37 °C (98.6 °F) 68 14 112/65      Pulse Ox Temp Source Heart Rate Source Patient Position   98 % Temporal Monitor Sitting      BP Location FiO2 (%)     Right arm --       Physical Exam  Vitals and nursing note reviewed.   Constitutional:       Appearance: Normal appearance.   HENT:      Head: Normocephalic and atraumatic.   Eyes:      Extraocular Movements: Extraocular movements intact.      Pupils: Pupils are equal, round, and reactive to light.   Neck:      Comments: No C-spine tenderness, palpable bony deformities or step-offs.  Cardiovascular:      Rate and Rhythm: Normal rate and regular rhythm.   Pulmonary:      Effort: Pulmonary effort is normal.      Breath sounds: Normal breath sounds.   Abdominal:      General: Abdomen is flat. Bowel sounds are normal.      Palpations: Abdomen is soft.   Musculoskeletal:      Cervical back: Normal range of motion and neck supple.      Comments: No tenderness to palpation or crepitus of the chest.  No pelvic pain or shortening or rotation of the extremities.  There is some mild tenderness to palpation of the left lateral shoulder.  Patient has good range of motion with distal pulses intact.   Neurological:      Mental Status: She is alert.           ED Course & MDM   Diagnoses as of 01/22/25 2333   Fall, initial encounter   Closed head injury, initial encounter   Acute pain of left shoulder                  No data recorded     Hamilton Coma Scale Score: 15 (01/22/25 2126 : Anastasiya Chong RN)                           Medical Decision Making    Parts of this chart have been completed using voice recognition software. Please excuse any errors of transcription. Despite the medical decision making time stamp above-my medical decision making has taken place during the patient's entire visit. My thought process and reason for plan has been formulated from the time that I saw the patient until the time of disposition and is not specific to one specific moment during their visit and furthermore my MDM encompasses this entire chart and not only this text box.      HPI: Detailed above.    Exam: A medically appropriate exam performed, outlined above, given the known history and presentation.    History obtained from: Patient    Social Determinants of Health considered during this visit: Coming from assisted living    EKG interpreted by my attending physician, reviewed by myself.      CT head wo IV contrast   Final Result   CT HEAD:   No acute intracranial abnormality or calvarial fracture.   Senescent change        CT CERVICAL SPINE:   No acute fracture or traumatic malalignment of the cervical spine.   Mild multilevel degenerative disc disease        Signed by: Cesar Newberry 1/22/2025 11:12 PM   Dictation workstation:   [id number]      CT cervical spine wo IV contrast   Final Result   CT HEAD:   No acute intracranial abnormality or calvarial fracture.   Senescent change        CT CERVICAL SPINE:   No acute fracture or traumatic malalignment of the cervical spine.   Mild multilevel degenerative disc disease        Signed by: Cesar Newberry 1/22/2025 11:12 PM   Dictation workstation:   [id number]      XR chest 1 view   Final Result   No acute cardiopulmonary disease.   Signed by Josh Walker,       XR shoulder left 2+ views   Final Result   No acute fracture or malalignment.  Mild degenerative changes.    Signed by Josh Walker DO      XR pelvis 1-2 views   Final Result   No acute fracture or malalignment.  Bilateral total hip   arthroplasties.   Signed by Josh Walker,         Medications   acetaminophen (Tylenol) tablet 975 mg (975 mg oral Given 1/22/25 7482)     Differential diagnoses considered for this visit include: Acute fracture versus dislocation versus acute intracranial processes versus cervical spine instability    Considerations/further MDM:    Patient is a pleasant 89-year-old female presenting for evaluation of left shoulder pain after suffering a fall that occurred approximately 3 hours prior to arrival.  Signs were hemodynamically stable and within normal limits.  There was some tenderness to palpation of the left lateral shoulder without any gross deformity.  Patient had good range of motion but states that it did hurt when she moved her arm.  Given that the patient was unsure if she hit her head, CT head and cervical spine was ordered in addition to a chest x-ray, left shoulder x-ray and pelvic x-ray.  Tylenol was ordered for relief of pain.    CT head shows no acute intracranial abnormality or calvarial fracture.  CT cervical spine shows no fracture or traumatic malalignment.  X-ray of the pelvis shows no acute fracture or malalignment with bilateral total hip arthroplasties present.  X-ray left shoulder shows no acute fracture or malalignment with mild degenerative changes.  X-ray of the chest shows no acute cardiopulmonary disease.  Results were discussed with the patient and her family members at the bedside.  There were no acute findings on her imaging today.  Patient states that her pain was relieved with acetaminophen.  I did recommend taking oral NSAIDs for further pain relief at her facility.  I would send the patient back with a referral to see an orthopedic specialist should her pain change or worsen.  Patient was comfortable with this plan and felt comfortable to return  back to her facility.  Return precautions were discussed.  She was discharged in good condition.    All of the patient's questions were answered to the best of my ability. Patient states understanding that they have been screened for an emergency today, and we have not found any etiology of symptoms that requires emergent treatment or admission to the hospital at this point. They understand that they have not had definitive care day and require follow-up for treatment of their condition. They also state understanding that they may have an emergent condition that may potentially have not of detected at this visit and they must return to the emergency department if they develop any worsening of symptoms or new complaints.    Attending physician Dr. Scott Gonzales was available for consultation.  Patient's history, physical exam, diagnostic studies, and treatment plan were discussed thoroughly.  Procedure  Procedures     Geraldine Damon PA-C  01/22/25 6169

## 2025-01-24 ENCOUNTER — NURSING HOME VISIT (OUTPATIENT)
Dept: POST ACUTE CARE | Facility: EXTERNAL LOCATION | Age: OVER 89
End: 2025-01-24
Payer: MEDICARE

## 2025-01-24 DIAGNOSIS — Z78.9 LIVING IN ASSISTED LIVING: ICD-10-CM

## 2025-01-24 DIAGNOSIS — I10 PRIMARY HYPERTENSION: Primary | ICD-10-CM

## 2025-01-24 DIAGNOSIS — S73.004A HIP DISLOCATION, RIGHT, INITIAL ENCOUNTER (MULTI): ICD-10-CM

## 2025-01-24 PROCEDURE — 99349 HOME/RES VST EST MOD MDM 40: CPT

## 2025-01-24 ASSESSMENT — PAIN SCALES - GENERAL: PAINLEVEL_OUTOF10: 0-NO PAIN

## 2025-01-24 NOTE — LETTER
"Patient: Mariaelena Tavarez  : 1935    Encounter Date: 2025    Subjective  Patient ID: Mariaelena Tavarez is a 89 y.o. female who is assisted living/ home patient being seen at [facility], and evaluated for follow up for ER visit.     ER Follow-up  Associated symptoms include arthralgias and weakness. Pertinent negatives include no chest pain, chills, congestion, coughing, fatigue, fever, headaches, nausea or vomiting.      HPI:   Pt visited in apartment of assisted living. Pt was sent to ER on  after \"bending over and feeling hip pop\" that resulted in a dislocated right hip. Pt was sedated and a reduction was performed, pt returned to AL same day. Pt given a brace to wear to right hip, but unable to tolerate and due to dementia is not compliant with keeping on. Three days later pt had a fall and went to ER, no significant findings and pt returned to AL. Pt denies pain, and states \"I don't have any pain, my hip doesn't hurt\". Pt recalls both events leading to ER. Pt denies fever, chills, headaches, and dizziness. Pt denies chest pain and sob at rest/exertion. Pt denies voiding symptoms and constipation. Pt ambulates with rollator. Collateral information obtained from nursing who reports patient's blood pressure has been low and feels may be contributing to falls. Blood pressure medication with parameters and has been held on occasion. TC with daughter Monica to update on visit, and who is in agreement with tx plan.     Current Outpatient Medications on File Prior to Visit   Medication Sig Dispense Refill   • acetaminophen (Tylenol) 325 mg tablet Take 2 tablets (650 mg) by mouth every 4 hours if needed for mild pain (1 - 3), headaches or fever (temp greater than 38.0 C).     • alum-mag hydroxide-simeth (Mylanta) 200-200-20 mg/5 mL oral suspension Take 30 mL by mouth every 4 hours if needed for indigestion or heartburn.     • amLODIPine (Norvasc) 10 mg tablet Take 1 tablet (10 mg) by mouth once daily. " (Patient taking differently: Take 1 tablet (10 mg) by mouth once daily. Hold for SBP less than 120, and HR less than 60.) 90 tablet 3   • aspirin 81 mg chewable tablet Chew 1 tablet (81 mg) once daily.     • cholecalciferol (Vitamin D-3) 50 MCG (2000 UT) tablet Take 1 tablet (2,000 Units) by mouth once daily.     • cranberry fruit (cranberry) 450 mg tablet Take 1 tablet by mouth 2 times a day. 90 tablet 3   • d-mannose 500 mg capsule Take 1 capsule by mouth once daily. 30 capsule 3   • donepezil (Aricept) 10 mg tablet TAKE ONE TABLET BY MOUTH DAILY AT BEDTIME AS DIRECTED 90 tablet 1   • escitalopram (Lexapro) 5 mg tablet 1 tablet a day 90 tablet 3   • gabapentin (Neurontin) 100 mg capsule Take 1 capsule (100 mg) by mouth 3 times a day. 90 capsule 5   • levothyroxine (Synthroid, Levoxyl) 25 mcg tablet Take 1 tablet (25 mcg) by mouth once daily in the morning. Take before meals. . for 90 90 tablet 3   • magnesium hydroxide (Milk of Magnesia) 400 mg/5 mL suspension Take 30 mL by mouth once daily as needed for constipation (If no BM in 3 days.).     • pantoprazole (ProtoNix) 40 mg EC tablet Take 1 tablet (40 mg) by mouth once daily. 90 tablet 3   • rosuvastatin (Crestor) 40 mg tablet Take 1 tablet (40 mg) by mouth once daily. 90 tablet 3   • traMADol (Ultram) 50 mg tablet Take 1 tablet (50 mg) by mouth every 6 hours if needed for severe pain (7 - 10) for up to 10 doses. 10 tablet 0   • [DISCONTINUED] atenolol (Tenormin) 25 mg tablet Take 1 tablet (25 mg) by mouth 2 times a day. 25 mg 2x a day (Patient taking differently: Take 1 tablet (25 mg) by mouth 2 times a day. Hold for SBP less than 120, and HR less than 60.) 180 tablet 3     No current facility-administered medications on file prior to visit.      Review of Systems   Constitutional:  Negative for activity change, appetite change, chills, fatigue and fever.   HENT:  Negative for congestion, hearing loss and rhinorrhea.    Eyes:  Positive for visual disturbance.         Glasses   Respiratory:  Negative for cough and shortness of breath.    Cardiovascular:  Negative for chest pain, palpitations and leg swelling.   Gastrointestinal:  Negative for constipation, diarrhea, nausea and vomiting.   Genitourinary:  Negative for dysuria and frequency.   Musculoskeletal:  Positive for arthralgias and gait problem.        Utilizing wheelchair and rollator for mobility.    Neurological:  Positive for weakness. Negative for dizziness, light-headedness and headaches.   Psychiatric/Behavioral:  Negative for sleep disturbance. The patient is not nervous/anxious.        Objective  /60 (BP Location: Right arm, Patient Position: Sitting, BP Cuff Size: Adult)   Pulse 65   Resp 16   SpO2 98%     Physical Exam  Constitutional:       General: She is awake. She is not in acute distress.     Appearance: Normal appearance. She is not ill-appearing.   HENT:      Head: Normocephalic.      Nose: No congestion or rhinorrhea.      Mouth/Throat:      Mouth: Mucous membranes are moist.   Eyes:      Conjunctiva/sclera: Conjunctivae normal.      Pupils: Pupils are equal, round, and reactive to light.   Cardiovascular:      Rate and Rhythm: Normal rate and regular rhythm.      Pulses: Normal pulses.      Heart sounds: Normal heart sounds.   Pulmonary:      Effort: Pulmonary effort is normal. No respiratory distress.      Breath sounds: Normal breath sounds. No wheezing or rhonchi.   Abdominal:      General: Bowel sounds are normal.      Palpations: Abdomen is soft.   Musculoskeletal:         General: Normal range of motion.      Cervical back: Normal range of motion.      Right lower leg: No edema.      Left lower leg: No edema.   Skin:     General: Skin is warm.      Capillary Refill: Capillary refill takes less than 2 seconds.      Findings: No bruising.   Neurological:      General: No focal deficit present.      Mental Status: She is alert and oriented to person, place, and time. Mental status is at  baseline.   Psychiatric:         Attention and Perception: Attention normal.         Mood and Affect: Mood normal.         Speech: Speech normal.         Behavior: Behavior normal. Behavior is cooperative.         Thought Content: Thought content normal.         Cognition and Memory: Cognition and memory normal. Cognition is not impaired. Memory is not impaired.         Judgment: Judgment normal.         Assessment/Plan  Diagnoses and all orders for this visit:  Primary hypertension  -     atenolol (Tenormin) 25 mg tablet; Take 1 tablet (25 mg) by mouth once daily.  Hip dislocation, right, initial encounter (Multi)  Comments:  Denies pain. Unable to tolerate brace and is not wearing. Refer to PT/OT to eval and treat.  Living in assisted living  Comments:  Medical and facility chart reviewed, medication reconciled & collaboration w/ nursing.            Electronically Signed By: DESTINEE Bhatia   1/25/25  1:27 PM

## 2025-01-25 VITALS
RESPIRATION RATE: 16 BRPM | OXYGEN SATURATION: 98 % | DIASTOLIC BLOOD PRESSURE: 60 MMHG | SYSTOLIC BLOOD PRESSURE: 130 MMHG | HEART RATE: 65 BPM

## 2025-01-25 RX ORDER — ATENOLOL 25 MG/1
25 TABLET ORAL DAILY
Qty: 30 TABLET | Refills: 0
Start: 2025-01-25

## 2025-01-25 ASSESSMENT — ENCOUNTER SYMPTOMS
DYSURIA: 0
ARTHRALGIAS: 1
CONSTIPATION: 0
NAUSEA: 0
RHINORRHEA: 0
LIGHT-HEADEDNESS: 0
PALPITATIONS: 0
DIARRHEA: 0
SLEEP DISTURBANCE: 0
VOMITING: 0
APPETITE CHANGE: 0
COUGH: 0
CHILLS: 0
ACTIVITY CHANGE: 0
WEAKNESS: 1
FREQUENCY: 0
SHORTNESS OF BREATH: 0
FEVER: 0
FATIGUE: 0
HEADACHES: 0
NERVOUS/ANXIOUS: 0
DIZZINESS: 0

## 2025-01-25 NOTE — PROGRESS NOTES
"Subjective   Patient ID: Mariaelena Tavarez is a 89 y.o. female who is assisted living/ home patient being seen at [facility], and evaluated for follow up for ER visit.     ER Follow-up  Associated symptoms include arthralgias and weakness. Pertinent negatives include no chest pain, chills, congestion, coughing, fatigue, fever, headaches, nausea or vomiting.      HPI:   Pt visited in apartment of assisted living. Pt was sent to ER on 1/19 after \"bending over and feeling hip pop\" that resulted in a dislocated right hip. Pt was sedated and a reduction was performed, pt returned to AL same day. Pt given a brace to wear to right hip, but unable to tolerate and due to dementia is not compliant with keeping on. Three days later pt had a fall and went to ER, no significant findings and pt returned to AL. Pt denies pain, and states \"I don't have any pain, my hip doesn't hurt\". Pt recalls both events leading to ER. Pt denies fever, chills, headaches, and dizziness. Pt denies chest pain and sob at rest/exertion. Pt denies voiding symptoms and constipation. Pt ambulates with rollator. Collateral information obtained from nursing who reports patient's blood pressure has been low and feels may be contributing to falls. Blood pressure medication with parameters and has been held on occasion. TC with daughter Monica to update on visit, and who is in agreement with tx plan.     Current Outpatient Medications on File Prior to Visit   Medication Sig Dispense Refill    acetaminophen (Tylenol) 325 mg tablet Take 2 tablets (650 mg) by mouth every 4 hours if needed for mild pain (1 - 3), headaches or fever (temp greater than 38.0 C).      alum-mag hydroxide-simeth (Mylanta) 200-200-20 mg/5 mL oral suspension Take 30 mL by mouth every 4 hours if needed for indigestion or heartburn.      amLODIPine (Norvasc) 10 mg tablet Take 1 tablet (10 mg) by mouth once daily. (Patient taking differently: Take 1 tablet (10 mg) by mouth once daily. " Hold for SBP less than 120, and HR less than 60.) 90 tablet 3    aspirin 81 mg chewable tablet Chew 1 tablet (81 mg) once daily.      cholecalciferol (Vitamin D-3) 50 MCG (2000 UT) tablet Take 1 tablet (2,000 Units) by mouth once daily.      cranberry fruit (cranberry) 450 mg tablet Take 1 tablet by mouth 2 times a day. 90 tablet 3    d-mannose 500 mg capsule Take 1 capsule by mouth once daily. 30 capsule 3    donepezil (Aricept) 10 mg tablet TAKE ONE TABLET BY MOUTH DAILY AT BEDTIME AS DIRECTED 90 tablet 1    escitalopram (Lexapro) 5 mg tablet 1 tablet a day 90 tablet 3    gabapentin (Neurontin) 100 mg capsule Take 1 capsule (100 mg) by mouth 3 times a day. 90 capsule 5    levothyroxine (Synthroid, Levoxyl) 25 mcg tablet Take 1 tablet (25 mcg) by mouth once daily in the morning. Take before meals. . for 90 90 tablet 3    magnesium hydroxide (Milk of Magnesia) 400 mg/5 mL suspension Take 30 mL by mouth once daily as needed for constipation (If no BM in 3 days.).      pantoprazole (ProtoNix) 40 mg EC tablet Take 1 tablet (40 mg) by mouth once daily. 90 tablet 3    rosuvastatin (Crestor) 40 mg tablet Take 1 tablet (40 mg) by mouth once daily. 90 tablet 3    traMADol (Ultram) 50 mg tablet Take 1 tablet (50 mg) by mouth every 6 hours if needed for severe pain (7 - 10) for up to 10 doses. 10 tablet 0    [DISCONTINUED] atenolol (Tenormin) 25 mg tablet Take 1 tablet (25 mg) by mouth 2 times a day. 25 mg 2x a day (Patient taking differently: Take 1 tablet (25 mg) by mouth 2 times a day. Hold for SBP less than 120, and HR less than 60.) 180 tablet 3     No current facility-administered medications on file prior to visit.      Review of Systems   Constitutional:  Negative for activity change, appetite change, chills, fatigue and fever.   HENT:  Negative for congestion, hearing loss and rhinorrhea.    Eyes:  Positive for visual disturbance.        Glasses   Respiratory:  Negative for cough and shortness of breath.     Cardiovascular:  Negative for chest pain, palpitations and leg swelling.   Gastrointestinal:  Negative for constipation, diarrhea, nausea and vomiting.   Genitourinary:  Negative for dysuria and frequency.   Musculoskeletal:  Positive for arthralgias and gait problem.        Utilizing wheelchair and rollator for mobility.    Neurological:  Positive for weakness. Negative for dizziness, light-headedness and headaches.   Psychiatric/Behavioral:  Negative for sleep disturbance. The patient is not nervous/anxious.        Objective   /60 (BP Location: Right arm, Patient Position: Sitting, BP Cuff Size: Adult)   Pulse 65   Resp 16   SpO2 98%     Physical Exam  Constitutional:       General: She is awake. She is not in acute distress.     Appearance: Normal appearance. She is not ill-appearing.   HENT:      Head: Normocephalic.      Nose: No congestion or rhinorrhea.      Mouth/Throat:      Mouth: Mucous membranes are moist.   Eyes:      Conjunctiva/sclera: Conjunctivae normal.      Pupils: Pupils are equal, round, and reactive to light.   Cardiovascular:      Rate and Rhythm: Normal rate and regular rhythm.      Pulses: Normal pulses.      Heart sounds: Normal heart sounds.   Pulmonary:      Effort: Pulmonary effort is normal. No respiratory distress.      Breath sounds: Normal breath sounds. No wheezing or rhonchi.   Abdominal:      General: Bowel sounds are normal.      Palpations: Abdomen is soft.   Musculoskeletal:         General: Normal range of motion.      Cervical back: Normal range of motion.      Right lower leg: No edema.      Left lower leg: No edema.   Skin:     General: Skin is warm.      Capillary Refill: Capillary refill takes less than 2 seconds.      Findings: No bruising.   Neurological:      General: No focal deficit present.      Mental Status: She is alert and oriented to person, place, and time. Mental status is at baseline.   Psychiatric:         Attention and Perception: Attention  normal.         Mood and Affect: Mood normal.         Speech: Speech normal.         Behavior: Behavior normal. Behavior is cooperative.         Thought Content: Thought content normal.         Cognition and Memory: Cognition and memory normal. Cognition is not impaired. Memory is not impaired.         Judgment: Judgment normal.         Assessment/Plan   Diagnoses and all orders for this visit:  Primary hypertension  -     atenolol (Tenormin) 25 mg tablet; Take 1 tablet (25 mg) by mouth once daily.  Hip dislocation, right, initial encounter (Multi)  Comments:  Denies pain. Unable to tolerate brace and is not wearing. Refer to PT/OT to eval and treat.  Living in assisted living  Comments:  Medical and facility chart reviewed, medication reconciled & collaboration w/ nursing.

## 2025-01-28 ENCOUNTER — HOSPITAL ENCOUNTER (EMERGENCY)
Facility: HOSPITAL | Age: OVER 89
Discharge: HOME | End: 2025-01-29
Attending: STUDENT IN AN ORGANIZED HEALTH CARE EDUCATION/TRAINING PROGRAM
Payer: MEDICARE

## 2025-01-28 VITALS
HEIGHT: 60 IN | SYSTOLIC BLOOD PRESSURE: 119 MMHG | RESPIRATION RATE: 10 BRPM | WEIGHT: 97 LBS | OXYGEN SATURATION: 95 % | HEART RATE: 62 BPM | DIASTOLIC BLOOD PRESSURE: 57 MMHG | BODY MASS INDEX: 19.04 KG/M2 | TEMPERATURE: 97.8 F

## 2025-01-28 DIAGNOSIS — S20.212A CHEST WALL CONTUSION, LEFT, INITIAL ENCOUNTER: Primary | ICD-10-CM

## 2025-01-28 DIAGNOSIS — S70.11XA CONTUSION OF RIGHT THIGH, INITIAL ENCOUNTER: ICD-10-CM

## 2025-01-28 PROCEDURE — 99283 EMERGENCY DEPT VISIT LOW MDM: CPT

## 2025-01-28 PROCEDURE — 2500000001 HC RX 250 WO HCPCS SELF ADMINISTERED DRUGS (ALT 637 FOR MEDICARE OP): Performed by: STUDENT IN AN ORGANIZED HEALTH CARE EDUCATION/TRAINING PROGRAM

## 2025-01-28 RX ORDER — ACETAMINOPHEN 500 MG
500 TABLET ORAL ONCE
Status: COMPLETED | OUTPATIENT
Start: 2025-01-28 | End: 2025-01-28

## 2025-01-28 RX ADMIN — ACETAMINOPHEN 500 MG: 500 TABLET ORAL at 23:32

## 2025-01-28 ASSESSMENT — PAIN DESCRIPTION - LOCATION: LOCATION: RIB CAGE

## 2025-01-28 ASSESSMENT — PAIN DESCRIPTION - PAIN TYPE: TYPE: ACUTE PAIN

## 2025-01-28 ASSESSMENT — PAIN SCALES - GENERAL: PAINLEVEL_OUTOF10: 2

## 2025-01-28 ASSESSMENT — PAIN - FUNCTIONAL ASSESSMENT: PAIN_FUNCTIONAL_ASSESSMENT: 0-10

## 2025-01-28 ASSESSMENT — PAIN DESCRIPTION - ORIENTATION: ORIENTATION: LEFT
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
## 2025-01-29 NOTE — DISCHARGE INSTRUCTIONS
Thank you for allowing myself and the team to take care of you during your emergency situation and addressing your health concerns.    You were evaluated for chest wall trauma and proximal right thigh trauma.     Your likely condition/diagnosis: Chest wall bruising with small hematoma and mild bruising of proximal right thigh    During your visit in the emergency department you were evaluated for your presenting symptoms.  Based on the extensive workup you received,  all efforts were made to identify the source of your symptoms and identify any life-threatening conditions.  These life-threatening conditions that were attempted to be identified and medically managed/treated include but not limited to rib fracture, pneumothorax.  Additionally, you may be experiencing symptoms that are non-life-threatening but are uncomfortable and disruptive to your everyday life.  All efforts were made to manage your symptoms while in the emergency department along with appropriate at home therapy for symptomatic management during your recovery. Please be aware that not all of the conditions explained/discussed in these discharge instructions are applicable to your condition but encompass many of the conditions that were evaluated for during your ED encounter.      During your evaluation and assessment, all measures were taken to evaluate you and address your health concerns to identify dangerous and life threatening health conditions. It is not possible to identify all health conditions or pathologies and eliminate any chance of unfavorable outcomes while in the Emergency Department. My team encourages you to be vigilant with your health and follow-up with the appropriate providers outlined in your discharge paperwork. Please return to the Emergency Department if you feel that your health has not improved or experiencing worsening symptoms.    Special instructions please make a follow-up point with your primary care physician to  further manage symptoms and address health concerns.    Incidental findings:  None

## 2025-01-31 ENCOUNTER — NURSING HOME VISIT (OUTPATIENT)
Dept: POST ACUTE CARE | Facility: EXTERNAL LOCATION | Age: OVER 89
End: 2025-01-31
Payer: MEDICARE

## 2025-01-31 DIAGNOSIS — S22.32XA CLOSED FRACTURE OF ONE RIB OF LEFT SIDE, INITIAL ENCOUNTER: ICD-10-CM

## 2025-01-31 DIAGNOSIS — S20.211A CONTUSION OF RIGHT CHEST WALL, INITIAL ENCOUNTER: Primary | ICD-10-CM

## 2025-01-31 DIAGNOSIS — Z78.9 LIVING IN ASSISTED LIVING: ICD-10-CM

## 2025-01-31 DIAGNOSIS — S22.41XA CLOSED FRACTURE OF MULTIPLE RIBS OF RIGHT SIDE, INITIAL ENCOUNTER: ICD-10-CM

## 2025-01-31 PROCEDURE — 99349 HOME/RES VST EST MOD MDM 40: CPT

## 2025-01-31 RX ORDER — LIDOCAINE 560 MG/1
1 PATCH PERCUTANEOUS; TOPICAL; TRANSDERMAL DAILY
Qty: 15 PATCH | Refills: 3 | Status: SHIPPED | OUTPATIENT
Start: 2025-01-31

## 2025-01-31 RX ORDER — ACETAMINOPHEN 325 MG/1
650 TABLET ORAL 3 TIMES DAILY
Qty: 180 TABLET | Refills: 3 | Status: SHIPPED | OUTPATIENT
Start: 2025-01-31

## 2025-01-31 ASSESSMENT — PAIN SCALES - GENERAL: PAINLEVEL_OUTOF10: 0-NO PAIN
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
## 2025-01-31 NOTE — ASSESSMENT & PLAN NOTE
Nursing to monitor pulse ox and lung sounds every shift.   Nursing to monitor for s/s of respiratory distress, pneumothorax.   Tylenol 650mg ordered TID  Lidocaine patch to bilateral chest.   Pt to follow up with ortho at follow up appt for dislocated hip.
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
## 2025-02-03 VITALS
SYSTOLIC BLOOD PRESSURE: 124 MMHG | DIASTOLIC BLOOD PRESSURE: 61 MMHG | RESPIRATION RATE: 18 BRPM | HEART RATE: 75 BPM | OXYGEN SATURATION: 95 %

## 2025-02-03 ASSESSMENT — ENCOUNTER SYMPTOMS
SLEEP DISTURBANCE: 0
SHORTNESS OF BREATH: 0
NERVOUS/ANXIOUS: 0
LIGHT-HEADEDNESS: 0
ARTHRALGIAS: 1
NAUSEA: 0
FEVER: 0
RHINORRHEA: 0
WEAKNESS: 1
HEADACHES: 0
FATIGUE: 0
CONSTIPATION: 0
DIZZINESS: 0
ACTIVITY CHANGE: 0
DIARRHEA: 0
COUGH: 0
APPETITE CHANGE: 0
VOMITING: 0
FREQUENCY: 0
CHILLS: 0
DYSURIA: 0
PALPITATIONS: 0

## 2025-02-06 ENCOUNTER — TELEPHONE (OUTPATIENT)
Dept: POST ACUTE CARE | Facility: EXTERNAL LOCATION | Age: OVER 89
End: 2025-02-06
Payer: MEDICARE

## 2025-02-14 ENCOUNTER — TELEPHONE (OUTPATIENT)
Dept: PRIMARY CARE | Facility: CLINIC | Age: OVER 89
End: 2025-02-14
Payer: MEDICARE

## 2025-02-15 NOTE — TELEPHONE ENCOUNTER
Spoke with nurse Sophie at [facility] regarding positive urinalysis results for pt with positive nitrates, leukocytes and bilirubin. Will send for culture. Will start treatment Macrobid 100 mg po bid x 5 days.   Sophie verbalizes understanding.   Maureen Carlton, APRN-CNP

## 2025-03-11 ENCOUNTER — APPOINTMENT (OUTPATIENT)
Dept: CARDIOLOGY | Facility: HOSPITAL | Age: OVER 89
DRG: 193 | End: 2025-03-11
Payer: MEDICARE

## 2025-03-11 ENCOUNTER — HOSPITAL ENCOUNTER (INPATIENT)
Facility: HOSPITAL | Age: OVER 89
LOS: 3 days | Discharge: NURSING, REHAB OR LONG-TERM CARE | DRG: 193 | End: 2025-03-14
Attending: EMERGENCY MEDICINE | Admitting: STUDENT IN AN ORGANIZED HEALTH CARE EDUCATION/TRAINING PROGRAM
Payer: MEDICARE

## 2025-03-11 ENCOUNTER — APPOINTMENT (OUTPATIENT)
Dept: RADIOLOGY | Facility: HOSPITAL | Age: OVER 89
DRG: 193 | End: 2025-03-11
Payer: MEDICARE

## 2025-03-11 DIAGNOSIS — R09.02 HYPOXIA: ICD-10-CM

## 2025-03-11 DIAGNOSIS — J10.1 INFLUENZA A VIRUS PRESENT: Primary | ICD-10-CM

## 2025-03-11 DIAGNOSIS — R50.9 FEVER, UNSPECIFIED FEVER CAUSE: ICD-10-CM

## 2025-03-11 DIAGNOSIS — I10 PRIMARY HYPERTENSION: ICD-10-CM

## 2025-03-11 LAB
ALBUMIN SERPL BCP-MCNC: 4.4 G/DL (ref 3.4–5)
ALP SERPL-CCNC: 114 U/L (ref 33–136)
ALT SERPL W P-5'-P-CCNC: 22 U/L (ref 7–45)
ANION GAP SERPL CALCULATED.3IONS-SCNC: 14 MMOL/L (ref 10–20)
AST SERPL W P-5'-P-CCNC: 27 U/L (ref 9–39)
BILIRUB SERPL-MCNC: 0.9 MG/DL (ref 0–1.2)
BUN SERPL-MCNC: 14 MG/DL (ref 6–23)
CALCIUM SERPL-MCNC: 9.8 MG/DL (ref 8.6–10.3)
CHLORIDE SERPL-SCNC: 102 MMOL/L (ref 98–107)
CO2 SERPL-SCNC: 28 MMOL/L (ref 21–32)
CREAT SERPL-MCNC: 0.81 MG/DL (ref 0.5–1.05)
EGFRCR SERPLBLD CKD-EPI 2021: 69 ML/MIN/1.73M*2
ERYTHROCYTE [DISTWIDTH] IN BLOOD BY AUTOMATED COUNT: 13.6 % (ref 11.5–14.5)
FLUAV RNA RESP QL NAA+PROBE: DETECTED
FLUBV RNA RESP QL NAA+PROBE: NOT DETECTED
GLUCOSE SERPL-MCNC: 95 MG/DL (ref 74–99)
HCT VFR BLD AUTO: 40 % (ref 36–46)
HGB BLD-MCNC: 13 G/DL (ref 12–16)
MCH RBC QN AUTO: 30.8 PG (ref 26–34)
MCHC RBC AUTO-ENTMCNC: 32.5 G/DL (ref 32–36)
MCV RBC AUTO: 95 FL (ref 80–100)
NRBC BLD-RTO: 0 /100 WBCS (ref 0–0)
PLATELET # BLD AUTO: 197 X10*3/UL (ref 150–450)
POTASSIUM SERPL-SCNC: 3.5 MMOL/L (ref 3.5–5.3)
PROT SERPL-MCNC: 7.4 G/DL (ref 6.4–8.2)
RBC # BLD AUTO: 4.22 X10*6/UL (ref 4–5.2)
RSV RNA RESP QL NAA+PROBE: NOT DETECTED
SARS-COV-2 RNA RESP QL NAA+PROBE: NOT DETECTED
SODIUM SERPL-SCNC: 140 MMOL/L (ref 136–145)
WBC # BLD AUTO: 5.4 X10*3/UL (ref 4.4–11.3)

## 2025-03-11 PROCEDURE — 85027 COMPLETE CBC AUTOMATED: CPT | Performed by: NURSE PRACTITIONER

## 2025-03-11 PROCEDURE — 2500000002 HC RX 250 W HCPCS SELF ADMINISTERED DRUGS (ALT 637 FOR MEDICARE OP, ALT 636 FOR OP/ED): Performed by: EMERGENCY MEDICINE

## 2025-03-11 PROCEDURE — 71045 X-RAY EXAM CHEST 1 VIEW: CPT | Performed by: SURGERY

## 2025-03-11 PROCEDURE — 71045 X-RAY EXAM CHEST 1 VIEW: CPT

## 2025-03-11 PROCEDURE — 80053 COMPREHEN METABOLIC PANEL: CPT | Performed by: NURSE PRACTITIONER

## 2025-03-11 PROCEDURE — 93005 ELECTROCARDIOGRAM TRACING: CPT

## 2025-03-11 PROCEDURE — 99285 EMERGENCY DEPT VISIT HI MDM: CPT | Performed by: EMERGENCY MEDICINE

## 2025-03-11 PROCEDURE — 94640 AIRWAY INHALATION TREATMENT: CPT | Mod: 59

## 2025-03-11 PROCEDURE — 2500000001 HC RX 250 WO HCPCS SELF ADMINISTERED DRUGS (ALT 637 FOR MEDICARE OP): Performed by: EMERGENCY MEDICINE

## 2025-03-11 PROCEDURE — 36415 COLL VENOUS BLD VENIPUNCTURE: CPT | Performed by: NURSE PRACTITIONER

## 2025-03-11 PROCEDURE — 93010 ELECTROCARDIOGRAM REPORT: CPT | Performed by: INTERNAL MEDICINE

## 2025-03-11 PROCEDURE — 99223 1ST HOSP IP/OBS HIGH 75: CPT | Performed by: NURSE PRACTITIONER

## 2025-03-11 PROCEDURE — 2500000004 HC RX 250 GENERAL PHARMACY W/ HCPCS (ALT 636 FOR OP/ED): Performed by: NURSE PRACTITIONER

## 2025-03-11 PROCEDURE — 1200000002 HC GENERAL ROOM WITH TELEMETRY DAILY

## 2025-03-11 PROCEDURE — 87637 SARSCOV2&INF A&B&RSV AMP PRB: CPT | Performed by: EMERGENCY MEDICINE

## 2025-03-11 PROCEDURE — 2500000001 HC RX 250 WO HCPCS SELF ADMINISTERED DRUGS (ALT 637 FOR MEDICARE OP): Performed by: NURSE PRACTITIONER

## 2025-03-11 RX ORDER — ONDANSETRON 4 MG/1
4 TABLET, ORALLY DISINTEGRATING ORAL EVERY 8 HOURS PRN
Status: DISCONTINUED | OUTPATIENT
Start: 2025-03-11 | End: 2025-03-14 | Stop reason: HOSPADM

## 2025-03-11 RX ORDER — IPRATROPIUM BROMIDE AND ALBUTEROL SULFATE 2.5; .5 MG/3ML; MG/3ML
3 SOLUTION RESPIRATORY (INHALATION)
Status: DISCONTINUED | OUTPATIENT
Start: 2025-03-12 | End: 2025-03-14 | Stop reason: HOSPADM

## 2025-03-11 RX ORDER — IPRATROPIUM BROMIDE AND ALBUTEROL SULFATE 2.5; .5 MG/3ML; MG/3ML
3 SOLUTION RESPIRATORY (INHALATION) ONCE
Status: COMPLETED | OUTPATIENT
Start: 2025-03-11 | End: 2025-03-11

## 2025-03-11 RX ORDER — ROSUVASTATIN CALCIUM 20 MG/1
40 TABLET, COATED ORAL DAILY
Status: DISCONTINUED | OUTPATIENT
Start: 2025-03-12 | End: 2025-03-14 | Stop reason: HOSPADM

## 2025-03-11 RX ORDER — ACETAMINOPHEN 325 MG/1
650 TABLET ORAL EVERY 4 HOURS PRN
Status: DISCONTINUED | OUTPATIENT
Start: 2025-03-11 | End: 2025-03-14 | Stop reason: HOSPADM

## 2025-03-11 RX ORDER — POLYETHYLENE GLYCOL 3350 17 G/17G
17 POWDER, FOR SOLUTION ORAL DAILY PRN
Status: DISCONTINUED | OUTPATIENT
Start: 2025-03-11 | End: 2025-03-14 | Stop reason: HOSPADM

## 2025-03-11 RX ORDER — AMLODIPINE BESYLATE 10 MG/1
10 TABLET ORAL DAILY
Status: DISCONTINUED | OUTPATIENT
Start: 2025-03-12 | End: 2025-03-14 | Stop reason: HOSPADM

## 2025-03-11 RX ORDER — GUAIFENESIN 600 MG/1
600 TABLET, EXTENDED RELEASE ORAL 2 TIMES DAILY
Status: DISCONTINUED | OUTPATIENT
Start: 2025-03-11 | End: 2025-03-14 | Stop reason: HOSPADM

## 2025-03-11 RX ORDER — PANTOPRAZOLE SODIUM 40 MG/1
40 TABLET, DELAYED RELEASE ORAL DAILY
Status: DISCONTINUED | OUTPATIENT
Start: 2025-03-12 | End: 2025-03-14 | Stop reason: HOSPADM

## 2025-03-11 RX ORDER — BENZONATATE 100 MG/1
100 CAPSULE ORAL 3 TIMES DAILY PRN
Status: DISCONTINUED | OUTPATIENT
Start: 2025-03-11 | End: 2025-03-14 | Stop reason: HOSPADM

## 2025-03-11 RX ORDER — LEVOTHYROXINE SODIUM 25 UG/1
25 TABLET ORAL
Status: DISCONTINUED | OUTPATIENT
Start: 2025-03-12 | End: 2025-03-14 | Stop reason: HOSPADM

## 2025-03-11 RX ORDER — ESCITALOPRAM OXALATE 10 MG/1
5 TABLET ORAL DAILY
Status: DISCONTINUED | OUTPATIENT
Start: 2025-03-12 | End: 2025-03-12

## 2025-03-11 RX ORDER — IPRATROPIUM BROMIDE AND ALBUTEROL SULFATE 2.5; .5 MG/3ML; MG/3ML
3 SOLUTION RESPIRATORY (INHALATION) EVERY 2 HOUR PRN
Status: DISCONTINUED | OUTPATIENT
Start: 2025-03-11 | End: 2025-03-14 | Stop reason: HOSPADM

## 2025-03-11 RX ORDER — ONDANSETRON HYDROCHLORIDE 2 MG/ML
4 INJECTION, SOLUTION INTRAVENOUS EVERY 8 HOURS PRN
Status: DISCONTINUED | OUTPATIENT
Start: 2025-03-11 | End: 2025-03-14 | Stop reason: HOSPADM

## 2025-03-11 RX ORDER — ACETAMINOPHEN 325 MG/1
975 TABLET ORAL ONCE
Status: COMPLETED | OUTPATIENT
Start: 2025-03-11 | End: 2025-03-11

## 2025-03-11 RX ORDER — DONEPEZIL HYDROCHLORIDE 10 MG/1
10 TABLET, FILM COATED ORAL NIGHTLY
Status: DISCONTINUED | OUTPATIENT
Start: 2025-03-11 | End: 2025-03-14 | Stop reason: HOSPADM

## 2025-03-11 RX ORDER — NAPROXEN SODIUM 220 MG/1
81 TABLET, FILM COATED ORAL DAILY
Status: DISCONTINUED | OUTPATIENT
Start: 2025-03-12 | End: 2025-03-14 | Stop reason: HOSPADM

## 2025-03-11 RX ORDER — IPRATROPIUM BROMIDE AND ALBUTEROL SULFATE 2.5; .5 MG/3ML; MG/3ML
3 SOLUTION RESPIRATORY (INHALATION)
Status: DISCONTINUED | OUTPATIENT
Start: 2025-03-11 | End: 2025-03-11

## 2025-03-11 RX ORDER — ATENOLOL 25 MG/1
25 TABLET ORAL DAILY
Status: DISCONTINUED | OUTPATIENT
Start: 2025-03-12 | End: 2025-03-14 | Stop reason: HOSPADM

## 2025-03-11 RX ORDER — GABAPENTIN 100 MG/1
100 CAPSULE ORAL 3 TIMES DAILY
Status: DISCONTINUED | OUTPATIENT
Start: 2025-03-11 | End: 2025-03-14 | Stop reason: HOSPADM

## 2025-03-11 RX ADMIN — IPRATROPIUM BROMIDE AND ALBUTEROL SULFATE 3 ML: 2.5; .5 SOLUTION RESPIRATORY (INHALATION) at 19:35

## 2025-03-11 RX ADMIN — GABAPENTIN 100 MG: 100 CAPSULE ORAL at 22:07

## 2025-03-11 RX ADMIN — GUAIFENESIN 600 MG: 600 TABLET ORAL at 22:07

## 2025-03-11 RX ADMIN — DONEPEZIL HYDROCHLORIDE 10 MG: 10 TABLET, FILM COATED ORAL at 22:07

## 2025-03-11 RX ADMIN — ACETAMINOPHEN 975 MG: 325 TABLET ORAL at 19:35

## 2025-03-11 SDOH — HEALTH STABILITY: MENTAL HEALTH: HOW OFTEN DO YOU HAVE SIX OR MORE DRINKS ON ONE OCCASION?: NEVER

## 2025-03-11 SDOH — ECONOMIC STABILITY: INCOME INSECURITY: IN THE PAST 12 MONTHS HAS THE ELECTRIC, GAS, OIL, OR WATER COMPANY THREATENED TO SHUT OFF SERVICES IN YOUR HOME?: NO

## 2025-03-11 SDOH — SOCIAL STABILITY: SOCIAL INSECURITY: WITHIN THE LAST YEAR, HAVE YOU BEEN AFRAID OF YOUR PARTNER OR EX-PARTNER?: NO

## 2025-03-11 SDOH — SOCIAL STABILITY: SOCIAL INSECURITY: ABUSE: ADULT

## 2025-03-11 SDOH — SOCIAL STABILITY: SOCIAL INSECURITY: WERE YOU ABLE TO COMPLETE ALL THE BEHAVIORAL HEALTH SCREENINGS?: YES

## 2025-03-11 SDOH — HEALTH STABILITY: MENTAL HEALTH: HOW OFTEN DO YOU HAVE A DRINK CONTAINING ALCOHOL?: NEVER

## 2025-03-11 SDOH — ECONOMIC STABILITY: HOUSING INSECURITY: AT ANY TIME IN THE PAST 12 MONTHS, WERE YOU HOMELESS OR LIVING IN A SHELTER (INCLUDING NOW)?: NO

## 2025-03-11 SDOH — ECONOMIC STABILITY: FOOD INSECURITY: WITHIN THE PAST 12 MONTHS, THE FOOD YOU BOUGHT JUST DIDN'T LAST AND YOU DIDN'T HAVE MONEY TO GET MORE.: NEVER TRUE

## 2025-03-11 SDOH — SOCIAL STABILITY: SOCIAL INSECURITY: DO YOU FEEL UNSAFE GOING BACK TO THE PLACE WHERE YOU ARE LIVING?: NO

## 2025-03-11 SDOH — ECONOMIC STABILITY: FOOD INSECURITY: WITHIN THE PAST 12 MONTHS, YOU WORRIED THAT YOUR FOOD WOULD RUN OUT BEFORE YOU GOT THE MONEY TO BUY MORE.: NEVER TRUE

## 2025-03-11 SDOH — SOCIAL STABILITY: SOCIAL INSECURITY: WITHIN THE LAST YEAR, HAVE YOU BEEN HUMILIATED OR EMOTIONALLY ABUSED IN OTHER WAYS BY YOUR PARTNER OR EX-PARTNER?: NO

## 2025-03-11 SDOH — SOCIAL STABILITY: SOCIAL INSECURITY: ARE THERE ANY APPARENT SIGNS OF INJURIES/BEHAVIORS THAT COULD BE RELATED TO ABUSE/NEGLECT?: NO

## 2025-03-11 SDOH — ECONOMIC STABILITY: TRANSPORTATION INSECURITY: IN THE PAST 12 MONTHS, HAS LACK OF TRANSPORTATION KEPT YOU FROM MEDICAL APPOINTMENTS OR FROM GETTING MEDICATIONS?: NO

## 2025-03-11 SDOH — SOCIAL STABILITY: SOCIAL INSECURITY: HAVE YOU HAD THOUGHTS OF HARMING ANYONE ELSE?: NO

## 2025-03-11 SDOH — SOCIAL STABILITY: SOCIAL INSECURITY: HAS ANYONE EVER THREATENED TO HURT YOUR FAMILY OR YOUR PETS?: NO

## 2025-03-11 SDOH — ECONOMIC STABILITY: HOUSING INSECURITY: IN THE LAST 12 MONTHS, WAS THERE A TIME WHEN YOU WERE NOT ABLE TO PAY THE MORTGAGE OR RENT ON TIME?: NO

## 2025-03-11 SDOH — SOCIAL STABILITY: SOCIAL INSECURITY: ARE YOU OR HAVE YOU BEEN THREATENED OR ABUSED PHYSICALLY, EMOTIONALLY, OR SEXUALLY BY ANYONE?: NO

## 2025-03-11 SDOH — SOCIAL STABILITY: SOCIAL INSECURITY: DOES ANYONE TRY TO KEEP YOU FROM HAVING/CONTACTING OTHER FRIENDS OR DOING THINGS OUTSIDE YOUR HOME?: NO

## 2025-03-11 SDOH — ECONOMIC STABILITY: HOUSING INSECURITY: IN THE PAST 12 MONTHS, HOW MANY TIMES HAVE YOU MOVED WHERE YOU WERE LIVING?: 0

## 2025-03-11 SDOH — HEALTH STABILITY: MENTAL HEALTH: HOW MANY DRINKS CONTAINING ALCOHOL DO YOU HAVE ON A TYPICAL DAY WHEN YOU ARE DRINKING?: PATIENT DOES NOT DRINK

## 2025-03-11 SDOH — SOCIAL STABILITY: SOCIAL INSECURITY: DO YOU FEEL ANYONE HAS EXPLOITED OR TAKEN ADVANTAGE OF YOU FINANCIALLY OR OF YOUR PERSONAL PROPERTY?: NO

## 2025-03-11 SDOH — ECONOMIC STABILITY: FOOD INSECURITY: HOW HARD IS IT FOR YOU TO PAY FOR THE VERY BASICS LIKE FOOD, HOUSING, MEDICAL CARE, AND HEATING?: NOT HARD AT ALL

## 2025-03-11 ASSESSMENT — COGNITIVE AND FUNCTIONAL STATUS - GENERAL
WALKING IN HOSPITAL ROOM: A LOT
MOVING TO AND FROM BED TO CHAIR: A LITTLE
MOBILITY SCORE: 16
TURNING FROM BACK TO SIDE WHILE IN FLAT BAD: A LITTLE
DAILY ACTIVITIY SCORE: 16
PERSONAL GROOMING: A LITTLE
HELP NEEDED FOR BATHING: A LITTLE
TOILETING: TOTAL
DRESSING REGULAR UPPER BODY CLOTHING: A LITTLE
PATIENT BASELINE BEDBOUND: UNABLE TO ASSESS AT THIS TIME
DRESSING REGULAR LOWER BODY CLOTHING: A LITTLE
STANDING UP FROM CHAIR USING ARMS: A LITTLE
EATING MEALS: A LITTLE
CLIMB 3 TO 5 STEPS WITH RAILING: TOTAL

## 2025-03-11 ASSESSMENT — LIFESTYLE VARIABLES
AUDIT-C TOTAL SCORE: 0
SKIP TO QUESTIONS 9-10: 1

## 2025-03-11 ASSESSMENT — PAIN - FUNCTIONAL ASSESSMENT: PAIN_FUNCTIONAL_ASSESSMENT: 0-10

## 2025-03-11 ASSESSMENT — PATIENT HEALTH QUESTIONNAIRE - PHQ9
1. LITTLE INTEREST OR PLEASURE IN DOING THINGS: NOT AT ALL
SUM OF ALL RESPONSES TO PHQ9 QUESTIONS 1 & 2: 0
2. FEELING DOWN, DEPRESSED OR HOPELESS: NOT AT ALL

## 2025-03-11 ASSESSMENT — ACTIVITIES OF DAILY LIVING (ADL)
HEARING - LEFT EAR: FUNCTIONAL
PATIENT'S MEMORY ADEQUATE TO SAFELY COMPLETE DAILY ACTIVITIES?: NO
JUDGMENT_ADEQUATE_SAFELY_COMPLETE_DAILY_ACTIVITIES: YES
GROOMING: NEEDS ASSISTANCE
LACK_OF_TRANSPORTATION: NO
FEEDING YOURSELF: NEEDS ASSISTANCE
LACK_OF_TRANSPORTATION: NO
HEARING - RIGHT EAR: FUNCTIONAL
DRESSING YOURSELF: NEEDS ASSISTANCE
BATHING: NEEDS ASSISTANCE
ASSISTIVE_DEVICE: WALKER
ADEQUATE_TO_COMPLETE_ADL: YES
WALKS IN HOME: NEEDS ASSISTANCE
TOILETING: NEEDS ASSISTANCE

## 2025-03-11 ASSESSMENT — PAIN SCALES - GENERAL: PAINLEVEL_OUTOF10: 0 - NO PAIN

## 2025-03-11 NOTE — ED PROVIDER NOTES
EMERGENCY DEPARTMENT ENCOUNTER      Pt Name: Mariaelena Tavarez  MRN: [id number]  Birthdate 1935  Date of evaluation: 3/11/2025  ED Provider: Scott Gonzales DO     CHIEF COMPLAINT       Chief Complaint   Patient presents with    Fever     Patient presents with low grade fever, confusion, cough , and lethargy. Patient from memory care unit from [facility]. Staff said symptoms started today.          HISTORY OF PRESENT ILLNESS   (Location/Symptom, Timing/Onset, Context/Setting, Quality, Duration, Modifying Factors, Severity) Note limiting factors.       HPI    Mariaelena Tavarez is a 89 y.o. who presents to the emergency department patient awake in bed, presents from care facility due to fever hypoxia.  Multiple and visual 7-second facility further history limited as patient is a poor historian currently in the memory care unit    Nursing Notes were reviewed.    History obtained from :  patient    Outside records reviewed: N/A      History/Collateral information obtained from: N/A     Chronic conditions affecting care: Dementia      Patient's PDMP reviewed personally    ED Course as of 03/11/25 2057   Tue Mar 11, 2025   2035 Due to patient's new oxygen requirement, will proceed with admission [SL]   2043 Consult Dr. Lacey spoke to the admitting medical service about the patient's clinical workup as well as the  agrees to admission to their service at this time. Accepting physician will continue the medical evaluation/workup and treatment plan upon admission and add additional testing, interventions/treatment as necessary [SL]      ED Course User Index  [SL] Scott Gonzales DO         Diagnoses as of 03/11/25 2057   Influenza A virus present   Fever, unspecified fever cause   Hypoxia        Discussion/ MDM:      All results/imaging obtained reviewed and interpreted, results trended/compared with previous levels if available to evaluate for abnormality contributing to today´s presentation  After reviewing patient´s  comorbidities, severity of history of presenting illness, labs andimaging if obtained in conjunction with physical exam and course in emergency department, deemed to have potential for deterioration/progression of symptoms that could lead to multiple morbidities or mortality, decision made that patient requiresfurther observation/evaluation/treatment and patient admitted to appropriate service,patient/family understand and agree with plan.     Chart created with voice recognition software, errors may be present due to software´sinterpretation        REVIEW OF SYSTEMS     Review of Systems  Pertinent positives and negatives as per HPI    PAST MEDICAL HISTORY     Past Medical History:   Diagnosis Date    Age-related nuclear cataract of left eye 09/03/2023    Anxiety 09/03/2023    Benign paroxysmal positional vertigo 09/03/2023    Coronary artery disease 09/03/2023    Gastroesophageal reflux disease 09/03/2023    Hip dislocation, right, initial encounter (Multi) 08/23/2024    Hyperlipidemia 09/03/2023    Osteoarthritis 09/03/2023    Unspecified cataract     Cataract of right eye, unspecified cataract type    Urinary incontinence 09/03/2023    Venous thrombosis 09/03/2023    Vitamin D deficiency 09/03/2023       SURGICAL HISTORY       Past Surgical History:   Procedure Laterality Date    CATARACT EXTRACTION  09/04/2018    Cataract Surgery    CT GUIDED IMAGING FOR NEEDLE PLACEMENT  9/27/2016    CT GUIDED IMAGING FOR NEEDLE PLACEMENT [facility]       CURRENT MEDICATIONS       Previous Medications    ACETAMINOPHEN (TYLENOL) 325 MG TABLET    Take 2 tablets (650 mg) by mouth every 4 hours if needed for mild pain (1 - 3), headaches or fever (temp greater than 38.0 C).    ACETAMINOPHEN (TYLENOL) 325 MG TABLET    Take 2 tablets (650 mg) by mouth 3 times a day.    ALUM-MAG HYDROXIDE-SIMETH (MYLANTA) 200-200-20 MG/5 ML ORAL SUSPENSION    Take 30 mL by mouth every 4 hours if needed for indigestion or heartburn.     AMLODIPINE (NORVASC) 10 MG TABLET    Take 1 tablet (10 mg) by mouth once daily.    ASPIRIN 81 MG CHEWABLE TABLET    Chew 1 tablet (81 mg) once daily.    ATENOLOL (TENORMIN) 25 MG TABLET    Take 1 tablet (25 mg) by mouth once daily.    CHOLECALCIFEROL (VITAMIN D-3) 50 MCG (2000 UT) TABLET    Take 1 tablet (2,000 Units) by mouth once daily.    CRANBERRY FRUIT (CRANBERRY) 450 MG TABLET    Take 1 tablet by mouth 2 times a day.    D-MANNOSE 500 MG CAPSULE    Take 1 capsule by mouth once daily.    DONEPEZIL (ARICEPT) 10 MG TABLET    TAKE ONE TABLET BY MOUTH DAILY AT BEDTIME AS DIRECTED    ESCITALOPRAM (LEXAPRO) 5 MG TABLET    1 tablet a day    GABAPENTIN (NEURONTIN) 100 MG CAPSULE    Take 1 capsule (100 mg) by mouth 3 times a day.    LEVOTHYROXINE (SYNTHROID, LEVOXYL) 25 MCG TABLET    Take 1 tablet (25 mcg) by mouth once daily in the morning. Take before meals. . for 90    LIDOCAINE (SALONPAS, LIDOCAINE,) 4 % PATCH    Place 1 patch over 12 hours on the skin once daily. Remove & discard patch within 12 hours or as directed by MD. Apply one patch to both sides of anterior chest, and remove at bedtime.    MAGNESIUM HYDROXIDE (MILK OF MAGNESIA) 400 MG/5 ML SUSPENSION    Take 30 mL by mouth once daily as needed for constipation (If no BM in 3 days.).    PANTOPRAZOLE (PROTONIX) 40 MG EC TABLET    Take 1 tablet (40 mg) by mouth once daily.    ROSUVASTATIN (CRESTOR) 40 MG TABLET    Take 1 tablet (40 mg) by mouth once daily.    TRAMADOL (ULTRAM) 50 MG TABLET    Take 1 tablet (50 mg) by mouth every 6 hours if needed for severe pain (7 - 10) for up to 10 doses.       ALLERGIES     Simvastatin, Iodinated contrast media, Lisinopril, and Sulfa (sulfonamide antibiotics)    FAMILY HISTORY       Family History   Problem Relation Name Age of Onset    Hypertension Mother      Stroke Mother      Arthritis Father      Parkinsonism Sister      Dementia Sister      Cancer Maternal Grandfather      Heart disease Other [facility]     Hypertension Other  ukn     Stroke Other [facility]     Hypertension Sibling      Stroke Sibling          SOCIAL HISTORY       Social History     Socioeconomic History    Marital status:    Tobacco Use    Smoking status: Never    Smokeless tobacco: Never   Substance and Sexual Activity    Alcohol use: Never    Drug use: Never     Social Drivers of Health     Financial Resource Strain: Not at Risk (10/20/2024)    Received from [employer]    Financial Resource Strain     Financial Resource Strain: 1   Food Insecurity: Not on File (10/8/2024)    Received from [employer]    Food Insecurity     Food: 0   Transportation Needs: Not at Risk (10/20/2024)    Received from [employer]    Transportation Needs     Transportation: 1   Physical Activity: Not on File (10/8/2024)    Received from [employer]    Physical Activity     Physical Activity: 0   Stress: Not at Risk (10/20/2024)    Received from [employer]    Stress     Stress: 1   Social Connections: Not at Risk (10/20/2024)    Received from [employer]    Social Connections     Connectedness: 1   Intimate Partner Violence: Not At Risk (2024)    Humiliation, Afraid, Rape, and Kick questionnaire     Fear of Current or Ex-Partner: No     Emotionally Abused: No     Physically Abused: No     Sexually Abused: No   Housing Stability: Not at Risk (10/20/2024)    Received from [employer]    Housing Stability     Housin       PHYSICAL EXAM       ED Triage Vitals [25]   Temperature Heart Rate Respirations BP   37.7 °C (99.8 °F) (!) 114 15 154/81      Pulse Ox Temp Source Heart Rate Source Patient Position   (!) 88 % Oral Monitor Sitting      BP Location FiO2 (%)     Left arm --         Physical Exam  PHYSICIAL EXAM: Vitals reviewed      GENERAL: Nontoxic appearing in room, The patient appears nourished and normally developed. Vital signs as documented.      EYES:   Head exam is unremarkable. No scleral icterus      HEENT:  Mucous membranes moist. Nares patent without copious rhinorrhea. , controlling secretions well       LUNGS:   + expiratory wheezing in bilateral lung fields with mild decreased chest excursion, + mild increased work of breathing without any respiratory distress .      CARDIAC:   Rhythm is regular. No dysrhythmias      EXTREMITIES:   No peripheral edema, with no obvious deformities. No asymmetric swelling bilateral lower extremities, no calf pain to palpation, Well-perfused no cyanosis no clinical exam findings concerning for deep venous thrombosis      SKIN:   Good color, with no significant rashes.  No pallor.     NEURO:  No obvious neurological deficits, patient able to ambulate.     PSYCH: Mood and affect normal.  Appropriate for age.    DIAGNOSTIC RESULTS     RADIOLOGY (Per Emergency Physician):     Interpretation per the Radiologist below, if available at the time of this note:  XR chest 1 view   Final Result   1.  No evidence of acute cardiopulmonary process.                  MACRO:   None        Signed by: Abdoulaye Uribe 3/11/2025 8:22 PM   Dictation workstation:   [id number]            LABS:  Labs Reviewed   SARS-COV-2 AND INFLUENZA A/B PCR - Abnormal       Result Value    Flu A Result Detected (*)     Flu B Result Not Detected      Coronavirus 2019, PCR Not Detected      Narrative:     This assay is an FDA-cleared, in vitro diagnostic nucleic acid amplification test for the qualitative detection and differentiation of SARS CoV-2/ Influenza A/B from nasopharyngeal specimens collected from individuals with signs and symptoms of respiratory tract infections, and has been validated for use at [facility]. Negative results do not preclude COVID-19/ Influenza A/B infections and should not be used as the sole basis for diagnosis, treatment, or other management decisions. Testing for SARS CoV-2 is recommended only for patients who meet current clinical and/or epidemiological criteria defined by federal, state, or local public health directives.   RSV PCR - Normal    RSV PCR Not Detected       Narrative:     This assay is an FDA-cleared, in vitro diagnostic nucleic acid amplification test for the detection of RSV from nasopharyngeal specimens, and has been validated for use at [facility]. Negative results do not preclude RSV infections, and should not be used as the sole basis for diagnosis, treatment, or other management decisions. If Influenza A/B and RSV PCR results are negative, testing for Parainfluenza virus, Adenovirus and Metapneumovirus is routinely performed for pediatric oncology and intensive care inpatients at [facility], and is available on other patients by placing an add-on request.           All other labs were within normal range or not returned as of this dictation.    EMERGENCY DEPARTMENT COURSE :   Vitals:    Vitals:    03/11/25 1922   BP: 154/81   BP Location: Left arm   Patient Position: Sitting   Pulse: (!) 114   Resp: 15   Temp: 37.7 °C (99.8 °F)   TempSrc: Oral   SpO2: (!) 88%   Weight: (!) 43.6 kg (96 lb 1.9 oz)   Height: 1.524 m (5')       Medications   ipratropium-albuteroL (Duo-Neb) 0.5-2.5 mg/3 mL nebulizer solution 3 mL (3 mL nebulization Given 3/1935)   acetaminophen (Tylenol) tablet 975 mg (975 mg oral Given 3/1935)             PROCEDURES:  Unless otherwise noted below, none     Procedures    CRITICAL CARE TIME       FINAL IMPRESSION      1. Influenza A virus present    2. Fever, unspecified fever cause    3. Hypoxia          DISPOSITION    Admit 03/11/2025 08:45:33 PM    PATIENT REFERRED TO:  No follow-up provider specified.    DISCHARGE MEDICATIONS:  New Prescriptions    No medications on file          (Comment: Please note this report has been produced using speech recognition software and may contain errors related to that system including errors in grammar, punctuation, and spelling, as well as words and phrases that may be inappropriate.  If there are any questions or concerns please feel free to contact the dictating provider for  clarification.)    Scott Gonzales DO (electronically signed)  Emergency Medicine Provider     Scott Gonzales DO  03/11/25 2057

## 2025-03-12 LAB
ANION GAP SERPL CALCULATED.3IONS-SCNC: 10 MMOL/L (ref 10–20)
APPEARANCE UR: ABNORMAL
ATRIAL RATE: 100 BPM
BACTERIA #/AREA URNS AUTO: ABNORMAL /HPF
BILIRUB UR STRIP.AUTO-MCNC: NEGATIVE MG/DL
BUN SERPL-MCNC: 14 MG/DL (ref 6–23)
CALCIUM SERPL-MCNC: 9.4 MG/DL (ref 8.6–10.3)
CHLORIDE SERPL-SCNC: 103 MMOL/L (ref 98–107)
CO2 SERPL-SCNC: 27 MMOL/L (ref 21–32)
COLOR UR: ABNORMAL
CREAT SERPL-MCNC: 0.74 MG/DL (ref 0.5–1.05)
EGFRCR SERPLBLD CKD-EPI 2021: 77 ML/MIN/1.73M*2
ERYTHROCYTE [DISTWIDTH] IN BLOOD BY AUTOMATED COUNT: 13.7 % (ref 11.5–14.5)
GLUCOSE SERPL-MCNC: 93 MG/DL (ref 74–99)
GLUCOSE UR STRIP.AUTO-MCNC: NORMAL MG/DL
HCT VFR BLD AUTO: 37.8 % (ref 36–46)
HGB BLD-MCNC: 11.8 G/DL (ref 12–16)
KETONES UR STRIP.AUTO-MCNC: NEGATIVE MG/DL
LEUKOCYTE ESTERASE UR QL STRIP.AUTO: NEGATIVE
MCH RBC QN AUTO: 29.9 PG (ref 26–34)
MCHC RBC AUTO-ENTMCNC: 31.2 G/DL (ref 32–36)
MCV RBC AUTO: 96 FL (ref 80–100)
NITRITE UR QL STRIP.AUTO: NEGATIVE
NRBC BLD-RTO: 0 /100 WBCS (ref 0–0)
P AXIS: 77 DEGREES
P OFFSET: 190 MS
P ONSET: 152 MS
PH UR STRIP.AUTO: 5.5 [PH]
PLATELET # BLD AUTO: 158 X10*3/UL (ref 150–450)
POTASSIUM SERPL-SCNC: 3.4 MMOL/L (ref 3.5–5.3)
PR INTERVAL: 138 MS
PROT UR STRIP.AUTO-MCNC: ABNORMAL MG/DL
Q ONSET: 221 MS
QRS COUNT: 16 BEATS
QRS DURATION: 114 MS
QT INTERVAL: 372 MS
QTC CALCULATION(BAZETT): 479 MS
QTC FREDERICIA: 441 MS
R AXIS: -49 DEGREES
RBC # BLD AUTO: 3.95 X10*6/UL (ref 4–5.2)
RBC # UR STRIP.AUTO: ABNORMAL MG/DL
RBC #/AREA URNS AUTO: ABNORMAL /HPF
SODIUM SERPL-SCNC: 137 MMOL/L (ref 136–145)
SP GR UR STRIP.AUTO: 1.01
SQUAMOUS #/AREA URNS AUTO: ABNORMAL /HPF
T AXIS: 39 DEGREES
T OFFSET: 407 MS
UROBILINOGEN UR STRIP.AUTO-MCNC: NORMAL MG/DL
VENTRICULAR RATE: 100 BPM
WBC # BLD AUTO: 4.6 X10*3/UL (ref 4.4–11.3)
WBC #/AREA URNS AUTO: ABNORMAL /HPF

## 2025-03-12 PROCEDURE — 2500000002 HC RX 250 W HCPCS SELF ADMINISTERED DRUGS (ALT 637 FOR MEDICARE OP, ALT 636 FOR OP/ED): Performed by: NURSE PRACTITIONER

## 2025-03-12 PROCEDURE — 97161 PT EVAL LOW COMPLEX 20 MIN: CPT | Mod: GP

## 2025-03-12 PROCEDURE — 2500000001 HC RX 250 WO HCPCS SELF ADMINISTERED DRUGS (ALT 637 FOR MEDICARE OP): Performed by: NURSE PRACTITIONER

## 2025-03-12 PROCEDURE — 2500000004 HC RX 250 GENERAL PHARMACY W/ HCPCS (ALT 636 FOR OP/ED): Performed by: NURSE PRACTITIONER

## 2025-03-12 PROCEDURE — 1200000002 HC GENERAL ROOM WITH TELEMETRY DAILY

## 2025-03-12 PROCEDURE — 97535 SELF CARE MNGMENT TRAINING: CPT | Mod: GO

## 2025-03-12 PROCEDURE — 97165 OT EVAL LOW COMPLEX 30 MIN: CPT | Mod: GO

## 2025-03-12 PROCEDURE — 85027 COMPLETE CBC AUTOMATED: CPT | Performed by: NURSE PRACTITIONER

## 2025-03-12 PROCEDURE — 36415 COLL VENOUS BLD VENIPUNCTURE: CPT | Performed by: NURSE PRACTITIONER

## 2025-03-12 PROCEDURE — 94640 AIRWAY INHALATION TREATMENT: CPT

## 2025-03-12 PROCEDURE — 99232 SBSQ HOSP IP/OBS MODERATE 35: CPT | Performed by: NURSE PRACTITIONER

## 2025-03-12 PROCEDURE — 80048 BASIC METABOLIC PNL TOTAL CA: CPT | Performed by: NURSE PRACTITIONER

## 2025-03-12 PROCEDURE — 81001 URINALYSIS AUTO W/SCOPE: CPT | Performed by: NURSE PRACTITIONER

## 2025-03-12 PROCEDURE — 2500000002 HC RX 250 W HCPCS SELF ADMINISTERED DRUGS (ALT 637 FOR MEDICARE OP, ALT 636 FOR OP/ED): Performed by: STUDENT IN AN ORGANIZED HEALTH CARE EDUCATION/TRAINING PROGRAM

## 2025-03-12 RX ORDER — OSELTAMIVIR PHOSPHATE 75 MG/1
75 CAPSULE ORAL ONCE
Status: COMPLETED | OUTPATIENT
Start: 2025-03-12 | End: 2025-03-12

## 2025-03-12 RX ORDER — OSELTAMIVIR PHOSPHATE 30 MG/1
30 CAPSULE ORAL 2 TIMES DAILY
Status: DISCONTINUED | OUTPATIENT
Start: 2025-03-12 | End: 2025-03-14 | Stop reason: HOSPADM

## 2025-03-12 RX ORDER — CITALOPRAM 10 MG/1
10 TABLET ORAL DAILY
Status: DISCONTINUED | OUTPATIENT
Start: 2025-03-12 | End: 2025-03-14 | Stop reason: HOSPADM

## 2025-03-12 RX ORDER — OSELTAMIVIR PHOSPHATE 30 MG/1
30 CAPSULE ORAL 2 TIMES DAILY
Status: DISCONTINUED | OUTPATIENT
Start: 2025-03-12 | End: 2025-03-12

## 2025-03-12 RX ADMIN — CITALOPRAM HYDROBROMIDE 10 MG: 10 TABLET ORAL at 09:17

## 2025-03-12 RX ADMIN — LEVOTHYROXINE SODIUM 25 MCG: 0.03 TABLET ORAL at 05:19

## 2025-03-12 RX ADMIN — GABAPENTIN 100 MG: 100 CAPSULE ORAL at 14:02

## 2025-03-12 RX ADMIN — PANTOPRAZOLE SODIUM 40 MG: 40 TABLET, DELAYED RELEASE ORAL at 08:40

## 2025-03-12 RX ADMIN — IPRATROPIUM BROMIDE AND ALBUTEROL SULFATE 3 ML: 2.5; .5 SOLUTION RESPIRATORY (INHALATION) at 13:42

## 2025-03-12 RX ADMIN — OSELTAMAVIR PHOSPHATE 30 MG: 30 CAPSULE ORAL at 21:22

## 2025-03-12 RX ADMIN — ROSUVASTATIN 40 MG: 20 TABLET, FILM COATED ORAL at 08:40

## 2025-03-12 RX ADMIN — ASPIRIN 81 MG CHEWABLE TABLET 81 MG: 81 TABLET CHEWABLE at 08:40

## 2025-03-12 RX ADMIN — IPRATROPIUM BROMIDE AND ALBUTEROL SULFATE 3 ML: 2.5; .5 SOLUTION RESPIRATORY (INHALATION) at 07:51

## 2025-03-12 RX ADMIN — ATENOLOL 25 MG: 25 TABLET ORAL at 08:40

## 2025-03-12 RX ADMIN — AMLODIPINE BESYLATE 10 MG: 10 TABLET ORAL at 08:40

## 2025-03-12 RX ADMIN — GUAIFENESIN 600 MG: 600 TABLET ORAL at 08:40

## 2025-03-12 RX ADMIN — GUAIFENESIN 600 MG: 600 TABLET ORAL at 21:22

## 2025-03-12 RX ADMIN — GABAPENTIN 100 MG: 100 CAPSULE ORAL at 08:40

## 2025-03-12 RX ADMIN — ACETAMINOPHEN 650 MG: 325 TABLET ORAL at 14:14

## 2025-03-12 RX ADMIN — ACETAMINOPHEN 650 MG: 325 TABLET ORAL at 21:35

## 2025-03-12 RX ADMIN — GABAPENTIN 100 MG: 100 CAPSULE ORAL at 21:22

## 2025-03-12 RX ADMIN — OSELTAMIVIR PHOSPHATE 75 MG: 75 CAPSULE ORAL at 11:06

## 2025-03-12 RX ADMIN — DONEPEZIL HYDROCHLORIDE 10 MG: 10 TABLET, FILM COATED ORAL at 21:22

## 2025-03-12 ASSESSMENT — COGNITIVE AND FUNCTIONAL STATUS - GENERAL
MOVING FROM LYING ON BACK TO SITTING ON SIDE OF FLAT BED WITH BEDRAILS: A LOT
TOILETING: TOTAL
TURNING FROM BACK TO SIDE WHILE IN FLAT BAD: A LOT
EATING MEALS: A LITTLE
HELP NEEDED FOR BATHING: A LOT
DAILY ACTIVITIY SCORE: 11
DRESSING REGULAR LOWER BODY CLOTHING: TOTAL
STANDING UP FROM CHAIR USING ARMS: A LOT
MOVING TO AND FROM BED TO CHAIR: A LOT
WALKING IN HOSPITAL ROOM: A LOT
MOBILITY SCORE: 11
DRESSING REGULAR UPPER BODY CLOTHING: A LOT
CLIMB 3 TO 5 STEPS WITH RAILING: TOTAL
PERSONAL GROOMING: A LOT

## 2025-03-12 ASSESSMENT — ACTIVITIES OF DAILY LIVING (ADL)
BATHING_ASSISTANCE: MAXIMAL
HOME_MANAGEMENT_TIME_ENTRY: 15

## 2025-03-12 ASSESSMENT — PAIN SCALES - GENERAL
PAINLEVEL_OUTOF10: 0 - NO PAIN

## 2025-03-12 ASSESSMENT — PAIN - FUNCTIONAL ASSESSMENT
PAIN_FUNCTIONAL_ASSESSMENT: 0-10
PAIN_FUNCTIONAL_ASSESSMENT: FLACC (FACE, LEGS, ACTIVITY, CRY, CONSOLABILITY)
PAIN_FUNCTIONAL_ASSESSMENT: FLACC (FACE, LEGS, ACTIVITY, CRY, CONSOLABILITY)

## 2025-03-12 ASSESSMENT — PAIN SCALES - WONG BAKER: WONGBAKER_NUMERICALRESPONSE: NO HURT

## 2025-03-12 NOTE — H&P
History Of Present Illness  Mariaelena Tavarez is a 89 y.o. female presenting with fever. Patient oriented to person only and is poor historian in the setting of illness and dementia. Daughter at bedside. She states her mom was fine when she visited yesterday. Today, she received call that her temperature was 104 and she was being sent to the ER. States patient had chills and rigors earlier, but is back to baseline with minor tremor at this time. Patient not able to review symptoms with me, but is able to converse with her daughter at times.      Past Medical History  Past Medical History:   Diagnosis Date    Age-related nuclear cataract of left eye 09/03/2023    Anxiety 09/03/2023    Benign paroxysmal positional vertigo 09/03/2023    Coronary artery disease 09/03/2023    Gastroesophageal reflux disease 09/03/2023    Hip dislocation, right, initial encounter ([facility]) 08/23/2024    Hyperlipidemia 09/03/2023    Osteoarthritis 09/03/2023    Unspecified cataract     Cataract of right eye, unspecified cataract type    Urinary incontinence 09/03/2023    Venous thrombosis 09/03/2023    Vitamin D deficiency 09/03/2023       Surgical History  Past Surgical History:   Procedure Laterality Date    CATARACT EXTRACTION  09/04/2018    Cataract Surgery    CT GUIDED IMAGING FOR NEEDLE PLACEMENT  9/27/2016    CT GUIDED IMAGING FOR NEEDLE PLACEMENT [facility]        Social History  She reports that she has never smoked. She has never used smokeless tobacco. She reports that she does not drink alcohol and does not use drugs.    Family History  Family History   Problem Relation Name Age of Onset    Hypertension Mother      Stroke Mother      Arthritis Father      Parkinsonism Sister      Dementia Sister      Cancer Maternal Grandfather      Heart disease Other [facility]     Hypertension Other [facility]     Stroke Other [facility]     Hypertension Sibling      Stroke Sibling          Allergies  Simvastatin, Iodinated contrast media, Lisinopril, and  Sulfa (sulfonamide antibiotics)    Review of Systems   Unable to perform ROS: Dementia        Physical Exam  Constitutional:       General: She is awake.      Appearance: She is ill-appearing.   HENT:      Head: Normocephalic and atraumatic.      Mouth/Throat:      Mouth: Mucous membranes are moist.      Pharynx: Oropharynx is clear.   Eyes:      Extraocular Movements: Extraocular movements intact.      Conjunctiva/sclera: Conjunctivae normal.      Pupils: Pupils are equal, round, and reactive to light.   Cardiovascular:      Rate and Rhythm: Normal rate and regular rhythm.      Pulses: Normal pulses.      Heart sounds: Normal heart sounds.   Pulmonary:      Effort: Pulmonary effort is normal.      Breath sounds: Normal breath sounds.   Abdominal:      General: Bowel sounds are normal.      Palpations: Abdomen is soft.      Tenderness: There is no abdominal tenderness.   Musculoskeletal:         General: Normal range of motion.      Cervical back: Normal range of motion and neck supple.   Skin:     General: Skin is warm and dry.      Capillary Refill: Capillary refill takes less than 2 seconds.   Neurological:      General: No focal deficit present.      Mental Status: She is lethargic and disoriented.   Psychiatric:         Mood and Affect: Mood normal.         Behavior: Behavior normal.          Last Recorded Vitals  Blood pressure 150/68, pulse 87, temperature 36.9 °C (98.4 °F), temperature source Oral, resp. rate 19, height 1.524 m (5'), weight (!) 43.6 kg (96 lb 1.9 oz), SpO2 97%.    Relevant Results  Results for orders placed or performed during the hospital encounter of 03/11/25 (from the past 24 hours)   Sars-CoV-2 and Influenza A/B PCR   Result Value Ref Range    Flu A Result Detected (A) Not Detected    Flu B Result Not Detected Not Detected    Coronavirus 2019, PCR Not Detected Not Detected   RSV PCR   Result Value Ref Range    RSV PCR Not Detected Not Detected   CBC   Result Value Ref Range    WBC 5.4  4.4 - 11.3 x10*3/uL    nRBC 0.0 0.0 - 0.0 /100 WBCs    RBC 4.22 4.00 - 5.20 x10*6/uL    Hemoglobin 13.0 12.0 - 16.0 g/dL    Hematocrit 40.0 36.0 - 46.0 %    MCV 95 80 - 100 fL    MCH 30.8 26.0 - 34.0 pg    MCHC 32.5 32.0 - 36.0 g/dL    RDW 13.6 11.5 - 14.5 %    Platelets 197 150 - 450 x10*3/uL     XR chest 1 view    Result Date: 3/11/2025  Interpreted By:  Abdoulaye Uribe, STUDY: XR CHEST 1 VIEW;  3/11/2025 7:54 pm   INDICATION: Signs/Symptoms:cough fever.     COMPARISON: 01/22/2025.   ACCESSION NUMBER(S): [id number]   ORDERING CLINICIAN: MICHELA HUFFMAN   FINDINGS: AP radiograph of the chest was provided.       CARDIOMEDIASTINAL SILHOUETTE: Cardiomediastinal silhouette is normal in size and configuration.   LUNGS: Lungs appear clear. No pleural effusion or pneumothorax.   ABDOMEN: No remarkable upper abdominal findings.   BONES: No acute osseous changes.       1.  No evidence of acute cardiopulmonary process.       MACRO: None   Signed by: Abdoulaye Uribe 3/11/2025 8:22 PM Dictation workstation:   [id number]        Assessment/Plan   Assessment & Plan  Influenza A virus present  Hypoxic on arrival   Titrate O2 as needed   Await lab results. If renal function stable, start Tamiflu twice daily for 5 days   Duonebs scheduled and PRN   Consult respiratory therapy  Tessalon and Mucinex   Tylenol for fever   Anxiety  Continue escitalopram   Follows with [facility]   Memory loss  Continue donepezil   Coronary artery disease  Angina free   Continue ASA, statin, beta blocker   Gastroesophageal reflux disease  Continue PPI   Hyperlipidemia  Continue Crestor   Hypertension  Continue amlodipine and atenolol with hold parameters   Hypothyroid  Continue levothyroxine     DVT prophylaxis   SCDs in the setting of recent falling     Per daughter, patient is DNR. She requests patient has sitter overnight as she's worried she will try to get out of bed and usually ambulates with a walker when well. States patient is able to eat, but will  need food cut up for her and prefers to drink from a straw.     May Liang, APRN-CNP

## 2025-03-12 NOTE — PROGRESS NOTES
Mariaelena Tavarez is a 89 y.o. female on day 1 of admission presenting with Influenza A virus present.    Subjective   Patient resting in bed. Denies chest pain, shortness of breath, abdominal pain. Oriented to person and time. Family at bedside, questions answered.        Objective     Physical Exam  Constitutional:       Appearance: Normal appearance.   HENT:      Head: Normocephalic and atraumatic.      Mouth/Throat:      Mouth: Mucous membranes are moist.      Pharynx: Oropharynx is clear.   Eyes:      Extraocular Movements: Extraocular movements intact.      Conjunctiva/sclera: Conjunctivae normal.      Pupils: Pupils are equal, round, and reactive to light.   Cardiovascular:      Rate and Rhythm: Normal rate and regular rhythm.      Pulses: Normal pulses.      Heart sounds: Normal heart sounds.   Pulmonary:      Effort: Pulmonary effort is normal.      Breath sounds: Normal breath sounds.   Abdominal:      General: Bowel sounds are normal.      Palpations: Abdomen is soft.      Tenderness: There is no abdominal tenderness.   Musculoskeletal:         General: Normal range of motion.      Cervical back: Normal range of motion and neck supple.   Skin:     General: Skin is warm and dry.      Capillary Refill: Capillary refill takes less than 2 seconds.   Neurological:      General: No focal deficit present.      Mental Status: She is alert. She is confused.   Psychiatric:         Mood and Affect: Mood normal.         Behavior: Behavior normal.         Last Recorded Vitals  Blood pressure 131/62, pulse 84, temperature 36.2 °C (97.1 °F), temperature source Temporal, resp. rate 19, height 1.524 m (5'), weight (!) 39.4 kg (86 lb 14.4 oz), SpO2 97%.  Intake/Output last 3 Shifts:  I/O last 3 completed shifts:  In: - (0 mL/kg)   Out: 550 (12.6 mL/kg) [Urine:550 (0.4 mL/kg/hr)]  Weight: 43.6 kg     Relevant Results  Results for orders placed or performed during the hospital encounter of 03/11/25 (from the past 24 hours)    Sars-CoV-2 and Influenza A/B PCR   Result Value Ref Range    Flu A Result Detected (A) Not Detected    Flu B Result Not Detected Not Detected    Coronavirus 2019, PCR Not Detected Not Detected   RSV PCR   Result Value Ref Range    RSV PCR Not Detected Not Detected   ECG 12 lead   Result Value Ref Range    Ventricular Rate 100 BPM    Atrial Rate 100 BPM    VT Interval 138 ms    QRS Duration 114 ms    QT Interval 372 ms    QTC Calculation(Bazett) 479 ms    P Axis 77 degrees    R Axis -49 degrees    T Axis 39 degrees    QRS Count 16 beats    Q Onset 221 ms    P Onset 152 ms    P Offset 190 ms    T Offset 407 ms    QTC Fredericia 441 ms   Comprehensive metabolic panel   Result Value Ref Range    Glucose 95 74 - 99 mg/dL    Sodium 140 136 - 145 mmol/L    Potassium 3.5 3.5 - 5.3 mmol/L    Chloride 102 98 - 107 mmol/L    Bicarbonate 28 21 - 32 mmol/L    Anion Gap 14 10 - 20 mmol/L    Urea Nitrogen 14 6 - 23 mg/dL    Creatinine 0.81 0.50 - 1.05 mg/dL    eGFR 69 >60 mL/min/1.73m*2    Calcium 9.8 8.6 - 10.3 mg/dL    Albumin 4.4 3.4 - 5.0 g/dL    Alkaline Phosphatase 114 33 - 136 U/L    Total Protein 7.4 6.4 - 8.2 g/dL    AST 27 9 - 39 U/L    Bilirubin, Total 0.9 0.0 - 1.2 mg/dL    ALT 22 7 - 45 U/L   CBC   Result Value Ref Range    WBC 5.4 4.4 - 11.3 x10*3/uL    nRBC 0.0 0.0 - 0.0 /100 WBCs    RBC 4.22 4.00 - 5.20 x10*6/uL    Hemoglobin 13.0 12.0 - 16.0 g/dL    Hematocrit 40.0 36.0 - 46.0 %    MCV 95 80 - 100 fL    MCH 30.8 26.0 - 34.0 pg    MCHC 32.5 32.0 - 36.0 g/dL    RDW 13.6 11.5 - 14.5 %    Platelets 197 150 - 450 x10*3/uL   CBC   Result Value Ref Range    WBC 4.6 4.4 - 11.3 x10*3/uL    nRBC 0.0 0.0 - 0.0 /100 WBCs    RBC 3.95 (L) 4.00 - 5.20 x10*6/uL    Hemoglobin 11.8 (L) 12.0 - 16.0 g/dL    Hematocrit 37.8 36.0 - 46.0 %    MCV 96 80 - 100 fL    MCH 29.9 26.0 - 34.0 pg    MCHC 31.2 (L) 32.0 - 36.0 g/dL    RDW 13.7 11.5 - 14.5 %    Platelets 158 150 - 450 x10*3/uL   Basic metabolic panel   Result Value Ref Range     Glucose 93 74 - 99 mg/dL    Sodium 137 136 - 145 mmol/L    Potassium 3.4 (L) 3.5 - 5.3 mmol/L    Chloride 103 98 - 107 mmol/L    Bicarbonate 27 21 - 32 mmol/L    Anion Gap 10 10 - 20 mmol/L    Urea Nitrogen 14 6 - 23 mg/dL    Creatinine 0.74 0.50 - 1.05 mg/dL    eGFR 77 >60 mL/min/1.73m*2    Calcium 9.4 8.6 - 10.3 mg/dL     *Note: Due to a large number of results and/or encounters for the requested time period, some results have not been displayed. A complete set of results can be found in Results Review.     ECG 12 lead    Result Date: 3/12/2025  Normal sinus rhythm Left anterior fascicular block Right bundle branch block Abnormal ECG When compared with ECG of 11-MAR-2025 19:55, (unconfirmed) No significant change was found Confirmed by Lonnie Valenzuela ([id number]) on 3/12/2025 8:52:37 AM    XR chest 1 view    Result Date: 3/11/2025  Interpreted By:  Abdoulaye Uribe, STUDY: XR CHEST 1 VIEW;  3/11/2025 7:54 pm   INDICATION: Signs/Symptoms:cough fever.     COMPARISON: 01/22/2025.   ACCESSION NUMBER(S): [id number]   ORDERING CLINICIAN: MICHELA HUFFMAN   FINDINGS: AP radiograph of the chest was provided.       CARDIOMEDIASTINAL SILHOUETTE: Cardiomediastinal silhouette is normal in size and configuration.   LUNGS: Lungs appear clear. No pleural effusion or pneumothorax.   ABDOMEN: No remarkable upper abdominal findings.   BONES: No acute osseous changes.       1.  No evidence of acute cardiopulmonary process.       MACRO: None   Signed by: Abdoulaye Uribe 3/11/2025 8:22 PM Dictation workstation:   [id number]       Assessment/Plan   Assessment & Plan  Influenza A virus present  Hypoxic on arrival   Titrate O2 as needed   Start Tamiflu today   Duonebs scheduled and PRN   Consult respiratory therapy  Tessalon and Mucinex   Tylenol for fever   Anxiety  Continue Citalopram   Follows with [facility]   Memory loss  Continue donepezil   Coronary artery disease  Angina free   Continue ASA, statin, beta blocker   Gastroesophageal reflux  disease  Continue PPI   Hyperlipidemia  Continue Crestor   Hypertension  Continue amlodipine and atenolol with hold parameters   Hypothyroid  Continue levothyroxine     DVT prophylaxis   Heparin     May Liang, APRN-CNP

## 2025-03-12 NOTE — PROGRESS NOTES
"   03/12/25 1327   Discharge Planning   Living Arrangements Other (Comment)  ([facility])   Support Systems Children   Assistance Needed Needs assistance with ADLs, Needs assistance with homemaking  ADL Assistance:  (able to feed self \"if food placed within reach\" per daughter)  Homemaking Assistance:  (n/a - provided by facility)  Ambulatory Assistance: Needs assistance (daughter walks patient daily with rollator ~ \"3 laps\" at the facility)   Type of Residence Assisted living  ([facility])   Do you have animals or pets at home? No   Care Facility Name [facility]   Who is requesting discharge planning? Provider   Home or Post Acute Services Post acute facilities (Rehab/SNF/etc)   Type of Post Acute Facility Services Assisted living   Expected Discharge Disposition Home  (Will return back to [facility], she is in the memory care unit and does receive PT/OT)   Does the patient need discharge transport arranged? Yes   RoundTrip coordination needed? Yes   Has discharge transport been arranged? No     88 y/o female who presented to ED wtih fever, from [facility] unit. Pt admitted for further management of influenza A.   Patient lives at [facility] on the Memory Care Unit and does requires assistance with her ADLS. Per pt's daughter, pt ambulates with rollator at baseline. Is currently an increased falls risk due to imparied balance, decreased strength and decreased activity tolerance.   Home Adaptive Equipment: Walker rolling or standard, Wheelchair-manual, Other (Comment) (rollator)  Home Layout: One level  Home Access: Level entry  Bathroom Shower/Tub: Walk-in shower  Bathroom Toilet: Handicapped height  Home Living Comments: Per daughter, pt resides in the memory care unit of [place] and was receiving PT/OT services. Will resume upon return.   Needs assistance with ADLs, Needs assistance with homemaking  ADL Assistance:  (able to feed self " "\"if food placed within reach\" per daughter)  Ambulatory Assistance: Needs assistance (daughter walks patient daily with rollator ~ \"3 laps\" at the facility)  Vocational: Retired (Business owner)  Leisure: enjoys \"visiting and talking to neighbors\"    PLAN: Return back to [facility] and resume PT/OT service. Will call [facility] to update them   "

## 2025-03-12 NOTE — ASSESSMENT & PLAN NOTE
Hypoxic on arrival   Titrate O2 as needed   Await lab results. If renal function stable, start Tamiflu twice daily for 5 days   Duonebs scheduled and PRN   Consult respiratory therapy  Tessalon and Mucinex   Tylenol for fever

## 2025-03-12 NOTE — ASSESSMENT & PLAN NOTE
Hypoxic on arrival   Titrate O2 as needed   Start Tamiflu today   Yanelis scheduled and PRN   Consult respiratory therapy  Tessalon and Mucinex   Tylenol for fever

## 2025-03-12 NOTE — CARE PLAN
The patient's goals for the shift include n/a     The clinical goals for the shift include safety and comfort    Over the shift, the patient did not make progress toward the following goals. Barriers to progression include n/a. Recommendations to address these barriers include continue plan of care.

## 2025-03-12 NOTE — PROGRESS NOTES
Evaluation/Treatment    Patient Name: Mariaelena Tavarez  MRN: [id number]  Department: [address]  Room: [facility]315-A  Today's Date: 03/12/25  Time Calculation  Start Time: 0756  Stop Time: 0825  Time Calculation (min): 29 min       Assessment:  OT Assessment: 89 yo female with history of dementia and multiple falls presents with weakness, increased confusion, lethargy, a fever, and rigors/chills.  Pt tested positive for Flu A and was admitted for medical management of same.  On eval, patient presents below baseline functional status d/t generalized weakness and would benefit from acute OT services to provide progressive strengthening in order to increase functional capacity and safety with mobility.  Prognosis: Good  Barriers to Discharge Home: Physical needs  Physical Needs: 24hr mobility assistance needed, 24hr ADL assistance needed, High falls risk due to function or environment  Evaluation/Treatment Tolerance: Patient limited by fatigue  Medical Staff Made Aware: Yes  End of Session Communication: Bedside nurse  End of Session Patient Position: Up in chair, Alarm on  OT Assessment Results: Decreased ADL status, Decreased upper extremity strength, Decreased upper extremity range of motion, Decreased safe judgment during ADL, Decreased cognition, Decreased endurance, Decreased functional mobility, Decreased trunk control for functional activities  Prognosis: Good  Barriers to Discharge: None  Evaluation/Treatment Tolerance: Patient limited by fatigue  Medical Staff Made Aware: Yes  Strengths: Rehab experience, Premorbid level of function  Barriers to Participation: Comorbidities    Plan:  Treatment Interventions: ADL retraining, Functional transfer training, UE strengthening/ROM, Endurance training, Patient/family training, Equipment evaluation/education, Compensatory technique education  OT Frequency: 3 times per week  OT Discharge Recommendations: Moderate intensity level of continued care  Equipment Recommended upon  "Discharge: Wheeled walker  OT Recommended Transfer Status: Assist of 2  OT - OK to Discharge: Yes  Treatment Interventions: ADL retraining, Functional transfer training, UE strengthening/ROM, Endurance training, Patient/family training, Equipment evaluation/education, Compensatory technique education      Subjective   Current Problem:  1. Influenza A virus present        2. Fever, unspecified fever cause        3. Hypoxia          General:   OT Received On: 03/12/25  General  Reason for Referral: Impaired ADLs, +influenza  Referred By: May Liang CNP  Past Medical History Relevant to Rehab: dementia, CAD, anxiety, multiple falls, OA, HTN, HLD  Family/Caregiver Present: Yes (Daughter and son-in-law)  Caregiver Feedback: supportive  Co-Treatment: PT  Co-Treatment Reason: partial to maximize safety and outcome  Prior to Session Communication: Bedside nurse  Patient Position Received: Bed, 3 rail up, Alarm on  Preferred Learning Style: verbal, visual  General Comment: Cleared by nursing and agreeable for therapy \"after I finish washing my daughter's hair\"     Precautions:  Hearing/Visual Limitations: age-related impairments  Medical Precautions: Fall precautions, Infection precautions, Other (comment) (DNR CCA)  Precautions Comment: +Flu A     Date/Time Vitals Session Patient Position Pulse Resp SpO2 BP MAP (mmHg)    03/12/25 0752 --  --  --  --  97 %  --  --     03/12/25 0821 --  --  84  --  97 %  131/62  85      Pain:  Pain Assessment  Pain Assessment: FLACC (Face, Legs, Activity, Cry, Consolability)  0-10 (Numeric) Pain Score: 0 - No pain      Objective   Cognition:  Overall Cognitive Status: Impaired at baseline  Orientation Level: Disoriented to place, Disoriented to time, Disoriented to situation  Insight: Severe  Processing Speed: Delayed    Home Living:  Type of Home: Long Term Care facility  Lives With: Other (Comment) (Care Staff)  Home Adaptive Equipment: Walker rolling or standard, Wheelchair-manual, " "Other (Comment) (rollator)  Home Layout: One level  Home Access: Level entry  Bathroom Shower/Tub: Walk-in shower  Bathroom Toilet: Handicapped height  Bathroom Equipment: Grab bars in shower, Built-in shower seat  Home Living Comments: Pt lives on the [facility] care [facility] [place] where she was receiving PT/OT services per daughter.    Prior Function:  Level of [place]: Needs assistance with ADLs, Needs assistance with homemaking  ADL Assistance:  (able to feed self \"if food placed within reach\" per daughter)  Homemaking Assistance:  (n/a - provided by facility)  Ambulatory Assistance: Needs assistance (daughter walks patient daily with rollator ~ \"3 laps\" at the facility)  Vocational: Retired (Business owner)  Leisure: enjoys \"visiting and talking to neighbors\"    ADL:  Eating Assistance: Minimal  Eating Deficit: Bringing food to mouth assist, Increased time to complete (per clinical judgement)  Grooming Assistance: Moderate  Grooming Deficit: Brushing hair, Teeth care (washing face with max cues and hand-over-hand assist to initiate task this date)  Bathing Assistance: Maximal  Bathing Deficit: Buttocks, Perineal area, Right arm, Right lower leg including foot, Left lower leg including foot  UE Dressing Assistance: Maximal  UE Dressing Deficit: Thread RUE, Pull over head, Pull down in back (per clinical judgement)  LE Dressing Assistance: Total  LE Dressing Deficit:  (total assist donning/doffing socks and protective briefs this date)  Toileting Assistance with Device: Total  Toileting Deficit: Urinary Catheter (+purewick)    Activity Tolerance:  Endurance: Decreased tolerance for upright activites    Bed Mobility/Transfers: Bed Mobility  Bed Mobility: Yes  Bed Mobility 1  Bed Mobility 1: Supine to sitting  Level of Assistance 1: Maximum assistance  Bed Mobility Comments 1: toward the right side of bed with head elevated ~50 degrees; heavy assist with trunk and to scoot forward    Transfers  Transfer: " Yes  Transfer 1  Transfer From 1: Bed to  Transfer to 1: Chair with arms  Technique 1: Stand pivot, To right  Transfer Device 1: Walker  Transfer Level of Assistance 1: Maximum assistance, +2  Trials/Comments 1: hands on stabilized walker, blocking feet, and max cues for safety/posture throughout d/t posterior lean    Sitting Balance:  Static Sitting Balance  Static Sitting-Balance Support: Bilateral upper extremity supported  Static Sitting-Level of Assistance: Moderate assistance  Static Sitting-Comment/Number of Minutes: heavy posterior lean    Sensation:  Light Touch: No apparent deficits    Strength:  Strength Comments: BUEs=~3/5 grossly based on function/OBS    Hand Function:  Hand Function  Gross Grasp: Functional  Coordination: Functional    Extremities: RUE   RUE : Within Functional Limits and LUE   LUE: Exceptions to WFL (rotator cuff dysfunction)    Outcome Measures: [facility] Daily Activity  Putting on and taking off regular lower body clothing: Total  Bathing (including washing, rinsing, drying): A lot  Putting on and taking off regular upper body clothing: A lot  Toileting, which includes using toilet, bedpan or urinal: Total  Taking care of personal grooming such as brushing teeth: A lot  Eating Meals: A little  Daily Activity - Total Score: 11    Education Documentation  ADL Training, taught by Bailee Orosco OT at 3/12/2025  9:03 AM.  Learner: Patient  Readiness: Acceptance  Method: Explanation  Response: Needs Reinforcement  Comment: ADL and functional transfer retraininig initiated    OP EDUCATION:  Education  Individual(s) Educated: Patient  Education Provided: Fall precautons, Risk and benefits of OT discussed with patient or other, POC discussed and agreed upon  Risk and Benefits Discussed with Patient/Caregiver/Other: yes  Patient/Caregiver Demonstrated Understanding: yes  Plan of Care Discussed and Agreed Upon: yes  Patient Response to Education: Patient/Caregiver Verbalized Understanding of  Information    Goals:  Encounter Problems       Encounter Problems (Active)       ADLs       Patient will complete ADL tasks, with moderate assist using AE need in order to increase patient's safety and independence with self-care tasks.        Functional transfers (Progressing)       Start:  03/12/25    Expected End:  03/26/25       Patient will complete functional transfers with minimal assist  using least restrictive device, in order to increase patient's safety and independence with daily tasks.           Activity tolerance (Progressing)       Start:  03/12/25    Expected End:  03/26/25       Patient will demonstrate the ability to participate in functional activity at least >/= 25 minutes in order to increase patient's safety and independence with daily tasks.           OT Goal 3 (Progressing)       Start:  03/12/25    Expected End:  03/26/25

## 2025-03-12 NOTE — PROGRESS NOTES
Physical Therapy    Physical Therapy Evaluation    Patient Name: Mariaelena Tavarez  MRN: [id number]  Department: [address]  Room: [facility]315-A  Today's Date: 3/12/2025   Time Calculation  Start Time: 0813  Stop Time: 0827  Time Calculation (min): 14 min    Assessment/Plan   PT Assessment  PT Assessment Results: Decreased strength, Decreased endurance, Impaired balance, Decreased mobility, Decreased coordination, Decreased cognition, Impaired judgement, Decreased safety awareness, Decreased skin integrity  Rehab Prognosis: Good  Barriers to Discharge Home: Physical needs, Cognition needs  Cognition Needs: 24hr supervision for safety awareness needed  Physical Needs: 24hr mobility assistance needed, High falls risk due to function or environment  Evaluation/Treatment Tolerance: Patient limited by fatigue  Medical Staff Made Aware: Yes  End of Session Communication: Bedside nurse  End of Session Patient Position: Up in chair, Alarm on      Assessment Comment: 89 y/o female who presented to ED wtih fever, from [facility]. Pt admitted for further management of influenza A. Per pt's daughter, pt ambulates with rollator at baseline. Is currently an increased falls risk due to imparied balance, decreased strength and decreased activity tolerance. Woudl benefit from cont PT services to maximize safe mobiltiy.        IP OR SWING BED PT PLAN  Inpatient or Swing Bed: Inpatient  PT Plan  Treatment/Interventions: Bed mobility, Transfer training, Gait training, Balance training, Neuromuscular re-education, Strengthening, Endurance training, Therapeutic exercise, Therapeutic activity  PT Plan: Ongoing PT  PT Frequency: 3 times per week  PT Discharge Recommendations: Moderate intensity level of continued care  Equipment Recommended upon Discharge: Wheeled walker  PT Recommended Transfer Status: Assist x2, Assistive device  PT - OK to Discharge: Yes    Subjective   General Visit Information:  General  Reason for Referral: impaired  "mobility; FLU A  Past Medical History Relevant to Rehab: dementia, CAD, anxiety, multiple falls, OA, HTN, HLD  Family/Caregiver Present: Yes  Caregiver Feedback: daughter at bedside; supportive  Co-Treatment: OT  Co-Treatment Reason: safe OOB mobility  Prior to Session Communication: Bedside nurse  Patient Position Received: Bed, 2 rail up, Alarm off, caregiver present  Preferred Learning Style: verbal, visual  General Comment: 88 y/o female who presented to ED from facility due to fever. Admitted with influenza. Pt sitting on EOB with OT at bedside,  upon PT arrival. Agreeable to participate. JESSICA english  Home Living:  Home Living  Type of Home: Long Term Care facility  Lives With: Other (Comment) (Care Staff)  Home Adaptive Equipment: Walker rolling or standard, Wheelchair-manual, Other (Comment) (rollator)  Home Layout: One level  Home Access: Level entry  Bathroom Shower/Tub: Walk-in shower  Bathroom Toilet: Handicapped height  Home Living Comments: Per daughter, pt resides in the memory care unit [facility] and was receiving PT/OT services  Prior Level of Function:  Prior Function Per Pt/Caregiver Report  Level of [place]: Needs assistance with ADLs, Needs assistance with homemaking  ADL Assistance:  (able to feed self \"if food placed within reach\" per daughter)  Homemaking Assistance:  (n/a - provided by facility)  Ambulatory Assistance: Needs assistance (daughter walks patient daily with rollator ~ \"3 laps\" at the facility)  Vocational: Retired (Business owner)  Leisure: enjoys \"visiting and talking to neighbors\"  Precautions:  Precautions  Medical Precautions: Fall precautions, Infection precautions  Precautions Comment: Droplet isolation for FLU A       Objective   Pain:  Pain Assessment  Pain Assessment: FLACC (Face, Legs, Activity, Cry, Consolability)  0-10 (Numeric) Pain Score: 0 - No pain  Cognition:  Cognition  Orientation Level:  (Oriented to self)  Cognition Comments: Pleasantly confused. " "Able to follow simple, motor commands with cueing and repetition. Inititation of gross motor tasks.  Insight: Moderate  Processing Speed: Delayed    General Assessments:  General Observation  General Observation: pleasantly confused     Activity Tolerance  Endurance: Decreased tolerance for upright activites    Sensation  Light Touch: No apparent deficits    Strength  Strength Comments: B LEs > 3/5 observed. Decreased use and movement of LUE observed due to \"bad arm\"  Coordination  Coordination Comment: baseline tremors, UEs    Postural Control  Posture Comment: kyphotic posture    Static Sitting Balance  Static Sitting-Level of Assistance: Contact guard, Minimum assistance  Static Sitting-Comment/Number of Minutes: Sat on EOB wtih increased L lateral lean. Min assist to maintain seated balance.  Dynamic Sitting Balance  Dynamic Sitting-Comments: Assistance required for ADL tasks    Static Standing Balance  Static Standing-Level of Assistance: Moderate assistance  Static Standing-Comment/Number of Minutes: UE support of walker, increased posterior wt shift. Stood for about 2 mintues with repeated cueing to shift wt anteriorly while requiring assistance for ADL tasks  Dynamic Standing Balance  Dynamic Standing-Comments: Unsteady, shuffle like gait, increased posterior tendency  Functional Assessments:  Bed Mobility  Bed Mobility:  (Pt sitting on EOB upon arrival and in chair at end of session)    Transfer 1  Technique 1: Sit to stand, Stand to sit  Transfer Device 1: Walker  Transfer Level of Assistance 1: Moderate assistance, +2  Trials/Comments 1: from elevated EOB, onto chair with arms  Transfers 2  Transfer From 2: Bed to  Transfer to 2: Chair with arms  Technique 2: Sit to stand, Stand to sit  Transfer Device 2: Walker  Transfer Level of Assistance 2: Moderate assistance, +2  Trials/Comments 2: shuffle like gait from bed to chair about 1 ft. walker management provided.       Extremity/Trunk Assessments:  RLE "   RLE : Within Functional Limits  LLE   LLE : Within Functional Limits  Outcome Measures:  [facility] Basic Mobility  Turning from your back to your side while in a flat bed without using bedrails: A lot  Moving from lying on your back to sitting on the side of a flat bed without using bedrails: A lot  Moving to and from bed to chair (including a wheelchair): A lot  Standing up from a chair using your arms (e.g. wheelchair or bedside chair): A lot  To walk in hospital room: A lot  Climbing 3-5 steps with railing: Total  Basic Mobility - Total Score: 11    Encounter Problems       Encounter Problems (Active)       Balance       sitting (Progressing)       Start:  03/12/25    Expected End:  03/26/25       Pt will sit on EOB with upright posture and supervision while performing activities, no LOB         standing (Progressing)       Start:  03/12/25    Expected End:  03/26/25       Pt will stand with UE support of walker and CGA, no LOB, for 2 minutes            Mobility       bed mobility (Progressing)       Start:  03/12/25    Expected End:  03/26/25       Pt will perform sup to/from sit transfer with supervision          ambulation (Progressing)       Start:  03/12/25    Expected End:  03/26/25       Pt will amb > 30  ft with wheeled walker and min assist            PT Transfers       sit to stand (Progressing)       Start:  03/12/25    Expected End:  03/26/25       Pt will perform sit to stand transfer with walker and CGA         bed to chair (Progressing)       Start:  03/12/25    Expected End:  03/26/25       Pt will perform bed to chair transfer with walker and CGA            Pain - Adult          Safety       LTG - Patient will utilize safety techniques (Progressing)       Start:  03/12/25    Expected End:  03/26/25                   Education Documentation  Precautions, taught by Maureen Da Silva, PT at 3/12/2025  9:17 AM.  Learner: Patient  Readiness: Acceptance  Method: Explanation  Response: Needs  Reinforcement  Comment: PT POC, falls risk    Mobility Training, taught by Maureen Da Silva PT at 3/12/2025  9:17 AM.  Learner: Patient  Readiness: Acceptance  Method: Explanation  Response: Needs Reinforcement  Comment: PT POC, falls risk    Education Comments  No comments found.

## 2025-03-13 LAB
ANION GAP SERPL CALCULATED.3IONS-SCNC: 13 MMOL/L (ref 10–20)
BUN SERPL-MCNC: 13 MG/DL (ref 6–23)
CALCIUM SERPL-MCNC: 9.7 MG/DL (ref 8.6–10.3)
CHLORIDE SERPL-SCNC: 102 MMOL/L (ref 98–107)
CO2 SERPL-SCNC: 28 MMOL/L (ref 21–32)
CREAT SERPL-MCNC: 0.8 MG/DL (ref 0.5–1.05)
EGFRCR SERPLBLD CKD-EPI 2021: 71 ML/MIN/1.73M*2
ERYTHROCYTE [DISTWIDTH] IN BLOOD BY AUTOMATED COUNT: 14 % (ref 11.5–14.5)
GLUCOSE SERPL-MCNC: 96 MG/DL (ref 74–99)
HCT VFR BLD AUTO: 38.2 % (ref 36–46)
HGB BLD-MCNC: 13 G/DL (ref 12–16)
MCH RBC QN AUTO: 30.6 PG (ref 26–34)
MCHC RBC AUTO-ENTMCNC: 34 G/DL (ref 32–36)
MCV RBC AUTO: 90 FL (ref 80–100)
NRBC BLD-RTO: 0 /100 WBCS (ref 0–0)
PLATELET # BLD AUTO: 142 X10*3/UL (ref 150–450)
POTASSIUM SERPL-SCNC: 3.5 MMOL/L (ref 3.5–5.3)
RBC # BLD AUTO: 4.25 X10*6/UL (ref 4–5.2)
SODIUM SERPL-SCNC: 139 MMOL/L (ref 136–145)
WBC # BLD AUTO: 6.2 X10*3/UL (ref 4.4–11.3)

## 2025-03-13 PROCEDURE — 2500000001 HC RX 250 WO HCPCS SELF ADMINISTERED DRUGS (ALT 637 FOR MEDICARE OP): Performed by: NURSE PRACTITIONER

## 2025-03-13 PROCEDURE — 36415 COLL VENOUS BLD VENIPUNCTURE: CPT | Performed by: NURSE PRACTITIONER

## 2025-03-13 PROCEDURE — 2500000002 HC RX 250 W HCPCS SELF ADMINISTERED DRUGS (ALT 637 FOR MEDICARE OP, ALT 636 FOR OP/ED): Performed by: STUDENT IN AN ORGANIZED HEALTH CARE EDUCATION/TRAINING PROGRAM

## 2025-03-13 PROCEDURE — 1100000001 HC PRIVATE ROOM DAILY

## 2025-03-13 PROCEDURE — 2500000004 HC RX 250 GENERAL PHARMACY W/ HCPCS (ALT 636 FOR OP/ED): Performed by: NURSE PRACTITIONER

## 2025-03-13 PROCEDURE — 85027 COMPLETE CBC AUTOMATED: CPT | Performed by: NURSE PRACTITIONER

## 2025-03-13 PROCEDURE — 99239 HOSP IP/OBS DSCHRG MGMT >30: CPT | Performed by: NURSE PRACTITIONER

## 2025-03-13 PROCEDURE — 82374 ASSAY BLOOD CARBON DIOXIDE: CPT | Performed by: NURSE PRACTITIONER

## 2025-03-13 PROCEDURE — 2500000002 HC RX 250 W HCPCS SELF ADMINISTERED DRUGS (ALT 637 FOR MEDICARE OP, ALT 636 FOR OP/ED): Performed by: NURSE PRACTITIONER

## 2025-03-13 PROCEDURE — 94640 AIRWAY INHALATION TREATMENT: CPT

## 2025-03-13 RX ORDER — OSELTAMIVIR PHOSPHATE 30 MG/1
30 CAPSULE ORAL 2 TIMES DAILY
Qty: 7 CAPSULE | Refills: 0 | Status: SHIPPED | OUTPATIENT
Start: 2025-03-13 | End: 2025-03-17

## 2025-03-13 RX ORDER — AMLODIPINE BESYLATE 10 MG/1
10 TABLET ORAL DAILY
Start: 2025-03-14

## 2025-03-13 RX ADMIN — LEVOTHYROXINE SODIUM 25 MCG: 0.03 TABLET ORAL at 06:52

## 2025-03-13 RX ADMIN — OSELTAMAVIR PHOSPHATE 30 MG: 30 CAPSULE ORAL at 09:03

## 2025-03-13 RX ADMIN — AMLODIPINE BESYLATE 10 MG: 10 TABLET ORAL at 09:03

## 2025-03-13 RX ADMIN — CITALOPRAM HYDROBROMIDE 10 MG: 10 TABLET ORAL at 09:03

## 2025-03-13 RX ADMIN — IPRATROPIUM BROMIDE AND ALBUTEROL SULFATE 3 ML: 2.5; .5 SOLUTION RESPIRATORY (INHALATION) at 20:16

## 2025-03-13 RX ADMIN — GABAPENTIN 100 MG: 100 CAPSULE ORAL at 20:00

## 2025-03-13 RX ADMIN — DONEPEZIL HYDROCHLORIDE 10 MG: 10 TABLET, FILM COATED ORAL at 20:00

## 2025-03-13 RX ADMIN — ATENOLOL 25 MG: 25 TABLET ORAL at 09:03

## 2025-03-13 RX ADMIN — ROSUVASTATIN 40 MG: 20 TABLET, FILM COATED ORAL at 09:03

## 2025-03-13 RX ADMIN — GABAPENTIN 100 MG: 100 CAPSULE ORAL at 09:03

## 2025-03-13 RX ADMIN — GUAIFENESIN 600 MG: 600 TABLET ORAL at 09:03

## 2025-03-13 RX ADMIN — GABAPENTIN 100 MG: 100 CAPSULE ORAL at 15:12

## 2025-03-13 RX ADMIN — OSELTAMAVIR PHOSPHATE 30 MG: 30 CAPSULE ORAL at 20:00

## 2025-03-13 RX ADMIN — IPRATROPIUM BROMIDE AND ALBUTEROL SULFATE 3 ML: 2.5; .5 SOLUTION RESPIRATORY (INHALATION) at 13:05

## 2025-03-13 RX ADMIN — ASPIRIN 81 MG CHEWABLE TABLET 81 MG: 81 TABLET CHEWABLE at 09:03

## 2025-03-13 RX ADMIN — GUAIFENESIN 600 MG: 600 TABLET ORAL at 20:00

## 2025-03-13 RX ADMIN — IPRATROPIUM BROMIDE AND ALBUTEROL SULFATE 3 ML: 2.5; .5 SOLUTION RESPIRATORY (INHALATION) at 07:12

## 2025-03-13 RX ADMIN — PANTOPRAZOLE SODIUM 40 MG: 40 TABLET, DELAYED RELEASE ORAL at 09:03

## 2025-03-13 ASSESSMENT — COGNITIVE AND FUNCTIONAL STATUS - GENERAL
DRESSING REGULAR UPPER BODY CLOTHING: A LITTLE
HELP NEEDED FOR BATHING: A LITTLE
MOVING TO AND FROM BED TO CHAIR: A LITTLE
EATING MEALS: A LITTLE
TURNING FROM BACK TO SIDE WHILE IN FLAT BAD: A LITTLE
DRESSING REGULAR LOWER BODY CLOTHING: A LITTLE
DRESSING REGULAR UPPER BODY CLOTHING: A LITTLE
EATING MEALS: A LITTLE
MOVING TO AND FROM BED TO CHAIR: A LITTLE
MOVING FROM LYING ON BACK TO SITTING ON SIDE OF FLAT BED WITH BEDRAILS: A LITTLE
MOVING FROM LYING ON BACK TO SITTING ON SIDE OF FLAT BED WITH BEDRAILS: A LITTLE
STANDING UP FROM CHAIR USING ARMS: A LITTLE
PERSONAL GROOMING: A LITTLE
DRESSING REGULAR LOWER BODY CLOTHING: A LITTLE
PERSONAL GROOMING: A LITTLE
MOBILITY SCORE: 17
MOBILITY SCORE: 17
DAILY ACTIVITIY SCORE: 18
TOILETING: A LITTLE
WALKING IN HOSPITAL ROOM: A LITTLE
TURNING FROM BACK TO SIDE WHILE IN FLAT BAD: A LITTLE
DAILY ACTIVITIY SCORE: 18
HELP NEEDED FOR BATHING: A LITTLE
WALKING IN HOSPITAL ROOM: A LITTLE
TOILETING: A LITTLE
STANDING UP FROM CHAIR USING ARMS: A LITTLE
CLIMB 3 TO 5 STEPS WITH RAILING: A LOT
CLIMB 3 TO 5 STEPS WITH RAILING: A LOT

## 2025-03-13 ASSESSMENT — PAIN SCALES - WONG BAKER: WONGBAKER_NUMERICALRESPONSE: NO HURT

## 2025-03-13 ASSESSMENT — PAIN SCALES - GENERAL
PAINLEVEL_OUTOF10: 0 - NO PAIN
PAINLEVEL_OUTOF10: 0 - NO PAIN

## 2025-03-13 NOTE — CARE PLAN
The patient's goals for the shift include  monitor temp    The clinical goals for the shift include safety, monitor temp    Problem: Safety - Adult  Goal: Free from fall injury  Outcome: Progressing

## 2025-03-13 NOTE — DISCHARGE SUMMARY
Discharge Diagnosis  Influenza A virus present    Issues Requiring Follow-Up      Discharge Meds     Medication List      START taking these medications     oseltamivir 30 mg capsule; Commonly known as: Tamiflu; Take 1 capsule   (30 mg) by mouth 2 times a day for 7 doses.     CHANGE how you take these medications     amLODIPine 10 mg tablet; Commonly known as: Norvasc; Take 1 tablet (10   mg) by mouth once daily.; Start taking on: March 14, 2025; What changed:   additional instructions     CONTINUE taking these medications     acetaminophen 325 mg tablet; Commonly known as: TylenoL; Take 2 tablets   (650 mg) by mouth 3 times a day.   aspirin 81 mg chewable tablet   atenolol 25 mg tablet; Commonly known as: Tenormin; Take 1 tablet (25   mg) by mouth once daily.   donepezil 10 mg tablet; Commonly known as: Aricept; TAKE ONE TABLET BY   MOUTH DAILY AT BEDTIME AS DIRECTED   escitalopram 5 mg tablet; Commonly known as: Lexapro; 1 tablet a day   gabapentin 100 mg capsule; Commonly known as: Neurontin; Take 1 capsule   (100 mg) by mouth 3 times a day.   levothyroxine 25 mcg tablet; Commonly known as: Synthroid, Levoxyl; Take   1 tablet (25 mcg) by mouth once daily in the morning. Take before meals. .   for 90   pantoprazole 40 mg EC tablet; Commonly known as: ProtoNix; Take 1 tablet   (40 mg) by mouth once daily.   rosuvastatin 40 mg tablet; Commonly known as: Crestor; Take 1 tablet (40   mg) by mouth once daily.     STOP taking these medications     alum-mag hydroxide-simeth 200-200-20 mg/5 mL oral suspension; Commonly   known as: Mylanta   cholecalciferol 50 MCG (2000 UT) tablet; Commonly known as: Vitamin D-3   cranberry 450 mg tablet; Generic drug: cranberry fruit   d-mannose 500 mg capsule   lidocaine 4 % patch; Commonly known as: Salonpas (lidocaine)   magnesium hydroxide 400 mg/5 mL suspension; Commonly known as: Milk of   Magnesia       Test Results Pending At Discharge  Pending Labs       Order Current Status     Extra Urine Gray Tube Collected (03/12/25 1418)    Urinalysis with Reflex Culture and Microscopic In process            Hospital Course   Pt with Dementia, admitted for weakness secondary to Influenza A. Pt was treated with Tamiflu and gentle oxygen. She has been weaned to RA and has clinically improved. Due to continued weakness the daughter has requested SNF placement. Pt medically stable for discharge to SNF before returning to her AL.     Pertinent Physical Exam At Time of Discharge  Physical Exam  HENT:      Head: Normocephalic and atraumatic.      Nose: Nose normal.      Mouth/Throat:      Mouth: Mucous membranes are moist.      Pharynx: Oropharynx is clear.   Eyes:      Extraocular Movements: Extraocular movements intact.      Pupils: Pupils are equal, round, and reactive to light.   Cardiovascular:      Rate and Rhythm: Normal rate and regular rhythm.      Pulses: Normal pulses.   Pulmonary:      Effort: Pulmonary effort is normal.      Breath sounds: Normal breath sounds.   Abdominal:      General: Abdomen is flat. Bowel sounds are normal.      Palpations: Abdomen is soft.   Musculoskeletal:         General: Normal range of motion.      Comments: Generalized weakness   Skin:     General: Skin is warm and dry.      Capillary Refill: Capillary refill takes less than 2 seconds.   Neurological:      General: No focal deficit present.      Mental Status: She is oriented to person, place, and time.   Psychiatric:         Mood and Affect: Mood normal.         Outpatient Follow-Up  No future appointments.      Jannie Clarke, SHANIKA-CNP

## 2025-03-13 NOTE — CARE PLAN
The patient's goals for the shift include      The clinical goals for the shift include safety, monitor temp      Problem: Pain - Adult  Goal: Verbalizes/displays adequate comfort level or baseline comfort level  Outcome: Progressing     Problem: Safety - Adult  Goal: Free from fall injury  Outcome: Progressing     Problem: Discharge Planning  Goal: Discharge to home or other facility with appropriate resources  Outcome: Progressing     Problem: Chronic Conditions and Co-morbidities  Goal: Patient's chronic conditions and co-morbidity symptoms are monitored and maintained or improved  Outcome: Progressing     Problem: Nutrition  Goal: Nutrient intake appropriate for maintaining nutritional needs  Outcome: Progressing     Problem: Skin  Goal: Participates in plan/prevention/treatment measures  Outcome: Progressing  Goal: Prevent/minimize sheer/friction injuries  Outcome: Progressing  Flowsheets (Taken 3/13/2025 0218)  Prevent/minimize sheer/friction injuries:   Use pull sheet   Turn/reposition every 2 hours/use positioning/transfer devices   HOB 30 degrees or less  Goal: Promote/optimize nutrition  Outcome: Progressing     Problem: Fall/Injury  Goal: Verbalize understanding of personal risk factors for fall in the hospital  Outcome: Progressing  Goal: Verbalize understanding of risk factor reduction measures to prevent injury from fall in the home  Outcome: Progressing  Goal: Use assistive devices by end of the shift  Outcome: Progressing  Goal: Pace activities to prevent fatigue by end of the shift  Outcome: Progressing     Problem: Fall/Injury  Goal: Not fall by end of shift  Outcome: Met  Goal: Be free from injury by end of the shift  Outcome: Met

## 2025-03-13 NOTE — PROGRESS NOTES
03/13/25 1138   Discharge Planning   Living Arrangements Other (Comment)  (assisted Living)   Support Systems Children   Type of Residence Assisted living   Care Facility Name [facility]   Who is requesting discharge planning? Provider   Home or Post Acute Services Post acute facilities (Rehab/SNF/etc)   Type of Post Acute Facility Services Skilled nursing   Expected Discharge Disposition SNF  (referrals made into [facility])   Does the patient need discharge transport arranged? Yes   RoundTrip coordination needed? Yes   Has discharge transport been arranged? No     Spoke with daughter and is concerned about mom going back at the assisted living. Daughter is agreeable for patient to go to SNF and made choices:  [facility].   Referrals placed into [facility] and will need a precert.  Spoke with Jazmin Castro from [facility] ([phone]), discussed discharge plan and also agreeable for patient to go to skilled rehab and then return back to [facility]. Will keep updated.     PLAN: Need accepting facility, will need precert.    [facility] care unit. Precert started 3/13

## 2025-03-14 VITALS
WEIGHT: 86.9 LBS | OXYGEN SATURATION: 98 % | TEMPERATURE: 98.6 F | BODY MASS INDEX: 17.06 KG/M2 | DIASTOLIC BLOOD PRESSURE: 50 MMHG | RESPIRATION RATE: 18 BRPM | SYSTOLIC BLOOD PRESSURE: 124 MMHG | HEART RATE: 82 BPM | HEIGHT: 60 IN

## 2025-03-14 LAB
ATRIAL RATE: 98 BPM
P AXIS: 70 DEGREES
P OFFSET: 189 MS
P ONSET: 141 MS
PR INTERVAL: 160 MS
Q ONSET: 221 MS
QRS COUNT: 16 BEATS
QRS DURATION: 118 MS
QT INTERVAL: 370 MS
QTC CALCULATION(BAZETT): 472 MS
QTC FREDERICIA: 435 MS
R AXIS: -51 DEGREES
T AXIS: 31 DEGREES
T OFFSET: 406 MS
VENTRICULAR RATE: 98 BPM

## 2025-03-14 PROCEDURE — 97116 GAIT TRAINING THERAPY: CPT | Mod: GP

## 2025-03-14 PROCEDURE — 2500000004 HC RX 250 GENERAL PHARMACY W/ HCPCS (ALT 636 FOR OP/ED): Performed by: NURSE PRACTITIONER

## 2025-03-14 PROCEDURE — 99231 SBSQ HOSP IP/OBS SF/LOW 25: CPT | Performed by: NURSE PRACTITIONER

## 2025-03-14 PROCEDURE — 2500000002 HC RX 250 W HCPCS SELF ADMINISTERED DRUGS (ALT 637 FOR MEDICARE OP, ALT 636 FOR OP/ED): Performed by: STUDENT IN AN ORGANIZED HEALTH CARE EDUCATION/TRAINING PROGRAM

## 2025-03-14 PROCEDURE — 2500000002 HC RX 250 W HCPCS SELF ADMINISTERED DRUGS (ALT 637 FOR MEDICARE OP, ALT 636 FOR OP/ED): Performed by: NURSE PRACTITIONER

## 2025-03-14 PROCEDURE — 94640 AIRWAY INHALATION TREATMENT: CPT

## 2025-03-14 PROCEDURE — 2500000001 HC RX 250 WO HCPCS SELF ADMINISTERED DRUGS (ALT 637 FOR MEDICARE OP): Performed by: NURSE PRACTITIONER

## 2025-03-14 PROCEDURE — 97110 THERAPEUTIC EXERCISES: CPT | Mod: GP

## 2025-03-14 RX ADMIN — ROSUVASTATIN 40 MG: 20 TABLET, FILM COATED ORAL at 10:04

## 2025-03-14 RX ADMIN — PANTOPRAZOLE SODIUM 40 MG: 40 TABLET, DELAYED RELEASE ORAL at 10:04

## 2025-03-14 RX ADMIN — GABAPENTIN 100 MG: 100 CAPSULE ORAL at 15:35

## 2025-03-14 RX ADMIN — ASPIRIN 81 MG CHEWABLE TABLET 81 MG: 81 TABLET CHEWABLE at 10:03

## 2025-03-14 RX ADMIN — AMLODIPINE BESYLATE 10 MG: 10 TABLET ORAL at 10:04

## 2025-03-14 RX ADMIN — IPRATROPIUM BROMIDE AND ALBUTEROL SULFATE 3 ML: 2.5; .5 SOLUTION RESPIRATORY (INHALATION) at 13:11

## 2025-03-14 RX ADMIN — ATENOLOL 25 MG: 25 TABLET ORAL at 10:04

## 2025-03-14 RX ADMIN — LEVOTHYROXINE SODIUM 25 MCG: 0.03 TABLET ORAL at 05:06

## 2025-03-14 RX ADMIN — IPRATROPIUM BROMIDE AND ALBUTEROL SULFATE 3 ML: 2.5; .5 SOLUTION RESPIRATORY (INHALATION) at 08:12

## 2025-03-14 RX ADMIN — OSELTAMAVIR PHOSPHATE 30 MG: 30 CAPSULE ORAL at 10:04

## 2025-03-14 RX ADMIN — CITALOPRAM HYDROBROMIDE 10 MG: 10 TABLET ORAL at 10:03

## 2025-03-14 RX ADMIN — GABAPENTIN 100 MG: 100 CAPSULE ORAL at 10:04

## 2025-03-14 RX ADMIN — GUAIFENESIN 600 MG: 600 TABLET ORAL at 10:04

## 2025-03-14 ASSESSMENT — COGNITIVE AND FUNCTIONAL STATUS - GENERAL
WALKING IN HOSPITAL ROOM: A LOT
MOVING TO AND FROM BED TO CHAIR: A LOT
CLIMB 3 TO 5 STEPS WITH RAILING: TOTAL
STANDING UP FROM CHAIR USING ARMS: A LOT
MOVING FROM LYING ON BACK TO SITTING ON SIDE OF FLAT BED WITH BEDRAILS: A LOT
TURNING FROM BACK TO SIDE WHILE IN FLAT BAD: A LOT
MOBILITY SCORE: 11

## 2025-03-14 ASSESSMENT — PAIN - FUNCTIONAL ASSESSMENT
PAIN_FUNCTIONAL_ASSESSMENT: 0-10
PAIN_FUNCTIONAL_ASSESSMENT: 0-10

## 2025-03-14 ASSESSMENT — PAIN SCALES - GENERAL
PAINLEVEL_OUTOF10: 0 - NO PAIN
PAINLEVEL_OUTOF10: 0 - NO PAIN

## 2025-03-14 NOTE — CARE PLAN
Problem: Skin  Goal: Participates in plan/prevention/treatment measures  3/14/2025 1439 by Anastasiya Hedrick RN  Flowsheets (Taken 3/14/2025 1439)  Participates in plan/prevention/treatment measures:   Elevate heels   Increase activity/out of bed for meals  3/14/2025 1439 by Anastasiya Hedrick RN  Outcome: Progressing  3/14/2025 1439 by Anastasiya Hedrick RN  Outcome: Progressing  Flowsheets (Taken 3/14/2025 1439)  Participates in plan/prevention/treatment measures:   Elevate heels   Increase activity/out of bed for meals   The patient's goals for the shift include      The clinical goals for the shift include safety

## 2025-03-14 NOTE — PROGRESS NOTES
Mariaelena Tavarez is a 89 y.o. female on day 3 of admission presenting with Influenza A virus present.      Subjective   No acute issues. Pt discharged yesterday and awaiting precert to SNF.        Objective     Last Recorded Vitals  BP (!) 119/48 (BP Location: Left arm, Patient Position: Lying)   Pulse 65   Temp 36.9 °C (98.4 °F) (Temporal)   Resp 16   Wt (!) 39.4 kg (86 lb 14.4 oz)   SpO2 94%   Intake/Output last 3 Shifts:  No intake or output data in the 24 hours ending 03/14/25 0833    Admission Weight  Weight: (!) 43.6 kg (96 lb 1.9 oz) (03/11/25 1922)    Daily Weight  03/12/25 : (!) 39.4 kg (86 lb 14.4 oz)    Image Results      Physical Exam  HENT:      Head: Normocephalic and atraumatic.      Nose: Nose normal.      Mouth/Throat:      Mouth: Mucous membranes are moist.      Pharynx: Oropharynx is clear.   Eyes:      Extraocular Movements: Extraocular movements intact.      Pupils: Pupils are equal, round, and reactive to light.   Cardiovascular:      Rate and Rhythm: Normal rate and regular rhythm.      Pulses: Normal pulses.   Pulmonary:      Effort: Pulmonary effort is normal.      Breath sounds: Normal breath sounds.   Abdominal:      General: Abdomen is flat. Bowel sounds are normal.      Palpations: Abdomen is soft.   Musculoskeletal:         General: Normal range of motion.      Comments: Generalized weakness   Skin:     General: Skin is warm and dry.      Capillary Refill: Capillary refill takes less than 2 seconds.   Neurological:      General: No focal deficit present.      Mental Status: She is oriented to person, place, and time.   Psychiatric:         Mood and Affect: Mood normal.         Relevant Results  No results found. However, due to the size of the patient record, not all encounters were searched. Please check Results Review for a complete set of results.             Assessment & Plan  Influenza A virus present    Anxiety    Memory loss    Coronary artery disease    Gastroesophageal reflux  disease    Hyperlipidemia    Hypertension    Hypothyroid    Plan    -Pt discharged yesterday and awaiting precert.     -Avoidable Day    Jannie Clarke, APRN-CNP

## 2025-03-14 NOTE — PROGRESS NOTES
03/14/25 1333   Discharge Planning   Living Arrangements Other (Comment)  (assisted living)   Support Systems Children   Type of Residence Assisted living   Care Facility Name [facility]   Who is requesting discharge planning? Provider   Home or Post Acute Services Post acute facilities (Rehab/SNF/etc)   Type of Post Acute Facility Services Skilled nursing   Expected Discharge Disposition SNF  (Accepted at [facility]/skilled. Pending precet)   Does the patient need discharge transport arranged? Yes   RoundTrip coordination needed? Yes   Has discharge transport been arranged? No     [facility] accepted on the memory care unit. Precert started 3/13  Precert approved. Transport via [facility] due to dementia and falls risk.

## 2025-03-14 NOTE — PROGRESS NOTES
Physical Therapy    Physical Therapy Treatment    Patient Name: Mariaelena Tavarez  MRN: [id number]  Department: [address]  Room: [address] [place], [place] [address]  Today's Date: 3/14/2025  Time Calculation  Start Time: 1410  Stop Time: 1450  Time Calculation (min): 40 min         Assessment/Plan   PT Assessment  PT Assessment Results: Decreased strength, Decreased endurance, Impaired balance, Decreased mobility, Decreased coordination, Decreased cognition, Impaired judgement, Decreased safety awareness, Decreased skin integrity  Rehab Prognosis: Good  Barriers to Discharge Home: Physical needs, Cognition needs  Cognition Needs: 24hr supervision for safety awareness needed  Physical Needs: 24hr mobility assistance needed, High falls risk due to function or environment  Evaluation/Treatment Tolerance: Patient limited by fatigue  Assessment Comment: Dtr present, mom doing better.  Transfer to SNF today.  Improved Transfers, gait.  Still high Fall Risk.  Plan to get home to [place] afterwards.  Dtr pleased with progress.  End of Session Patient Position: Bed, 3 rail up, Alarm on     PT Plan  Treatment/Interventions: Bed mobility, Transfer training, Gait training, Range of motion, Therapeutic exercise  PT Plan: Ongoing PT  PT Frequency: 3 times per week  PT Discharge Recommendations: Moderate intensity level of continued care  Equipment Recommended upon Discharge: Wheeled walker  PT Recommended Transfer Status: Assist x2, Assistive device  PT - OK to Discharge: Yes      General Visit Information:   PT  Visit  PT Received On: 03/14/25  Response to Previous Treatment:  (Dtr at bedside.  Mom doing better today.  Planned Discharge to [facility] then [facility])  General  Reason for Referral: impaired mobility; FLU A  Referred By: May Liang CNP  Past Medical History Relevant to Rehab: dementia, CAD, anxiety, multiple falls, OA, HTN, HLD  Preferred Learning Style: verbal, visual  General Comment: 88 y/o female who presented to ED from facility due to fever.  "Admitted with influenza. Pt sitting on EOB with OT at bedside,  upon PT arrival. Agreeable to participate. MAYANK english.Pt demos resting Tremors UEs.  Dtr at bedside.  Both patient and dtr want PT today.  Just finished lunch, she needed help from dtr to feed.    Subjective   Precautions:  Precautions  Medical Precautions: Fall precautions, Infection precautions  Precautions Comment: Droplet isolation for FLU A      Objective   Pain:  Pain Assessment  Pain Assessment: 0-10  0-10 (Numeric) Pain Score: 0 - No pain  Cognition:  Cognition  Overall Cognitive Status: Impaired at baseline  Cognition Comments: Dtr reports much improved from yesterday.  More verbal, eating better.  More like \"Mom\"  Processing Speed: Delayed  Coordination:  Coordination Comment: baseline tremors, UEs  Postural Control:  Postural Control  Righting Reactions: Poor  Protective Responses: Poor  Posture Comment: Standing tending to List Left.  Retro pulsive with CHIDI LE changes.  Static Sitting Balance  Static Sitting-Comment/Number of Minutes: Sat EOB x 2-3 mins feet dangling.  Bed height at lowest.  Dynamic Standing Balance  Dynamic Standing-Balance Support: Bilateral upper extremity supported  Dynamic Standing-Level of Assistance: Minimum assistance, Moderate assistance (x2)  Dynamic Standing-Balance: Turning (Very Unsteady Scizzors)  Dynamic Standing-Comments: Pt scizzors BLE but Left more than right.  Visual cues needed for Wider CHIDI  Extremity/Trunk Assessments:  RLE   RLE : Within Functional Limits  LLE   LLE : Within Functional Limits  Activity Tolerance:     Treatments:  Therapeutic Exercise  Therapeutic Exercise Activity 1: Sat EOB 2-3 mins  Therapeutic Exercise Activity 2: LAQ alternating 2x10  Therapeutic Exercise Activity 3: Supine Heelslides 2x10  Therapeutic Exercise Activity 4: Supine SLR 1x10 BLE    Therapeutic Activity  Therapeutic Activity 1: Static Stand Mod assist x1 or Min assistx2 (Retro LOB)    Bed Mobility 1  Bed Mobility " 1: Supine to sitting  Level of Assistance 1: Moderate assistance, Maximum assistance  Bed Mobility Comments 1: Pt initited LEs toward EOB but needed mod/max assist pull of draw sheet/pad to scoot Hips toward EOB. Sat EOB 2-3 mins for ther ex's    Ambulation/Gait Training  Ambulation/Gait Training Performed: Yes  Ambulation/Gait Training 1  Surface 1: Level tile  Device 1: Rolling walker  Gait Support Devices: Gait belt, Wheelchair follow (Bedside chair pulled up)  Assistance 1: Minimum assistance, Moderate assistance  Quality of Gait 1: Scissoring, Listing, Forward flexed posture  Comments/Distance (ft) 1: 5 feet Initially listing left, then scizzoring Left>Right.  Visual targets for BLE to attain wider CHIDI. Bedside Chair pulled up. (Dtr present and assisted also)  Ambulation/Gait Training 2  Surface 2: Level tile  Device 2: Rolling walker  Gait Support Devices: Wheelchair follow, Gait belt  Assistance 2: Minimum assistance, Moderate assistance (x2)  Quality of Gait 2: Narrow base of support, Scissoring, Forward flexed posture  Comments/Distance (ft) 2: Improved distance 10 feet chair pulled up.  Straight path only  Ambulation/Gait Training 3  Surface 3: Level tile  Device 3: Rolling walker  Gait Support Devices: Gait belt  Assistance 3: Moderate assistance  Quality of Gait 3: Scissoring, Forward flexed posture  Comments/Distance (ft) 3: Bedside chair placed next to bed.  Right side closer.  Trnasition STS with LUE hand on RW due to shoulder RTC tear per dtr.  Mod assist Sit to stand.  Mod assist for turn leading Right and step by step cues for foot placment and full back up to bed.  5-6 steps total  Transfer 1  Transfer From 1: Bed to  Transfer to 1: Stand  Technique 1: Sit to stand  Transfer Device 1: Walker  Transfer Level of Assistance 1: Moderate assistance, +1 to manage equipment  Trials/Comments 1: Bed Elevated Retropulsive.  LUE hand on RW  Transfers 2  Transfer From 2: Chair without arms to  Transfer to 2:  Bed  Technique 2: Stand pivot  Transfer Device 2: Walker  Transfer Level of Assistance 2: Moderate assistance, +2  Trials/Comments 2: 5-6 steps, very unsteady.  Mod assist to stabilize.     Outcome Measures:  [facility] Basic Mobility  Turning from your back to your side while in a flat bed without using bedrails: A lot  Moving from lying on your back to sitting on the side of a flat bed without using bedrails: A lot  Moving to and from bed to chair (including a wheelchair): A lot  Standing up from a chair using your arms (e.g. wheelchair or bedside chair): A lot  To walk in hospital room: A lot  Climbing 3-5 steps with railing: Total  Basic Mobility - Total Score: 11    Education Documentation  No documentation found.  Education Comments  No comments found.        OP EDUCATION:       Encounter Problems       Encounter Problems (Active)       Balance       sitting (Progressing)       Start:  03/12/25    Expected End:  03/26/25       Pt will sit on EOB with upright posture and supervision while performing activities, no LOB         standing (Progressing)       Start:  03/12/25    Expected End:  03/26/25       Pt will stand with UE support of walker and CGA, no LOB, for 2 minutes            Mobility       bed mobility (Progressing)       Start:  03/12/25    Expected End:  03/26/25       Pt will perform sup to/from sit transfer with supervision          ambulation (Progressing)       Start:  03/12/25    Expected End:  03/26/25       Pt will amb > 30  ft with wheeled walker and min assist            PT Transfers       sit to stand (Progressing)       Start:  03/12/25    Expected End:  03/26/25       Pt will perform sit to stand transfer with walker and CGA         bed to chair (Progressing)       Start:  03/12/25    Expected End:  03/26/25       Pt will perform bed to chair transfer with walker and CGA            Pain - Adult          Safety       LTG - Patient will utilize safety techniques (Progressing)       Start:   03/12/25    Expected End:  03/26/25

## 2025-03-14 NOTE — CARE PLAN
The patient's goals for the shift include  plan for discharge   Problem: Discharge Planning  Goal: Discharge to home or other facility with appropriate resources  Outcome: Progressing       The clinical goals for the shift include safety

## 2025-03-14 NOTE — DOCUMENTATION CLARIFICATION NOTE
PATIENT:               HI VAIL  ACCT #:                  [id number]  MRN:                       [id number]  :                       1935  ADMIT DATE:       3/11/2025 7:13 PM  DISCH DATE:  RESPONDING PROVIDER #:        [id number]          PROVIDER RESPONSE TEXT:    Toxic encephalopathy 2/2 Influenza A with Hypoxia    CDI QUERY TEXT:    Clarification    Instruction:    Based on your assessment of the patient and the clinical information, please provide the requested documentation by clicking on the appropriate radio button and enter any additional information if prompted.    Question: Please further clarify the most likely etiology of the altered mental status as    When answering this query, please exercise your independent professional judgment. The fact that a question is being asked, does not imply that any particular answer is desired or expected.    The patient's clinical indicators include:  Clinical Information: 89y.o. F presents to ED with fever, confusion & lethargy. Per H&P, admitted with Influenza A, hypoxia & Memory loss.    3/11 ED Provider: Influenza A virus present, Fever, Hypoxia. LUNGS: Expiratory wheezing in bilateral lung fields with mild decreased chest excursion, + mild increased work of breathing without any respiratory distress.    3/11 H&P: Patient oriented to person only and is poor historian in the setting of illness and dementia. She is lethargic and disoriented. Influenza A virus present, Hypoxic on arrival. Anxiety, continue escitalopram. Memory loss, continue donepezil.    3/12 Progress Note: She is alert. She is confused. Influenza A virus present. Memory loss    3/13 D/C Summary: Pt with Dementia, admitted for weakness secondary to Influenza A. Pt was treated with Tamiflu and gentle oxygen. She has been weaned to RA and has clinically improved. Due to continued weakness the daughter has requested SNF placement.    Clinical Indicators:  3/11 Influenza A: Detected  3/11 RBC  4.22, WBC 5.4, Plts 197, K 3.5  3/12 RBC 3.95, WBC 4.6, Plts 158, K 3.4  3/13 RBC 4.25, WBC 6.2, Plts 142, K 3.5    Treatment:  3/11-3/13 Aricept 10mg po x3 doses  3/12-3/13 Tamiflu 30-75mg po x4 doses    Risk Factors: Dementia, BPV, Vitamin D deficiency, multiple falls, BMI: 16.9  Options provided:  -- Metabolic encephalopathy superimposed on Dementia with Hypokalemia  -- Toxic encephalopathy 2/2 Influenza A with Hypoxia  -- Worsening dementia  -- Other - I will add my own diagnosis  -- Refer to Clinical Documentation Reviewer    Query created by: Adelso Siddiqi on 3/13/2025 9:15 PM      Electronically signed by:  ADELSO VOSS-CNP 3/13/2025 9:53 PM

## 2025-03-18 ENCOUNTER — TELEPHONE (OUTPATIENT)
Dept: PRIMARY CARE | Facility: CLINIC | Age: OVER 89
End: 2025-03-18
Payer: MEDICARE

## 2025-03-18 DIAGNOSIS — I25.10 CARDIOVASCULAR DISEASE: ICD-10-CM

## 2025-03-18 NOTE — TELEPHONE ENCOUNTER
Requesting a written signed order for Hospice services to be faxed to [phone].  Please advise.

## 2025-03-19 NOTE — TELEPHONE ENCOUNTER
Referral entered and into system, faxed to [facility] at number as provided.

## 2025-03-19 NOTE — TELEPHONE ENCOUNTER
Please send referral to [facility] per family request. We can use the Dx: Cardiovascular Disease. I'm not sure if this will be enough to qualify. She has had repeated falls with injuries. Recent hospital admission for Influenza a with hypoxia. Thank you.

## 2025-03-21 ENCOUNTER — NURSING HOME VISIT (OUTPATIENT)
Dept: POST ACUTE CARE | Facility: EXTERNAL LOCATION | Age: OVER 89
End: 2025-03-21
Payer: MEDICARE

## 2025-03-21 DIAGNOSIS — Z51.5 HOSPICE CARE PATIENT: ICD-10-CM

## 2025-03-21 DIAGNOSIS — Z78.9 LIVING IN ASSISTED LIVING: ICD-10-CM

## 2025-03-21 DIAGNOSIS — I10 PRIMARY HYPERTENSION: ICD-10-CM

## 2025-03-21 DIAGNOSIS — E03.9 ACQUIRED HYPOTHYROIDISM: Primary | ICD-10-CM

## 2025-03-21 DIAGNOSIS — F41.9 ANXIETY: ICD-10-CM

## 2025-03-21 DIAGNOSIS — E78.2 MIXED HYPERLIPIDEMIA: ICD-10-CM

## 2025-03-21 DIAGNOSIS — F01.A0 MILD VASCULAR DEMENTIA WITHOUT BEHAVIORAL DISTURBANCE, PSYCHOTIC DISTURBANCE, MOOD DISTURBANCE, OR ANXIETY: ICD-10-CM

## 2025-03-21 PROCEDURE — 99349 HOME/RES VST EST MOD MDM 40: CPT

## 2025-03-21 ASSESSMENT — PAIN SCALES - GENERAL: PAINLEVEL_OUTOF10: 0-NO PAIN

## 2025-03-21 NOTE — LETTER
Patient: Mariaelena Tavarez  : 1935    Encounter Date: 2025    Subjective  Patient ID: Mariaelena Tavarez is a 89 y.o. female who is assisted living/ home patient being seen at [facility], and evaluated for post acute care follow up.     HPI   Pt visited in apartment of assisted living. Pt was sent to ER on 3/11 for lethargy and increased confusion. Pt was diagnosed with Influenza A, admitted to hospital, treated, and was discharged to rehab for weakness. Pt now returns to the assisted living, and has since been admitted to [facility]. Pt up in recliner, comfortable and denies pain.  Pt denies fever, chills, headaches, and dizziness. Admits to dry cough leftover from flu, nursing administrating robitussin prn. Pt states appetite is good, and eating lunch in apartment.     Pt denies chest pain and sob at rest/exertion. Pt denies voiding symptoms and constipation. Pt denies sleep disturbances. Pt ambulates with rollator. Pt with frequent falls with injuries. Family had requested hospice due to falls, and slow decline. Pt with no concerns at this time.       Current Outpatient Medications on File Prior to Visit   Medication Sig Dispense Refill   • acetaminophen (TylenoL) 325 mg tablet Take 2 tablets (650 mg) by mouth 3 times a day. 180 tablet 3   • acetaminophen (Tylenol) 650 mg suppository Insert 1 suppository (650 mg) into the rectum every 6 hours if needed for mild pain (1 - 3), moderate pain (4 - 6), headaches or fever (temp greater than 38.0 C).     • amLODIPine (Norvasc) 10 mg tablet Take 1 tablet (10 mg) by mouth once daily.     • aspirin 81 mg chewable tablet Chew 1 tablet (81 mg) once daily.     • atenolol (Tenormin) 25 mg tablet Take 1 tablet (25 mg) by mouth once daily. 30 tablet 0   • bisacodyl (Dulcolax) 10 mg suppository Insert 1 suppository (10 mg) into the rectum once daily as needed for constipation.     • dextromethorphan-guaifenesin (Robitussin DM)  mg/5 mL oral liquid Take  5 mL by mouth every 4 hours if needed for cough.     • escitalopram (Lexapro) 5 mg tablet 1 tablet a day 90 tablet 3   • gabapentin (Neurontin) 100 mg capsule Take 1 capsule (100 mg) by mouth 3 times a day. 90 capsule 5   • hyoscyamine (Anaspaz) 0.125 mg disintegrating tablet Dissolve 1 tablet (0.125 mg) in the mouth every 2 hours if needed (secretions).     • loperamide (Imodium A-D) 2 mg tablet Take 1 tablet (2 mg) by mouth 4 times a day as needed for diarrhea.     • LORazepam (Ativan) 0.5 mg tablet Take 1 tablet (0.5 mg) by mouth every 4 hours if needed for anxiety.     • magnesium hydroxide (Milk of Magnesia) 400 mg/5 mL suspension Take 30 mL by mouth once daily as needed for constipation.     • morphine 20 mg/mL concentrated oral solution Take 0.25 mL (5 mg) by mouth every 4 hours if needed for severe pain (7 - 10) or shortness of breath.     • omeprazole (PriLOSEC) 20 mg DR capsule Take 1 capsule (20 mg) by mouth once daily in the morning. Take before meals. Do not crush or chew.     • prochlorperazine (Compazine) 10 mg tablet Take 1 tablet (10 mg) by mouth every 6 hours if needed for nausea or vomiting.     • donepezil (Aricept) 10 mg tablet TAKE ONE TABLET BY MOUTH DAILY AT BEDTIME AS DIRECTED (Patient not taking: Reported on 3/24/2025) 90 tablet 1   • levothyroxine (Synthroid, Levoxyl) 25 mcg tablet Take 1 tablet (25 mcg) by mouth once daily in the morning. Take before meals. . for 90 (Patient not taking: Reported on 3/24/2025) 90 tablet 3   • pantoprazole (ProtoNix) 40 mg EC tablet Take 1 tablet (40 mg) by mouth once daily. (Patient not taking: Reported on 3/24/2025) 90 tablet 3   • rosuvastatin (Crestor) 40 mg tablet Take 1 tablet (40 mg) by mouth once daily. (Patient not taking: Reported on 3/24/2025) 90 tablet 3   • [DISCONTINUED] oseltamivir (Tamiflu) 30 mg capsule Take 1 capsule (30 mg) by mouth 2 times a day for 7 doses. 7 capsule 0     No current facility-administered medications on file prior to  visit.        Review of Systems   Constitutional:  Negative for activity change, appetite change, chills, fatigue and fever.   HENT:  Negative for congestion, hearing loss and rhinorrhea.    Eyes:  Positive for visual disturbance.        Glasses   Respiratory:  Negative for cough and shortness of breath.    Cardiovascular:  Negative for chest pain, palpitations and leg swelling.   Gastrointestinal:  Negative for constipation, diarrhea, nausea and vomiting.   Genitourinary:  Negative for dysuria and frequency.   Musculoskeletal:  Positive for arthralgias and gait problem.        Utilizing wheelchair and rollator for mobility.    Neurological:  Positive for weakness. Negative for dizziness, light-headedness and headaches.   Psychiatric/Behavioral:  Negative for sleep disturbance. The patient is not nervous/anxious.        Objective  /84   Pulse 74   Resp 16   SpO2 99%     Physical Exam  Constitutional:       General: She is awake. She is not in acute distress.     Appearance: Normal appearance. She is not ill-appearing.   HENT:      Head: Normocephalic.      Nose: No congestion or rhinorrhea.      Mouth/Throat:      Mouth: Mucous membranes are moist.   Eyes:      Conjunctiva/sclera: Conjunctivae normal.      Pupils: Pupils are equal, round, and reactive to light.   Cardiovascular:      Rate and Rhythm: Normal rate and regular rhythm.      Pulses: Normal pulses.      Heart sounds: Normal heart sounds.   Pulmonary:      Effort: Pulmonary effort is normal. No respiratory distress.      Breath sounds: Normal breath sounds. No wheezing or rhonchi.   Abdominal:      General: Bowel sounds are normal.      Palpations: Abdomen is soft.   Musculoskeletal:         General: Normal range of motion.      Cervical back: Normal range of motion.      Right lower leg: No edema.      Left lower leg: No edema.   Skin:     General: Skin is warm.      Capillary Refill: Capillary refill takes less than 2 seconds.   Neurological:       General: No focal deficit present.      Mental Status: She is alert. Mental status is at baseline.      Comments: Oriented x1-2, confused to month and place.    Psychiatric:         Attention and Perception: Attention normal.         Mood and Affect: Mood normal.         Speech: Speech normal.         Behavior: Behavior normal. Behavior is cooperative.         Thought Content: Thought content normal.         Cognition and Memory: Cognition and memory normal. Cognition is not impaired. Memory is not impaired.         Judgment: Judgment normal.         Assessment/Plan  Diagnoses and all orders for this visit:  Acquired hypothyroidism  Comments:  Levothyroxine discontinued by hospice. Unable to determine reason. Pt taking medication without difficulty.  Primary hypertension  Comments:  Blood pressure controlled.  Mixed hyperlipidemia  Comments:  rosuvastatin discontinued by hospice.  Mild vascular dementia without behavioral disturbance, psychotic disturbance, mood disturbance, or anxiety  Comments:  Pt appropriate for secured memory care unit.  Anxiety  Comments:  Pt previously followed [facility] NP, now pt is on hospice who will manage symptoms.  Living in assisted living  Comments:  Medical and facility chart reviewed, medication reconciled and collaboration with nursing.  Hospice care patient  Comments:  Continue to follow the goals of hospice care.            Electronically Signed By: DESTINEE Bhatia   3/24/25  2:28 PM

## 2025-03-24 VITALS
OXYGEN SATURATION: 99 % | HEART RATE: 74 BPM | DIASTOLIC BLOOD PRESSURE: 84 MMHG | SYSTOLIC BLOOD PRESSURE: 110 MMHG | RESPIRATION RATE: 16 BRPM

## 2025-03-24 RX ORDER — LORAZEPAM 0.5 MG/1
0.5 TABLET ORAL EVERY 4 HOURS PRN
COMMUNITY

## 2025-03-24 RX ORDER — ACETAMINOPHEN 650 MG/1
650 SUPPOSITORY RECTAL EVERY 6 HOURS PRN
COMMUNITY

## 2025-03-24 RX ORDER — OMEPRAZOLE 20 MG/1
20 CAPSULE, DELAYED RELEASE ORAL
COMMUNITY

## 2025-03-24 RX ORDER — PROCHLORPERAZINE MALEATE 10 MG
10 TABLET ORAL EVERY 6 HOURS PRN
COMMUNITY

## 2025-03-24 RX ORDER — HYOSCYAMINE SULFATE 0.12 MG/1
0.12 TABLET, ORALLY DISINTEGRATING ORAL EVERY 2 HOUR PRN
COMMUNITY

## 2025-03-24 RX ORDER — LOPERAMIDE HCL 2 MG
2 TABLET ORAL 4 TIMES DAILY PRN
COMMUNITY

## 2025-03-24 RX ORDER — ADHESIVE BANDAGE
30 BANDAGE TOPICAL DAILY PRN
COMMUNITY

## 2025-03-24 RX ORDER — GUAIFENESIN/DEXTROMETHORPHAN 100-10MG/5
5 SYRUP ORAL EVERY 4 HOURS PRN
COMMUNITY

## 2025-03-24 RX ORDER — MORPHINE SULFATE 20 MG/ML
5 SOLUTION ORAL EVERY 4 HOURS PRN
COMMUNITY

## 2025-03-24 RX ORDER — BISACODYL 10 MG/1
10 SUPPOSITORY RECTAL DAILY PRN
COMMUNITY

## 2025-03-24 ASSESSMENT — ENCOUNTER SYMPTOMS
PALPITATIONS: 0
CONSTIPATION: 0
DIARRHEA: 0
DIZZINESS: 0
LIGHT-HEADEDNESS: 0
FATIGUE: 0
SLEEP DISTURBANCE: 0
VOMITING: 0
COUGH: 0
CHILLS: 0
APPETITE CHANGE: 0
ARTHRALGIAS: 1
FEVER: 0
FREQUENCY: 0
HEADACHES: 0
ACTIVITY CHANGE: 0
NAUSEA: 0
DYSURIA: 0
WEAKNESS: 1
NERVOUS/ANXIOUS: 0
SHORTNESS OF BREATH: 0
RHINORRHEA: 0

## 2025-03-24 NOTE — PROGRESS NOTES
Subjective   Patient ID: Mariaelena Tavarez is a 89 y.o. female who is assisted living/ home patient being seen at [facility], and evaluated for post acute care follow up.     HPI   Pt visited in apartment of assisted living. Pt was sent to ER on 3/11 for lethargy and increased confusion. Pt was diagnosed with Influenza A, admitted to hospital, treated, and was discharged to rehab for weakness. Pt now returns to the assisted living, and has since been admitted to [facility]. Pt up in recliner, comfortable and denies pain.  Pt denies fever, chills, headaches, and dizziness. Admits to dry cough leftover from flu, nursing administrating robitussin prn. Pt states appetite is good, and eating lunch in apartment.     Pt denies chest pain and sob at rest/exertion. Pt denies voiding symptoms and constipation. Pt denies sleep disturbances. Pt ambulates with rollator. Pt with frequent falls with injuries. Family had requested hospice due to falls, and slow decline. Pt with no concerns at this time.       Current Outpatient Medications on File Prior to Visit   Medication Sig Dispense Refill    acetaminophen (TylenoL) 325 mg tablet Take 2 tablets (650 mg) by mouth 3 times a day. 180 tablet 3    acetaminophen (Tylenol) 650 mg suppository Insert 1 suppository (650 mg) into the rectum every 6 hours if needed for mild pain (1 - 3), moderate pain (4 - 6), headaches or fever (temp greater than 38.0 C).      amLODIPine (Norvasc) 10 mg tablet Take 1 tablet (10 mg) by mouth once daily.      aspirin 81 mg chewable tablet Chew 1 tablet (81 mg) once daily.      atenolol (Tenormin) 25 mg tablet Take 1 tablet (25 mg) by mouth once daily. 30 tablet 0    bisacodyl (Dulcolax) 10 mg suppository Insert 1 suppository (10 mg) into the rectum once daily as needed for constipation.      dextromethorphan-guaifenesin (Robitussin DM)  mg/5 mL oral liquid Take 5 mL by mouth every 4 hours if needed for cough.      escitalopram (Lexapro)  5 mg tablet 1 tablet a day 90 tablet 3    gabapentin (Neurontin) 100 mg capsule Take 1 capsule (100 mg) by mouth 3 times a day. 90 capsule 5    hyoscyamine (Anaspaz) 0.125 mg disintegrating tablet Dissolve 1 tablet (0.125 mg) in the mouth every 2 hours if needed (secretions).      loperamide (Imodium A-D) 2 mg tablet Take 1 tablet (2 mg) by mouth 4 times a day as needed for diarrhea.      LORazepam (Ativan) 0.5 mg tablet Take 1 tablet (0.5 mg) by mouth every 4 hours if needed for anxiety.      magnesium hydroxide (Milk of Magnesia) 400 mg/5 mL suspension Take 30 mL by mouth once daily as needed for constipation.      morphine 20 mg/mL concentrated oral solution Take 0.25 mL (5 mg) by mouth every 4 hours if needed for severe pain (7 - 10) or shortness of breath.      omeprazole (PriLOSEC) 20 mg DR capsule Take 1 capsule (20 mg) by mouth once daily in the morning. Take before meals. Do not crush or chew.      prochlorperazine (Compazine) 10 mg tablet Take 1 tablet (10 mg) by mouth every 6 hours if needed for nausea or vomiting.      donepezil (Aricept) 10 mg tablet TAKE ONE TABLET BY MOUTH DAILY AT BEDTIME AS DIRECTED (Patient not taking: Reported on 3/24/2025) 90 tablet 1    levothyroxine (Synthroid, Levoxyl) 25 mcg tablet Take 1 tablet (25 mcg) by mouth once daily in the morning. Take before meals. . for 90 (Patient not taking: Reported on 3/24/2025) 90 tablet 3    pantoprazole (ProtoNix) 40 mg EC tablet Take 1 tablet (40 mg) by mouth once daily. (Patient not taking: Reported on 3/24/2025) 90 tablet 3    rosuvastatin (Crestor) 40 mg tablet Take 1 tablet (40 mg) by mouth once daily. (Patient not taking: Reported on 3/24/2025) 90 tablet 3    [DISCONTINUED] oseltamivir (Tamiflu) 30 mg capsule Take 1 capsule (30 mg) by mouth 2 times a day for 7 doses. 7 capsule 0     No current facility-administered medications on file prior to visit.        Review of Systems   Constitutional:  Negative for activity change, appetite  change, chills, fatigue and fever.   HENT:  Negative for congestion, hearing loss and rhinorrhea.    Eyes:  Positive for visual disturbance.        Glasses   Respiratory:  Negative for cough and shortness of breath.    Cardiovascular:  Negative for chest pain, palpitations and leg swelling.   Gastrointestinal:  Negative for constipation, diarrhea, nausea and vomiting.   Genitourinary:  Negative for dysuria and frequency.   Musculoskeletal:  Positive for arthralgias and gait problem.        Utilizing wheelchair and rollator for mobility.    Neurological:  Positive for weakness. Negative for dizziness, light-headedness and headaches.   Psychiatric/Behavioral:  Negative for sleep disturbance. The patient is not nervous/anxious.        Objective   /84   Pulse 74   Resp 16   SpO2 99%     Lab Results   Component Value Date    GLUCOSE 96 03/13/2025    CALCIUM 9.7 03/13/2025     03/13/2025    K 3.5 03/13/2025    CO2 28 03/13/2025     03/13/2025    BUN 13 03/13/2025    CREATININE 0.80 03/13/2025      Lab Results   Component Value Date    WBC 6.2 03/13/2025    HGB 13.0 03/13/2025    HCT 38.2 03/13/2025    MCV 90 03/13/2025     (L) 03/13/2025        Chemistry    Lab Results   Component Value Date/Time     03/13/2025 0436    K 3.5 03/13/2025 0436     03/13/2025 0436    CO2 28 03/13/2025 0436    BUN 13 03/13/2025 0436    CREATININE 0.80 03/13/2025 0436    Lab Results   Component Value Date/Time    CALCIUM 9.7 03/13/2025 0436    ALKPHOS 114 03/11/2025 2117    AST 27 03/11/2025 2117    ALT 22 03/11/2025 2117    BILITOT 0.9 03/11/2025 2117         Physical Exam  Constitutional:       General: She is awake. She is not in acute distress.     Appearance: Normal appearance. She is not ill-appearing.   HENT:      Head: Normocephalic.      Nose: No congestion or rhinorrhea.      Mouth/Throat:      Mouth: Mucous membranes are moist.   Eyes:      Conjunctiva/sclera: Conjunctivae normal.      Pupils:  Pupils are equal, round, and reactive to light.   Cardiovascular:      Rate and Rhythm: Normal rate and regular rhythm.      Pulses: Normal pulses.      Heart sounds: Normal heart sounds.   Pulmonary:      Effort: Pulmonary effort is normal. No respiratory distress.      Breath sounds: Normal breath sounds. No wheezing or rhonchi.   Abdominal:      General: Bowel sounds are normal.      Palpations: Abdomen is soft.   Musculoskeletal:         General: Normal range of motion.      Cervical back: Normal range of motion.      Right lower leg: No edema.      Left lower leg: No edema.   Skin:     General: Skin is warm.      Capillary Refill: Capillary refill takes less than 2 seconds.   Neurological:      General: No focal deficit present.      Mental Status: She is alert. Mental status is at baseline.      Comments: Oriented x1-2, confused to month and place.    Psychiatric:         Attention and Perception: Attention normal.         Mood and Affect: Mood normal.         Speech: Speech normal.         Behavior: Behavior normal. Behavior is cooperative.         Thought Content: Thought content normal.         Cognition and Memory: Cognition and memory normal. Cognition is not impaired. Memory is not impaired.         Judgment: Judgment normal.         Assessment/Plan   Diagnoses and all orders for this visit:  Acquired hypothyroidism  Comments:  Levothyroxine discontinued by hospice. Unable to determine reason. Pt taking medication without difficulty.  Primary hypertension  Comments:  Blood pressure controlled.  Mixed hyperlipidemia  Comments:  rosuvastatin discontinued by hospice.  Mild vascular dementia without behavioral disturbance, psychotic disturbance, mood disturbance, or anxiety  Comments:  Pt appropriate for secured memory care unit.  Anxiety  Comments:  Pt previously followed [facility] NP, now pt is on hospice who will manage symptoms.  Living in assisted living  Comments:  Medical and facility chart  reviewed, medication reconciled and collaboration with nursing.  Hospice care patient  Comments:  Continue to follow the goals of hospice care.

## 2025-04-05 ENCOUNTER — HOSPITAL ENCOUNTER (EMERGENCY)
Facility: HOSPITAL | Age: OVER 89
Discharge: HOME | End: 2025-04-05
Payer: MEDICARE

## 2025-04-05 VITALS
HEART RATE: 74 BPM | WEIGHT: 87 LBS | HEIGHT: 60 IN | OXYGEN SATURATION: 95 % | BODY MASS INDEX: 17.08 KG/M2 | DIASTOLIC BLOOD PRESSURE: 68 MMHG | TEMPERATURE: 98.2 F | SYSTOLIC BLOOD PRESSURE: 138 MMHG | RESPIRATION RATE: 17 BRPM

## 2025-04-05 DIAGNOSIS — R29.6 UNWITNESSED FALL: ICD-10-CM

## 2025-04-05 DIAGNOSIS — S09.90XA CLOSED HEAD INJURY, INITIAL ENCOUNTER: Primary | ICD-10-CM

## 2025-04-05 DIAGNOSIS — S01.81XA FACIAL LACERATION, INITIAL ENCOUNTER: ICD-10-CM

## 2025-04-05 PROCEDURE — 90715 TDAP VACCINE 7 YRS/> IM: CPT | Performed by: CLINICAL NURSE SPECIALIST

## 2025-04-05 PROCEDURE — 90471 IMMUNIZATION ADMIN: CPT | Performed by: CLINICAL NURSE SPECIALIST

## 2025-04-05 PROCEDURE — 12013 RPR F/E/E/N/L/M 2.6-5.0 CM: CPT | Performed by: CLINICAL NURSE SPECIALIST

## 2025-04-05 PROCEDURE — 2500000004 HC RX 250 GENERAL PHARMACY W/ HCPCS (ALT 636 FOR OP/ED): Performed by: CLINICAL NURSE SPECIALIST

## 2025-04-05 PROCEDURE — 99283 EMERGENCY DEPT VISIT LOW MDM: CPT

## 2025-04-05 RX ORDER — LIDOCAINE HYDROCHLORIDE 10 MG/ML
5 INJECTION, SOLUTION INFILTRATION; PERINEURAL ONCE
Status: COMPLETED | OUTPATIENT
Start: 2025-04-05 | End: 2025-04-05

## 2025-04-05 RX ADMIN — LIDOCAINE HYDROCHLORIDE 5 ML: 10 INJECTION, SOLUTION INFILTRATION; PERINEURAL at 17:51

## 2025-04-05 ASSESSMENT — PAIN - FUNCTIONAL ASSESSMENT: PAIN_FUNCTIONAL_ASSESSMENT: 0-10

## 2025-04-05 ASSESSMENT — PAIN SCALES - GENERAL: PAINLEVEL_OUTOF10: 0 - NO PAIN

## 2025-04-05 NOTE — ED PROVIDER NOTES
Department of Emergency Medicine   ED  Provider Note  Admit Date/RoomTime: 4/5/2025  5:17 PM  ED Room: 20/20        History of Present Illness:  Chief Complaint   Patient presents with    Fall     Pt to ER for eval of fall from facility. Recent UTI. Unwitnessed fall. DNRCC on hospice, family requesting nothing to be done besides laceration repair. Pt denies any pain. EMS placed dressing over skin tear to LUE. Gauze dressing placed over head.          Mariaelena Tavarez is a 89 y.o. female history of anxiety, coronary artery disease, reflux, hyperlipidemia, hypertension, dementia currently on hospice care presents to the emergency department with complaints of laceration to the left side of the face after unwitnessed fall.  Patient is currently on hospice and feel he would only like suture repair performed to the left face.  Offered tetanus shot has declined.  He is also noted the patient has a skin tear of the left upper extremity feel reports that this is old and they have been monitoring it at home.  Family states patient falls frequently hospice nurses coming to visit the patient today upon discharge patient has no complaints no difficulty moving arms or legs.  Does complain of tenderness in the right upper arm bruise noted    Review of Systems:   Pertinent positives and negatives are stated within HPI, all other systems reviewed and are negative.        --------------------------------------------- PAST HISTORY ---------------------------------------------  Past Medical History:  has a past medical history of Age-related nuclear cataract of left eye (09/03/2023), Anxiety (09/03/2023), Benign paroxysmal positional vertigo (09/03/2023), Coronary artery disease (09/03/2023), Gastroesophageal reflux disease (09/03/2023), Hip dislocation, right, initial encounter (Multi) (08/23/2024), Hyperlipidemia (09/03/2023), Osteoarthritis (09/03/2023), Unspecified cataract, Urinary incontinence (09/03/2023), Venous thrombosis  (09/03/2023), and Vitamin D deficiency (09/03/2023).  Past Surgical History:  has a past surgical history that includes Cataract extraction (09/04/2018) and CT guided imaging for needle placement (9/27/2016).  Social History:  reports that she has never smoked. She has never used smokeless tobacco. She reports that she does not drink alcohol and does not use drugs.  Family History: family history includes Arthritis in her father; Cancer in her maternal grandfather; Dementia in her sister; Heart disease in an other family member; Hypertension in her mother, sibling, and another family member; Parkinsonism in her sister; Stroke in her mother, sibling, and another family member.. Unless otherwise noted, family history is non contributory  The patient’s home medications have been reviewed.  Allergies: Simvastatin, Iodinated contrast media, Lisinopril, and Sulfa (sulfonamide antibiotics)        ---------------------------------------------------PHYSICAL EXAM--------------------------------------    GENERAL APPEARANCE: Awake and alert.  Confused  VITAL SIGNS: As per the nurses' triage record.   HEENT: Normocephalic, gaping laceration over the lateral aspect of the forehead around the eye.  No raccoon eyes or xavier signs noted.  No epistaxis noted.  No bite to the tongue or lip.  Extraocular muscles are intact. Pupils equal round and reactive to light. Conjunctiva are pink. Negative scleral icterus. Mucous membranes are moist. Tongue in the midline. Pharynx was without erythema or exudates, uvula midline  NECK: Soft Nontender and supple, full gross ROM, no meningeal signs.  No pain with palpation full range of motion  CHEST: Nontender to palpation. Clear to auscultation bilaterally. No rales, rhonchi, or wheezing.  No flail chest  HEART: S1, S2. Regular rate and rhythm. No murmurs, gallops or rubs.  Strong and equal pulses in the extremities.   ABDOMEN: Soft, nontender, nondistended, positive bowel sounds, no palpable  masses.  MUSCULCSKELETAL:   Bruising noted to the upper right arm.  Firm.  No redness or warmth.  Full range of motion of the elbow and hand.  Left upper extremity full range of motion patient has a dressing that is intact.  Bilateral lower extremities moves with no difficulty peripheral pulses intact.  No abrasions or lacerations noted to the distal legs.  Peripheral pulses intact    NEUROLOGICAL: Awake, alert confused. Power intact in the upper and lower extremities. Sensation is intact to light touch in the upper and lower extremities.   IMMUNOLOGICAL: No lymphatic streaking noted   DERM: Pink warm and dry          ------------------------- NURSING NOTES AND VITALS REVIEWED ---------------------------  The nursing notes within the ED encounter and vital signs as below have been reviewed by myself  /68   Pulse 74   Temp 36.8 °C (98.2 °F)   Resp 17   Ht 1.524 m (5')   Wt (!) 39.5 kg (87 lb)   SpO2 95%   BMI 16.99 kg/m²     Oxygen Saturation Interpretation: 95% room air      The patient’s available past medical records and past encounters were reviewed.          -----------------------DIAGNOSTIC RESULTS------------------------  LABS:    Labs Reviewed - No data to display    As interpreted by me, the above displayed labs are abnormal. All other labs obtained during this visit were within normal range or not returned as of this dictation.    ------------------------------ ED COURSE/MEDICAL DECISION MAKING----------------------  Medical Decision Making:   Exam: A medically appropriate exam performed, outlined above, given the known history and presentation.    History obtained from: Review of medical records, family nursing notes      Social Determinants of Health considered during this visit: Currently under hospice care      PAST MEDICAL HISTORY/Chronic Conditions Affecting Care     has a past medical history of Age-related nuclear cataract of left eye (09/03/2023), Anxiety (09/03/2023), Benign  paroxysmal positional vertigo (09/03/2023), Coronary artery disease (09/03/2023), Gastroesophageal reflux disease (09/03/2023), Hip dislocation, right, initial encounter (Multi) (08/23/2024), Hyperlipidemia (09/03/2023), Osteoarthritis (09/03/2023), Unspecified cataract, Urinary incontinence (09/03/2023), Venous thrombosis (09/03/2023), and Vitamin D deficiency (09/03/2023).       CC/HPI Summary, Social Determinants of health, Records Reviewed, DDx, testing done/not done, ED Course, Reassessment, disposition considerations/shared decision making with patient, consults, disposition:   Presents with laceration to the left side of the face after fall  Plan  Tetanus family has refused  Lidocaine  Suture repair  Patient is currently on hospice care family does not want any CTs or laboratory data or testing performed.  Would only like laceration repair.  On clinical exam patient does have a gaping laceration which require suture repair to the left forehead around the eye.  No raccoon eyes or xavier signs noted.  She remains moving all extremities.  Confused at baseline.  Intracranial bleed also within the differential cervical strain for cervical fracture but due to patient's hospice status no further testing will be performed at the request of family guardian.  Tetanus shot was also offered has declined.  Patient appears to be in no pain pleasant interactive.  Based on patient's clinical presentation history and symptoms consistent with fall unwitnessed  Closed head injury CT declined hospice care  Facial laceration 7 separate sutures  Triage note patient had a left arm skin tear family reports that this is old dressings dry and intact.  Patient seen independently attending physician available for consultation as needed Case discussed with the attending physician.  Patient discharged back to her facility no acute distress  PROCEDURES  Unless otherwise noted below, none  Laceration Repair    Performed by: Elen Aguirre,  APRN-CNP  Authorized by: Pepito Garcia DO    Consent:     Consent obtained:  Verbal    Consent given by:  Patient and guardian    Risks, benefits, and alternatives were discussed: yes      Risks discussed:  Infection, pain, vascular damage, poor wound healing, poor cosmetic result, need for additional repair and nerve damage  Universal protocol:     Procedure explained and questions answered to patient or proxy's satisfaction: yes      Relevant documents present and verified: yes      Required blood products, implants, devices, and special equipment available: yes      Site/side marked: yes      Immediately prior to procedure, a time out was called: yes      Patient identity confirmed:  Arm band and verbally with patient  Anesthesia:     Anesthesia method:  Local infiltration    Local anesthetic:  Lidocaine 1% w/o epi  Laceration details:     Location:  Face    Face location:  L cheek    Length (cm):  3    Depth (mm):  5  Pre-procedure details:     Preparation:  Patient was prepped and draped in usual sterile fashion and imaging obtained to evaluate for foreign bodies  Exploration:     Hemostasis achieved with:  Direct pressure    Imaging outcome: foreign body not noted      Wound exploration: wound explored through full range of motion and entire depth of wound visualized      Contaminated: no    Treatment:     Area cleansed with:  Chlorhexidine    Amount of cleaning:  Standard    Irrigation solution:  Sterile saline    Irrigation volume:  Copious amounts of normal saline    Irrigation method:  Syringe    Debridement:  None    Undermining:  None    Scar revision: no    Skin repair:     Repair method:  Sutures    Suture size:  5-0    Suture material:  Prolene    Suture technique:  Simple interrupted    Number of sutures:  7  Approximation:     Approximation:  Close  Repair type:     Repair type:  Simple  Post-procedure details:     Dressing:  Antibiotic ointment and non-adherent dressing    Procedure completion:   Tolerated well, no immediate complications  Comments:      Patient placed in position of comfort.  Sterilely draped.  Family at bedside.  Offered tetanus shot has declined.  Wound cleansed with chlorhexidine.  Using 1% lidocaine without epinephrine approximately 1-1/2 cc was injected around the wound edges should have adequate anesthesia.  Patient able to open and shut the eye with no difficulty.  Base of the wound visualized in a bloodless field.  Patient has a crescent shaped wound around the left thigh into the cheek with a Y base.  Gaping.  No foreign body noted.  Using 5-0 Prolene 7 simple interrupted sutures were used to closely approximate the wound.  Patient tolerated well.  Bacitracin applied by nursing and dry sterile dressing.  Family advised to have sutures removed in 7-10   Triple antibiotic ointment twice a day monitor for signs and symptoms of infection return with any worsening symptoms or concerns.  Patient remains at her neurological baseline.  Able to open and shut the eye with no difficulty.       CONSULTS:   None      Diagnoses as of 04/05/25 1900   Closed head injury, initial encounter   Facial laceration, initial encounter   Unwitnessed fall         This patient has remained hemodynamically stable during their ED course.      Critical Care: none        Counseling:  The emergency provider has spoken with the patient family and discussed today’s results, in addition to providing specific details for the plan of care and counseling regarding the diagnosis and prognosis.  Questions are answered at this time and they are agreeable with the plan.         --------------------------------- IMPRESSION AND DISPOSITION ---------------------------------    IMPRESSION  1. Closed head injury, initial encounter    2. Facial laceration, initial encounter    3. Unwitnessed fall        DISPOSITION  Disposition: Discharge back to facility  Patient condition is stable improved        NOTE: This report was  transcribed using voice recognition software. Every effort was made to ensure accuracy; however, inadvertent computerized transcription errors may be present      SHANIKA Olivera-CNP  04/05/25 [id number]

## 2025-04-05 NOTE — DISCHARGE INSTRUCTIONS
Have sutures removed in 7 to 10 days.  Triple antibiotic ointment twice a day.  Monitor for signs and symptoms of infection.  Triple antibiotic ointment twice a day.  Return with any worsening symptoms or concerns.  Tetanus shot was offered declined.

## 2025-04-07 ENCOUNTER — DOCUMENTATION (OUTPATIENT)
Dept: CARE COORDINATION | Facility: CLINIC | Age: OVER 89
End: 2025-04-07
Payer: MEDICARE

## 2025-04-16 ENCOUNTER — TELEPHONE (OUTPATIENT)
Dept: PRIMARY CARE | Facility: CLINIC | Age: OVER 89
End: 2025-04-16
Payer: MEDICARE

## 2025-08-27 ASSESSMENT — PAIN SCALES - GENERAL: PAINLEVEL_OUTOF10: 0-NO PAIN

## 2025-08-28 ENCOUNTER — NURSING HOME VISIT (OUTPATIENT)
Dept: POST ACUTE CARE | Facility: EXTERNAL LOCATION | Age: OVER 89
End: 2025-08-28
Payer: MEDICARE

## 2025-08-28 DIAGNOSIS — Z78.9 LIVES IN ASSISTED LIVING FACILITY: ICD-10-CM

## 2025-08-28 DIAGNOSIS — Z51.5 HOSPICE CARE PATIENT: ICD-10-CM

## 2025-08-28 DIAGNOSIS — E03.9 ACQUIRED HYPOTHYROIDISM: ICD-10-CM

## 2025-08-28 DIAGNOSIS — F41.9 ANXIETY: ICD-10-CM

## 2025-08-28 DIAGNOSIS — F01.A0 MILD VASCULAR DEMENTIA WITHOUT BEHAVIORAL DISTURBANCE, PSYCHOTIC DISTURBANCE, MOOD DISTURBANCE, OR ANXIETY: Primary | ICD-10-CM

## 2025-08-28 DIAGNOSIS — I10 PRIMARY HYPERTENSION: ICD-10-CM

## 2025-08-29 VITALS
BODY MASS INDEX: 15.88 KG/M2 | DIASTOLIC BLOOD PRESSURE: 64 MMHG | RESPIRATION RATE: 16 BRPM | TEMPERATURE: 97.7 F | HEART RATE: 80 BPM | OXYGEN SATURATION: 95 % | WEIGHT: 80.9 LBS | HEIGHT: 60 IN | SYSTOLIC BLOOD PRESSURE: 128 MMHG

## 2025-08-29 RX ORDER — TRAZODONE HYDROCHLORIDE 50 MG/1
25 TABLET ORAL NIGHTLY
COMMUNITY
Start: 2025-04-09

## 2025-08-29 ASSESSMENT — ENCOUNTER SYMPTOMS
PALPITATIONS: 0
HEADACHES: 0
NAUSEA: 0
APPETITE CHANGE: 0
CHILLS: 0
SLEEP DISTURBANCE: 0
COUGH: 0
VOMITING: 0
LIGHT-HEADEDNESS: 0
ACTIVITY CHANGE: 0
DYSURIA: 0
ARTHRALGIAS: 1
NERVOUS/ANXIOUS: 0
WEAKNESS: 1
FATIGUE: 0
DIARRHEA: 0
SHORTNESS OF BREATH: 0
FREQUENCY: 0
FEVER: 0
DIZZINESS: 0
CONSTIPATION: 0
RHINORRHEA: 0

## 2025-09-02 ENCOUNTER — TELEPHONE (OUTPATIENT)
Dept: POST ACUTE CARE | Facility: EXTERNAL LOCATION | Age: OVER 89
End: 2025-09-02
Payer: MEDICARE